# Patient Record
Sex: MALE | Race: WHITE | Employment: OTHER | URBAN - METROPOLITAN AREA
[De-identification: names, ages, dates, MRNs, and addresses within clinical notes are randomized per-mention and may not be internally consistent; named-entity substitution may affect disease eponyms.]

---

## 2017-01-03 ENCOUNTER — HOSPITAL ENCOUNTER (OUTPATIENT)
Dept: WOUND CARE | Age: 77
Discharge: HOME | End: 2017-01-03
Attending: INTERNAL MEDICINE | Admitting: INTERNAL MEDICINE

## 2017-01-03 VITALS
HEART RATE: 56 BPM | DIASTOLIC BLOOD PRESSURE: 79 MMHG | SYSTOLIC BLOOD PRESSURE: 152 MMHG | RESPIRATION RATE: 20 BRPM | WEIGHT: 215.39 LBS | HEIGHT: 69 IN | BODY MASS INDEX: 31.9 KG/M2 | TEMPERATURE: 97.8 F

## 2017-01-03 RX ORDER — LIDOCAINE 50 MG/G
OINTMENT TOPICAL PRN
Status: DISCONTINUED | OUTPATIENT
Start: 2017-01-03 | End: 2017-01-04 | Stop reason: HOSPADM

## 2017-01-03 ASSESSMENT — PAIN DESCRIPTION - ORIENTATION: ORIENTATION: RIGHT

## 2017-01-03 ASSESSMENT — PAIN DESCRIPTION - PAIN TYPE: TYPE: CHRONIC PAIN

## 2017-01-03 ASSESSMENT — PAIN DESCRIPTION - PROGRESSION: CLINICAL_PROGRESSION: NOT CHANGED

## 2017-01-03 ASSESSMENT — PAIN DESCRIPTION - FREQUENCY: FREQUENCY: CONTINUOUS

## 2017-01-03 ASSESSMENT — PAIN DESCRIPTION - LOCATION: LOCATION: HAND

## 2017-01-03 ASSESSMENT — PAIN DESCRIPTION - DESCRIPTORS: DESCRIPTORS: ACHING;STABBING

## 2017-01-03 ASSESSMENT — PAIN DESCRIPTION - ONSET: ONSET: ON-GOING

## 2017-01-03 ASSESSMENT — PAIN SCALES - GENERAL: PAINLEVEL_OUTOF10: 2

## 2017-01-10 ENCOUNTER — HOSPITAL ENCOUNTER (OUTPATIENT)
Dept: WOUND CARE | Age: 77
Discharge: HOME | End: 2017-01-11
Attending: INTERNAL MEDICINE | Admitting: INTERNAL MEDICINE

## 2017-01-10 ENCOUNTER — TELEPHONE (OUTPATIENT)
Dept: INTERNAL MEDICINE CLINIC | Age: 77
End: 2017-01-10

## 2017-01-17 ENCOUNTER — HOSPITAL ENCOUNTER (OUTPATIENT)
Dept: WOUND CARE | Age: 77
Discharge: HOME | End: 2017-01-17
Attending: INTERNAL MEDICINE | Admitting: INTERNAL MEDICINE

## 2017-01-17 VITALS
TEMPERATURE: 98.4 F | SYSTOLIC BLOOD PRESSURE: 155 MMHG | HEART RATE: 57 BPM | RESPIRATION RATE: 20 BRPM | DIASTOLIC BLOOD PRESSURE: 81 MMHG

## 2017-01-17 RX ORDER — DOXYCYCLINE HYCLATE 100 MG
100 TABLET ORAL 2 TIMES DAILY
Qty: 28 TABLET | Refills: 0 | Status: SHIPPED | OUTPATIENT
Start: 2017-01-17 | End: 2017-01-31

## 2017-01-17 RX ORDER — LIDOCAINE 50 MG/G
OINTMENT TOPICAL ONCE
Status: DISCONTINUED | OUTPATIENT
Start: 2017-01-17 | End: 2017-01-18 | Stop reason: HOSPADM

## 2017-01-18 ENCOUNTER — TELEPHONE (OUTPATIENT)
Dept: INTERNAL MEDICINE CLINIC | Age: 77
End: 2017-01-18

## 2017-01-19 ENCOUNTER — INITIAL CONSULT (OUTPATIENT)
Dept: SURGERY | Age: 77
End: 2017-01-19

## 2017-01-19 ENCOUNTER — TELEPHONE (OUTPATIENT)
Dept: INTERNAL MEDICINE CLINIC | Age: 77
End: 2017-01-19

## 2017-01-19 ENCOUNTER — TELEPHONE (OUTPATIENT)
Dept: SURGERY | Age: 77
End: 2017-01-19

## 2017-01-19 VITALS
BODY MASS INDEX: 32.73 KG/M2 | DIASTOLIC BLOOD PRESSURE: 68 MMHG | WEIGHT: 221 LBS | HEIGHT: 69 IN | SYSTOLIC BLOOD PRESSURE: 120 MMHG

## 2017-01-19 DIAGNOSIS — Z72.0 TOBACCO ABUSE: ICD-10-CM

## 2017-01-19 DIAGNOSIS — C43.9 MELANOMA OF SKIN (HCC): Primary | ICD-10-CM

## 2017-01-19 PROCEDURE — G8419 CALC BMI OUT NRM PARAM NOF/U: HCPCS | Performed by: SURGERY

## 2017-01-19 PROCEDURE — 4004F PT TOBACCO SCREEN RCVD TLK: CPT | Performed by: SURGERY

## 2017-01-19 PROCEDURE — G8484 FLU IMMUNIZE NO ADMIN: HCPCS | Performed by: SURGERY

## 2017-01-19 PROCEDURE — 4040F PNEUMOC VAC/ADMIN/RCVD: CPT | Performed by: SURGERY

## 2017-01-19 PROCEDURE — G8427 DOCREV CUR MEDS BY ELIG CLIN: HCPCS | Performed by: SURGERY

## 2017-01-19 PROCEDURE — 99203 OFFICE O/P NEW LOW 30 MIN: CPT | Performed by: SURGERY

## 2017-01-19 PROCEDURE — G8598 ASA/ANTIPLAT THER USED: HCPCS | Performed by: SURGERY

## 2017-01-22 ASSESSMENT — ENCOUNTER SYMPTOMS
RESPIRATORY NEGATIVE: 1
COUGH: 0
COLOR CHANGE: 1
SHORTNESS OF BREATH: 0
GASTROINTESTINAL NEGATIVE: 1

## 2017-01-23 ENCOUNTER — HOSPITAL ENCOUNTER (OUTPATIENT)
Dept: SURGERY | Age: 77
Discharge: HOME | End: 2017-01-23
Attending: SURGERY | Admitting: SURGERY

## 2017-01-23 VITALS
SYSTOLIC BLOOD PRESSURE: 132 MMHG | TEMPERATURE: 97.5 F | BODY MASS INDEX: 32.46 KG/M2 | DIASTOLIC BLOOD PRESSURE: 54 MMHG | WEIGHT: 219.14 LBS | HEART RATE: 49 BPM | HEIGHT: 69 IN | OXYGEN SATURATION: 100 % | RESPIRATION RATE: 20 BRPM

## 2017-01-23 PROCEDURE — 11604 EXC TR-EXT MAL+MARG 3.1-4 CM: CPT | Performed by: SURGERY

## 2017-01-23 RX ORDER — HYDROCODONE BITARTRATE AND ACETAMINOPHEN 5; 325 MG/1; MG/1
1-2 TABLET ORAL EVERY 6 HOURS PRN
Qty: 20 TABLET | Refills: 0 | Status: SHIPPED | OUTPATIENT
Start: 2017-01-23 | End: 2017-03-03

## 2017-01-23 ASSESSMENT — PAIN SCALES - GENERAL: PAINLEVEL_OUTOF10: 0

## 2017-01-23 ASSESSMENT — PAIN - FUNCTIONAL ASSESSMENT: PAIN_FUNCTIONAL_ASSESSMENT: 0-10

## 2017-01-23 ASSESSMENT — PAIN DESCRIPTION - PAIN TYPE: TYPE: SURGICAL PAIN

## 2017-01-25 ENCOUNTER — OFFICE VISIT (OUTPATIENT)
Dept: SURGERY | Age: 77
End: 2017-01-25

## 2017-01-25 ENCOUNTER — TELEPHONE (OUTPATIENT)
Dept: SURGERY | Age: 77
End: 2017-01-25

## 2017-01-25 VITALS
SYSTOLIC BLOOD PRESSURE: 174 MMHG | BODY MASS INDEX: 32.14 KG/M2 | DIASTOLIC BLOOD PRESSURE: 68 MMHG | WEIGHT: 217 LBS | HEIGHT: 69 IN | HEART RATE: 45 BPM

## 2017-01-25 DIAGNOSIS — C43.9 MELANOMA OF SKIN (HCC): Primary | ICD-10-CM

## 2017-01-25 DIAGNOSIS — Z72.0 TOBACCO ABUSE: ICD-10-CM

## 2017-01-25 PROBLEM — K81.1 CHRONIC CHOLECYSTITIS: Status: ACTIVE | Noted: 2017-01-25

## 2017-01-25 PROBLEM — K81.1 CHRONIC CHOLECYSTITIS: Status: RESOLVED | Noted: 2017-01-25 | Resolved: 2017-01-25

## 2017-01-25 PROCEDURE — 99024 POSTOP FOLLOW-UP VISIT: CPT | Performed by: SURGERY

## 2017-02-09 ENCOUNTER — OFFICE VISIT (OUTPATIENT)
Dept: SURGERY | Age: 77
End: 2017-02-09

## 2017-02-09 VITALS
DIASTOLIC BLOOD PRESSURE: 80 MMHG | HEIGHT: 69 IN | WEIGHT: 217 LBS | SYSTOLIC BLOOD PRESSURE: 124 MMHG | BODY MASS INDEX: 32.14 KG/M2

## 2017-02-09 DIAGNOSIS — C43.9 MELANOMA OF SKIN (HCC): Primary | ICD-10-CM

## 2017-02-09 PROCEDURE — 99024 POSTOP FOLLOW-UP VISIT: CPT | Performed by: SURGERY

## 2017-02-15 RX ORDER — METOPROLOL SUCCINATE 50 MG/1
TABLET, EXTENDED RELEASE ORAL
Qty: 30 TABLET | Refills: 5 | Status: SHIPPED | OUTPATIENT
Start: 2017-02-15 | End: 2017-02-15 | Stop reason: SDUPTHER

## 2017-02-16 RX ORDER — METOPROLOL SUCCINATE 50 MG/1
TABLET, EXTENDED RELEASE ORAL
Qty: 30 TABLET | Refills: 5 | Status: SHIPPED | OUTPATIENT
Start: 2017-02-16 | End: 2017-02-16 | Stop reason: SDUPTHER

## 2017-02-16 RX ORDER — METOPROLOL SUCCINATE 50 MG/1
TABLET, EXTENDED RELEASE ORAL
Qty: 60 TABLET | Refills: 5 | Status: SHIPPED | OUTPATIENT
Start: 2017-02-16 | End: 2017-06-15 | Stop reason: SDUPTHER

## 2017-02-20 ENCOUNTER — OFFICE VISIT (OUTPATIENT)
Dept: INTERNAL MEDICINE CLINIC | Age: 77
End: 2017-02-20

## 2017-02-20 VITALS
HEART RATE: 46 BPM | SYSTOLIC BLOOD PRESSURE: 124 MMHG | BODY MASS INDEX: 31.99 KG/M2 | TEMPERATURE: 98.1 F | HEIGHT: 69 IN | OXYGEN SATURATION: 98 % | WEIGHT: 216 LBS | RESPIRATION RATE: 16 BRPM | DIASTOLIC BLOOD PRESSURE: 62 MMHG

## 2017-02-20 DIAGNOSIS — J40 BRONCHITIS: Primary | ICD-10-CM

## 2017-02-20 DIAGNOSIS — I48.0 PAROXYSMAL ATRIAL FIBRILLATION (HCC): ICD-10-CM

## 2017-02-20 DIAGNOSIS — I10 ESSENTIAL HYPERTENSION: Chronic | ICD-10-CM

## 2017-02-20 PROCEDURE — 4040F PNEUMOC VAC/ADMIN/RCVD: CPT | Performed by: INTERNAL MEDICINE

## 2017-02-20 PROCEDURE — G8484 FLU IMMUNIZE NO ADMIN: HCPCS | Performed by: INTERNAL MEDICINE

## 2017-02-20 PROCEDURE — 1123F ACP DISCUSS/DSCN MKR DOCD: CPT | Performed by: INTERNAL MEDICINE

## 2017-02-20 PROCEDURE — G8428 CUR MEDS NOT DOCUMENT: HCPCS | Performed by: INTERNAL MEDICINE

## 2017-02-20 PROCEDURE — G8598 ASA/ANTIPLAT THER USED: HCPCS | Performed by: INTERNAL MEDICINE

## 2017-02-20 PROCEDURE — 99214 OFFICE O/P EST MOD 30 MIN: CPT | Performed by: INTERNAL MEDICINE

## 2017-02-20 PROCEDURE — 4004F PT TOBACCO SCREEN RCVD TLK: CPT | Performed by: INTERNAL MEDICINE

## 2017-02-20 PROCEDURE — G8419 CALC BMI OUT NRM PARAM NOF/U: HCPCS | Performed by: INTERNAL MEDICINE

## 2017-02-20 RX ORDER — CEFUROXIME AXETIL 500 MG/1
500 TABLET ORAL 2 TIMES DAILY
Qty: 20 TABLET | Refills: 0 | Status: SHIPPED | OUTPATIENT
Start: 2017-02-20 | End: 2017-03-02

## 2017-02-20 RX ORDER — PREDNISONE 10 MG/1
TABLET ORAL
Qty: 28 TABLET | Refills: 0 | Status: SHIPPED | OUTPATIENT
Start: 2017-02-20 | End: 2017-03-03

## 2017-02-20 RX ORDER — PROMETHAZINE HYDROCHLORIDE AND CODEINE PHOSPHATE 6.25; 1 MG/5ML; MG/5ML
5 SYRUP ORAL EVERY 4 HOURS PRN
Qty: 180 ML | Refills: 0 | Status: SHIPPED | OUTPATIENT
Start: 2017-02-20 | End: 2017-02-27

## 2017-02-20 ASSESSMENT — ENCOUNTER SYMPTOMS
ABDOMINAL PAIN: 0
SHORTNESS OF BREATH: 0

## 2017-02-21 ENCOUNTER — HOSPITAL ENCOUNTER (OUTPATIENT)
Dept: OTHER | Age: 77
Discharge: HOME | End: 2017-02-21
Attending: INTERNAL MEDICINE | Admitting: INTERNAL MEDICINE

## 2017-02-21 DIAGNOSIS — J40 BRONCHITIS: ICD-10-CM

## 2017-02-22 ASSESSMENT — ENCOUNTER SYMPTOMS
ABDOMINAL PAIN: 0
SHORTNESS OF BREATH: 0

## 2017-02-28 ENCOUNTER — OFFICE VISIT (OUTPATIENT)
Dept: INTERNAL MEDICINE CLINIC | Age: 77
End: 2017-02-28

## 2017-02-28 VITALS
OXYGEN SATURATION: 94 % | HEART RATE: 77 BPM | RESPIRATION RATE: 16 BRPM | BODY MASS INDEX: 31.7 KG/M2 | WEIGHT: 214 LBS | SYSTOLIC BLOOD PRESSURE: 130 MMHG | TEMPERATURE: 97.9 F | HEIGHT: 69 IN | DIASTOLIC BLOOD PRESSURE: 62 MMHG

## 2017-02-28 DIAGNOSIS — I25.709 ATHEROSCLEROSIS OF CORONARY ARTERY BYPASS GRAFT OF NATIVE HEART WITH ANGINA PECTORIS (HCC): ICD-10-CM

## 2017-02-28 DIAGNOSIS — G40.909 UNSPECIFIED EPILEPSY WITHOUT MENTION OF INTRACTABLE EPILEPSY: ICD-10-CM

## 2017-02-28 DIAGNOSIS — R53.83 OTHER FATIGUE: ICD-10-CM

## 2017-02-28 DIAGNOSIS — N40.0 BENIGN NON-NODULAR PROSTATIC HYPERPLASIA WITHOUT LOWER URINARY TRACT SYMPTOMS: ICD-10-CM

## 2017-02-28 DIAGNOSIS — I10 ESSENTIAL HYPERTENSION: Chronic | ICD-10-CM

## 2017-02-28 DIAGNOSIS — E78.00 PURE HYPERCHOLESTEROLEMIA: Chronic | ICD-10-CM

## 2017-02-28 DIAGNOSIS — J40 BRONCHITIS: Primary | ICD-10-CM

## 2017-02-28 DIAGNOSIS — I50.31 ACUTE DIASTOLIC HEART FAILURE (HCC): ICD-10-CM

## 2017-02-28 PROCEDURE — 1123F ACP DISCUSS/DSCN MKR DOCD: CPT | Performed by: INTERNAL MEDICINE

## 2017-02-28 PROCEDURE — 4004F PT TOBACCO SCREEN RCVD TLK: CPT | Performed by: INTERNAL MEDICINE

## 2017-02-28 PROCEDURE — G8428 CUR MEDS NOT DOCUMENT: HCPCS | Performed by: INTERNAL MEDICINE

## 2017-02-28 PROCEDURE — G8417 CALC BMI ABV UP PARAM F/U: HCPCS | Performed by: INTERNAL MEDICINE

## 2017-02-28 PROCEDURE — 99214 OFFICE O/P EST MOD 30 MIN: CPT | Performed by: INTERNAL MEDICINE

## 2017-02-28 PROCEDURE — G8598 ASA/ANTIPLAT THER USED: HCPCS | Performed by: INTERNAL MEDICINE

## 2017-02-28 PROCEDURE — 4040F PNEUMOC VAC/ADMIN/RCVD: CPT | Performed by: INTERNAL MEDICINE

## 2017-02-28 PROCEDURE — G8484 FLU IMMUNIZE NO ADMIN: HCPCS | Performed by: INTERNAL MEDICINE

## 2017-03-03 ENCOUNTER — OFFICE VISIT (OUTPATIENT)
Dept: NEUROLOGY | Age: 77
End: 2017-03-03

## 2017-03-03 ENCOUNTER — TELEPHONE (OUTPATIENT)
Dept: INTERNAL MEDICINE CLINIC | Age: 77
End: 2017-03-03

## 2017-03-03 VITALS
SYSTOLIC BLOOD PRESSURE: 124 MMHG | WEIGHT: 215 LBS | BODY MASS INDEX: 30.78 KG/M2 | DIASTOLIC BLOOD PRESSURE: 62 MMHG | HEIGHT: 70 IN | HEART RATE: 60 BPM

## 2017-03-03 DIAGNOSIS — G40.209 PARTIAL SYMPTOMATIC EPILEPSY WITH COMPLEX PARTIAL SEIZURES, NOT INTRACTABLE, WITHOUT STATUS EPILEPTICUS (HCC): ICD-10-CM

## 2017-03-03 DIAGNOSIS — R41.82 ALTERED MENTAL STATUS, UNSPECIFIED ALTERED MENTAL STATUS TYPE: Primary | ICD-10-CM

## 2017-03-03 DIAGNOSIS — G44.86 CERVICOGENIC HEADACHE: ICD-10-CM

## 2017-03-03 DIAGNOSIS — M47.812 SPONDYLOSIS OF CERVICAL REGION WITHOUT MYELOPATHY OR RADICULOPATHY: ICD-10-CM

## 2017-03-03 PROCEDURE — G8484 FLU IMMUNIZE NO ADMIN: HCPCS | Performed by: PSYCHIATRY & NEUROLOGY

## 2017-03-03 PROCEDURE — G8427 DOCREV CUR MEDS BY ELIG CLIN: HCPCS | Performed by: PSYCHIATRY & NEUROLOGY

## 2017-03-03 PROCEDURE — 1123F ACP DISCUSS/DSCN MKR DOCD: CPT | Performed by: PSYCHIATRY & NEUROLOGY

## 2017-03-03 PROCEDURE — 4040F PNEUMOC VAC/ADMIN/RCVD: CPT | Performed by: PSYCHIATRY & NEUROLOGY

## 2017-03-03 PROCEDURE — G8417 CALC BMI ABV UP PARAM F/U: HCPCS | Performed by: PSYCHIATRY & NEUROLOGY

## 2017-03-03 PROCEDURE — 4004F PT TOBACCO SCREEN RCVD TLK: CPT | Performed by: PSYCHIATRY & NEUROLOGY

## 2017-03-03 PROCEDURE — G8598 ASA/ANTIPLAT THER USED: HCPCS | Performed by: PSYCHIATRY & NEUROLOGY

## 2017-03-03 PROCEDURE — 99214 OFFICE O/P EST MOD 30 MIN: CPT | Performed by: PSYCHIATRY & NEUROLOGY

## 2017-03-03 RX ORDER — FLUTICASONE PROPIONATE 50 MCG
2 SPRAY, SUSPENSION (ML) NASAL 2 TIMES DAILY
Qty: 1 BOTTLE | Refills: 3 | Status: SHIPPED | OUTPATIENT
Start: 2017-03-03 | End: 2017-04-18 | Stop reason: ALTCHOICE

## 2017-03-03 RX ORDER — LORATADINE 10 MG/1
10 TABLET ORAL DAILY
Qty: 30 TABLET | Refills: 3 | Status: SHIPPED | OUTPATIENT
Start: 2017-03-03

## 2017-03-03 RX ORDER — LEVETIRACETAM 500 MG/1
TABLET ORAL
Qty: 90 TABLET | Refills: 1 | Status: SHIPPED | OUTPATIENT
Start: 2017-03-03 | End: 2017-04-13 | Stop reason: SDUPTHER

## 2017-03-10 DIAGNOSIS — R53.81 MALAISE AND FATIGUE: ICD-10-CM

## 2017-03-10 DIAGNOSIS — E78.00 PURE HYPERCHOLESTEROLEMIA: Primary | ICD-10-CM

## 2017-03-10 DIAGNOSIS — R53.83 MALAISE AND FATIGUE: ICD-10-CM

## 2017-03-10 DIAGNOSIS — N40.0 BENIGN NON-NODULAR PROSTATIC HYPERPLASIA WITHOUT LOWER URINARY TRACT SYMPTOMS: ICD-10-CM

## 2017-03-10 LAB
A/G RATIO: 1.7 (ref 1.1–2.2)
ALBUMIN SERPL-MCNC: 4 G/DL (ref 3.4–5)
ALP BLD-CCNC: 82 U/L (ref 40–129)
ALT SERPL-CCNC: 20 U/L (ref 10–40)
ANION GAP SERPL CALCULATED.3IONS-SCNC: 15 MMOL/L (ref 3–16)
AST SERPL-CCNC: 21 U/L (ref 15–37)
BASOPHILS ABSOLUTE: 0 K/UL (ref 0–0.2)
BASOPHILS RELATIVE PERCENT: 0.4 %
BILIRUB SERPL-MCNC: 0.3 MG/DL (ref 0–1)
BUN BLDV-MCNC: 9 MG/DL (ref 7–20)
CALCIUM SERPL-MCNC: 8.9 MG/DL (ref 8.3–10.6)
CHLORIDE BLD-SCNC: 88 MMOL/L (ref 99–110)
CHOLESTEROL, TOTAL: 196 MG/DL (ref 0–199)
CO2: 24 MMOL/L (ref 21–32)
CREAT SERPL-MCNC: 0.8 MG/DL (ref 0.8–1.3)
EOSINOPHILS ABSOLUTE: 0 K/UL (ref 0–0.6)
EOSINOPHILS RELATIVE PERCENT: 1 %
GFR AFRICAN AMERICAN: >60
GFR NON-AFRICAN AMERICAN: >60
GLOBULIN: 2.3 G/DL
GLUCOSE BLD-MCNC: 93 MG/DL (ref 70–99)
HCT VFR BLD CALC: 41.3 % (ref 40.5–52.5)
HDLC SERPL-MCNC: 41 MG/DL (ref 40–60)
HEMOGLOBIN: 13.5 G/DL (ref 13.5–17.5)
LDL CHOLESTEROL CALCULATED: 113 MG/DL
LYMPHOCYTES ABSOLUTE: 1 K/UL (ref 1–5.1)
LYMPHOCYTES RELATIVE PERCENT: 20.5 %
MCH RBC QN AUTO: 28.8 PG (ref 26–34)
MCHC RBC AUTO-ENTMCNC: 32.6 G/DL (ref 31–36)
MCV RBC AUTO: 88.2 FL (ref 80–100)
MONOCYTES ABSOLUTE: 0.5 K/UL (ref 0–1.3)
MONOCYTES RELATIVE PERCENT: 10 %
NEUTROPHILS ABSOLUTE: 3.5 K/UL (ref 1.7–7.7)
NEUTROPHILS RELATIVE PERCENT: 68.1 %
PDW BLD-RTO: 14.5 % (ref 12.4–15.4)
PLATELET # BLD: 208 K/UL (ref 135–450)
PMV BLD AUTO: 7.6 FL (ref 5–10.5)
POTASSIUM SERPL-SCNC: 4.9 MMOL/L (ref 3.5–5.1)
PROSTATE SPECIFIC ANTIGEN: 1.47 NG/ML (ref 0–4)
RBC # BLD: 4.68 M/UL (ref 4.2–5.9)
SODIUM BLD-SCNC: 127 MMOL/L (ref 136–145)
TOTAL PROTEIN: 6.3 G/DL (ref 6.4–8.2)
TRIGL SERPL-MCNC: 208 MG/DL (ref 0–150)
TSH SERPL DL<=0.05 MIU/L-ACNC: 2.65 UIU/ML (ref 0.27–4.2)
VLDLC SERPL CALC-MCNC: 42 MG/DL
WBC # BLD: 5.1 K/UL (ref 4–11)

## 2017-03-13 RX ORDER — TORSEMIDE 20 MG/1
20 TABLET ORAL DAILY
Qty: 30 TABLET | Refills: 1 | Status: SHIPPED | OUTPATIENT
Start: 2017-03-13 | End: 2017-03-29 | Stop reason: SDUPTHER

## 2017-03-23 RX ORDER — ESCITALOPRAM OXALATE 10 MG/1
TABLET ORAL
Qty: 30 TABLET | Refills: 5 | Status: SHIPPED | OUTPATIENT
Start: 2017-03-23 | End: 2017-10-18 | Stop reason: SDUPTHER

## 2017-03-24 ASSESSMENT — ENCOUNTER SYMPTOMS
ABDOMINAL PAIN: 0
SHORTNESS OF BREATH: 0

## 2017-03-28 DIAGNOSIS — E87.1 LOW SODIUM LEVELS: Primary | ICD-10-CM

## 2017-03-28 LAB
ANION GAP SERPL CALCULATED.3IONS-SCNC: 15 MMOL/L (ref 3–16)
BUN BLDV-MCNC: 14 MG/DL (ref 7–20)
CALCIUM SERPL-MCNC: 9.2 MG/DL (ref 8.3–10.6)
CHLORIDE BLD-SCNC: 94 MMOL/L (ref 99–110)
CO2: 28 MMOL/L (ref 21–32)
CREAT SERPL-MCNC: 0.9 MG/DL (ref 0.8–1.3)
GFR AFRICAN AMERICAN: >60
GFR NON-AFRICAN AMERICAN: >60
GLUCOSE BLD-MCNC: 106 MG/DL (ref 70–99)
POTASSIUM SERPL-SCNC: 4.6 MMOL/L (ref 3.5–5.1)
SODIUM BLD-SCNC: 137 MMOL/L (ref 136–145)

## 2017-03-29 ENCOUNTER — OFFICE VISIT (OUTPATIENT)
Dept: INTERNAL MEDICINE CLINIC | Age: 77
End: 2017-03-29

## 2017-03-29 VITALS
RESPIRATION RATE: 16 BRPM | WEIGHT: 214 LBS | SYSTOLIC BLOOD PRESSURE: 132 MMHG | HEART RATE: 72 BPM | DIASTOLIC BLOOD PRESSURE: 60 MMHG | BODY MASS INDEX: 31.7 KG/M2 | HEIGHT: 69 IN

## 2017-03-29 DIAGNOSIS — I10 ESSENTIAL HYPERTENSION: Chronic | ICD-10-CM

## 2017-03-29 DIAGNOSIS — E87.1 HYPONATREMIA: Primary | ICD-10-CM

## 2017-03-29 DIAGNOSIS — E78.00 PURE HYPERCHOLESTEROLEMIA: Chronic | ICD-10-CM

## 2017-03-29 PROCEDURE — 4004F PT TOBACCO SCREEN RCVD TLK: CPT | Performed by: INTERNAL MEDICINE

## 2017-03-29 PROCEDURE — 1123F ACP DISCUSS/DSCN MKR DOCD: CPT | Performed by: INTERNAL MEDICINE

## 2017-03-29 PROCEDURE — G8484 FLU IMMUNIZE NO ADMIN: HCPCS | Performed by: INTERNAL MEDICINE

## 2017-03-29 PROCEDURE — G8417 CALC BMI ABV UP PARAM F/U: HCPCS | Performed by: INTERNAL MEDICINE

## 2017-03-29 PROCEDURE — 99214 OFFICE O/P EST MOD 30 MIN: CPT | Performed by: INTERNAL MEDICINE

## 2017-03-29 PROCEDURE — G8428 CUR MEDS NOT DOCUMENT: HCPCS | Performed by: INTERNAL MEDICINE

## 2017-03-29 PROCEDURE — G8598 ASA/ANTIPLAT THER USED: HCPCS | Performed by: INTERNAL MEDICINE

## 2017-03-29 PROCEDURE — 4040F PNEUMOC VAC/ADMIN/RCVD: CPT | Performed by: INTERNAL MEDICINE

## 2017-03-29 RX ORDER — TORSEMIDE 20 MG/1
20 TABLET ORAL DAILY
Qty: 90 TABLET | Refills: 3 | Status: SHIPPED | OUTPATIENT
Start: 2017-03-29 | End: 2017-04-27

## 2017-04-11 ENCOUNTER — HOSPITAL ENCOUNTER (OUTPATIENT)
Dept: WOUND CARE | Age: 77
Discharge: HOME | End: 2017-04-11
Attending: INTERNAL MEDICINE | Admitting: INTERNAL MEDICINE

## 2017-04-11 ENCOUNTER — HOSPITAL ENCOUNTER (OUTPATIENT)
Dept: OTHER | Age: 77
Discharge: HOME | End: 2017-04-11
Attending: INTERNAL MEDICINE | Admitting: INTERNAL MEDICINE

## 2017-04-11 VITALS
HEART RATE: 57 BPM | WEIGHT: 215.17 LBS | TEMPERATURE: 98.1 F | BODY MASS INDEX: 31.77 KG/M2 | RESPIRATION RATE: 17 BRPM | DIASTOLIC BLOOD PRESSURE: 81 MMHG | SYSTOLIC BLOOD PRESSURE: 136 MMHG

## 2017-04-11 DIAGNOSIS — S59.919A INJURY, OTHER AND UNSPECIFIED, ELBOW, FOREARM, AND WRIST: ICD-10-CM

## 2017-04-11 DIAGNOSIS — S59.909A INJURY, OTHER AND UNSPECIFIED, ELBOW, FOREARM, AND WRIST: ICD-10-CM

## 2017-04-11 DIAGNOSIS — S69.90XA INJURY, OTHER AND UNSPECIFIED, ELBOW, FOREARM, AND WRIST: ICD-10-CM

## 2017-04-11 DIAGNOSIS — S69.90XS: Primary | ICD-10-CM

## 2017-04-11 LAB
C-REACTIVE PROTEIN: 3.9 MG/L (ref 0–5.1)
SEDIMENTATION RATE, ERYTHROCYTE: 14 MM/HR (ref 0–20)

## 2017-04-13 ENCOUNTER — OFFICE VISIT (OUTPATIENT)
Age: 77
End: 2017-04-13

## 2017-04-13 VITALS
HEIGHT: 70 IN | BODY MASS INDEX: 30.92 KG/M2 | OXYGEN SATURATION: 98 % | SYSTOLIC BLOOD PRESSURE: 122 MMHG | WEIGHT: 216 LBS | DIASTOLIC BLOOD PRESSURE: 64 MMHG | HEART RATE: 56 BPM

## 2017-04-13 DIAGNOSIS — I50.33 ACUTE ON CHRONIC DIASTOLIC CHF (CONGESTIVE HEART FAILURE), NYHA CLASS 3 (HCC): Primary | ICD-10-CM

## 2017-04-13 DIAGNOSIS — Z95.1 HX OF CABG: ICD-10-CM

## 2017-04-13 DIAGNOSIS — R06.09 DOE (DYSPNEA ON EXERTION): ICD-10-CM

## 2017-04-13 DIAGNOSIS — I10 ESSENTIAL HYPERTENSION: Chronic | ICD-10-CM

## 2017-04-13 DIAGNOSIS — I25.10 CORONARY ARTERY DISEASE INVOLVING NATIVE CORONARY ARTERY OF NATIVE HEART WITHOUT ANGINA PECTORIS: Chronic | ICD-10-CM

## 2017-04-13 DIAGNOSIS — R63.5 WEIGHT GAIN: ICD-10-CM

## 2017-04-13 DIAGNOSIS — R06.00 PND (PAROXYSMAL NOCTURNAL DYSPNEA): ICD-10-CM

## 2017-04-13 DIAGNOSIS — I48.91 ATRIAL FIBRILLATION, UNSPECIFIED TYPE (HCC): ICD-10-CM

## 2017-04-13 PROCEDURE — G8417 CALC BMI ABV UP PARAM F/U: HCPCS | Performed by: INTERNAL MEDICINE

## 2017-04-13 PROCEDURE — 4040F PNEUMOC VAC/ADMIN/RCVD: CPT | Performed by: INTERNAL MEDICINE

## 2017-04-13 PROCEDURE — 99214 OFFICE O/P EST MOD 30 MIN: CPT | Performed by: INTERNAL MEDICINE

## 2017-04-13 PROCEDURE — 93000 ELECTROCARDIOGRAM COMPLETE: CPT | Performed by: INTERNAL MEDICINE

## 2017-04-13 PROCEDURE — G8427 DOCREV CUR MEDS BY ELIG CLIN: HCPCS | Performed by: INTERNAL MEDICINE

## 2017-04-13 PROCEDURE — 1123F ACP DISCUSS/DSCN MKR DOCD: CPT | Performed by: INTERNAL MEDICINE

## 2017-04-13 PROCEDURE — G8598 ASA/ANTIPLAT THER USED: HCPCS | Performed by: INTERNAL MEDICINE

## 2017-04-13 PROCEDURE — 4004F PT TOBACCO SCREEN RCVD TLK: CPT | Performed by: INTERNAL MEDICINE

## 2017-04-13 RX ORDER — AMIODARONE HYDROCHLORIDE 200 MG/1
100 TABLET ORAL DAILY
Qty: 30 TABLET | Refills: 6 | Status: SHIPPED | OUTPATIENT
Start: 2017-04-13 | End: 2017-10-05 | Stop reason: ALTCHOICE

## 2017-04-17 ENCOUNTER — TELEPHONE (OUTPATIENT)
Dept: INTERNAL MEDICINE CLINIC | Age: 77
End: 2017-04-17

## 2017-04-18 ENCOUNTER — HOSPITAL ENCOUNTER (OUTPATIENT)
Dept: WOUND CARE | Age: 77
Discharge: HOME | End: 2017-04-18
Attending: INTERNAL MEDICINE | Admitting: INTERNAL MEDICINE

## 2017-04-18 VITALS
RESPIRATION RATE: 18 BRPM | HEART RATE: 52 BPM | TEMPERATURE: 98 F | DIASTOLIC BLOOD PRESSURE: 66 MMHG | SYSTOLIC BLOOD PRESSURE: 123 MMHG

## 2017-04-18 ASSESSMENT — PAIN SCALES - GENERAL: PAINLEVEL_OUTOF10: 0

## 2017-04-27 RX ORDER — TORSEMIDE 20 MG/1
TABLET ORAL
Qty: 30 TABLET | Refills: 5 | Status: SHIPPED | OUTPATIENT
Start: 2017-04-27 | End: 2017-08-10 | Stop reason: SDUPTHER

## 2017-05-01 ENCOUNTER — TELEPHONE (OUTPATIENT)
Dept: CARDIOLOGY CLINIC | Age: 77
End: 2017-05-01

## 2017-05-04 DIAGNOSIS — I25.10 CORONARY ARTERY DISEASE INVOLVING NATIVE CORONARY ARTERY OF NATIVE HEART WITHOUT ANGINA PECTORIS: Chronic | ICD-10-CM

## 2017-05-04 DIAGNOSIS — R06.00 PND (PAROXYSMAL NOCTURNAL DYSPNEA): ICD-10-CM

## 2017-05-04 DIAGNOSIS — I10 ESSENTIAL HYPERTENSION: Chronic | ICD-10-CM

## 2017-05-04 DIAGNOSIS — R63.5 WEIGHT GAIN: ICD-10-CM

## 2017-05-04 DIAGNOSIS — R06.09 DOE (DYSPNEA ON EXERTION): ICD-10-CM

## 2017-05-04 LAB
ANION GAP SERPL CALCULATED.3IONS-SCNC: 18 MMOL/L (ref 3–16)
BUN BLDV-MCNC: 20 MG/DL (ref 7–20)
CALCIUM SERPL-MCNC: 9.4 MG/DL (ref 8.3–10.6)
CHLORIDE BLD-SCNC: 92 MMOL/L (ref 99–110)
CO2: 26 MMOL/L (ref 21–32)
CREAT SERPL-MCNC: 1.2 MG/DL (ref 0.8–1.3)
GFR AFRICAN AMERICAN: >60
GFR NON-AFRICAN AMERICAN: 58
GLUCOSE BLD-MCNC: 85 MG/DL (ref 70–99)
POTASSIUM SERPL-SCNC: 5.1 MMOL/L (ref 3.5–5.1)
PRO-BNP: 289 PG/ML (ref 0–449)
SODIUM BLD-SCNC: 136 MMOL/L (ref 136–145)

## 2017-06-15 ENCOUNTER — OFFICE VISIT (OUTPATIENT)
Age: 77
End: 2017-06-15

## 2017-06-15 VITALS
BODY MASS INDEX: 31.12 KG/M2 | WEIGHT: 217.4 LBS | OXYGEN SATURATION: 96 % | SYSTOLIC BLOOD PRESSURE: 120 MMHG | HEART RATE: 45 BPM | HEIGHT: 70 IN | DIASTOLIC BLOOD PRESSURE: 50 MMHG

## 2017-06-15 DIAGNOSIS — Z79.899 ON AMIODARONE THERAPY: ICD-10-CM

## 2017-06-15 DIAGNOSIS — I50.32 CHRONIC DIASTOLIC CHF (CONGESTIVE HEART FAILURE), NYHA CLASS 2 (HCC): ICD-10-CM

## 2017-06-15 DIAGNOSIS — M79.605 LEG PAIN, BILATERAL: ICD-10-CM

## 2017-06-15 DIAGNOSIS — I48.91 ATRIAL FIBRILLATION, UNSPECIFIED TYPE (HCC): ICD-10-CM

## 2017-06-15 DIAGNOSIS — M79.604 LEG PAIN, BILATERAL: ICD-10-CM

## 2017-06-15 DIAGNOSIS — I25.10 CORONARY ARTERY DISEASE INVOLVING NATIVE CORONARY ARTERY OF NATIVE HEART WITHOUT ANGINA PECTORIS: Primary | Chronic | ICD-10-CM

## 2017-06-15 DIAGNOSIS — R63.5 WEIGHT GAIN: ICD-10-CM

## 2017-06-15 DIAGNOSIS — I10 ESSENTIAL HYPERTENSION: Chronic | ICD-10-CM

## 2017-06-15 DIAGNOSIS — I73.9 CLAUDICATION (HCC): ICD-10-CM

## 2017-06-15 DIAGNOSIS — E78.2 MIXED HYPERLIPIDEMIA: Chronic | ICD-10-CM

## 2017-06-15 DIAGNOSIS — I48.0 PAROXYSMAL A-FIB (HCC): ICD-10-CM

## 2017-06-15 DIAGNOSIS — R06.09 DYSPNEA ON EXERTION: ICD-10-CM

## 2017-06-15 PROCEDURE — G8417 CALC BMI ABV UP PARAM F/U: HCPCS | Performed by: INTERNAL MEDICINE

## 2017-06-15 PROCEDURE — G8598 ASA/ANTIPLAT THER USED: HCPCS | Performed by: INTERNAL MEDICINE

## 2017-06-15 PROCEDURE — G8427 DOCREV CUR MEDS BY ELIG CLIN: HCPCS | Performed by: INTERNAL MEDICINE

## 2017-06-15 PROCEDURE — 1123F ACP DISCUSS/DSCN MKR DOCD: CPT | Performed by: INTERNAL MEDICINE

## 2017-06-15 PROCEDURE — 99214 OFFICE O/P EST MOD 30 MIN: CPT | Performed by: INTERNAL MEDICINE

## 2017-06-15 PROCEDURE — 4004F PT TOBACCO SCREEN RCVD TLK: CPT | Performed by: INTERNAL MEDICINE

## 2017-06-15 PROCEDURE — 4040F PNEUMOC VAC/ADMIN/RCVD: CPT | Performed by: INTERNAL MEDICINE

## 2017-06-15 PROCEDURE — 93000 ELECTROCARDIOGRAM COMPLETE: CPT | Performed by: INTERNAL MEDICINE

## 2017-06-15 RX ORDER — LISINOPRIL 5 MG/1
5 TABLET ORAL DAILY
Qty: 90 TABLET | Refills: 3 | Status: SHIPPED | OUTPATIENT
Start: 2017-06-15 | End: 2018-04-03 | Stop reason: SDUPTHER

## 2017-06-15 RX ORDER — METOPROLOL SUCCINATE 50 MG/1
50 TABLET, EXTENDED RELEASE ORAL DAILY
Qty: 30 TABLET | Refills: 6 | Status: SHIPPED | OUTPATIENT
Start: 2017-06-15 | End: 2017-06-15 | Stop reason: SDUPTHER

## 2017-06-15 RX ORDER — METOPROLOL SUCCINATE 50 MG/1
50 TABLET, EXTENDED RELEASE ORAL NIGHTLY
Qty: 30 TABLET | Refills: 6
Start: 2017-06-15 | End: 2017-10-05 | Stop reason: SDUPTHER

## 2017-06-23 DIAGNOSIS — I48.91 ATRIAL FIBRILLATION STATUS POST CARDIOVERSION (HCC): ICD-10-CM

## 2017-06-23 DIAGNOSIS — R06.09 DYSPNEA ON EXERTION: ICD-10-CM

## 2017-06-23 DIAGNOSIS — E78.00 PURE HYPERCHOLESTEROLEMIA: Chronic | ICD-10-CM

## 2017-06-23 DIAGNOSIS — I10 ESSENTIAL HYPERTENSION: Chronic | ICD-10-CM

## 2017-06-23 LAB
ANION GAP SERPL CALCULATED.3IONS-SCNC: 15 MMOL/L (ref 3–16)
BUN BLDV-MCNC: 20 MG/DL (ref 7–20)
CALCIUM SERPL-MCNC: 9.2 MG/DL (ref 8.3–10.6)
CHLORIDE BLD-SCNC: 92 MMOL/L (ref 99–110)
CO2: 27 MMOL/L (ref 21–32)
CREAT SERPL-MCNC: 0.9 MG/DL (ref 0.8–1.3)
GFR AFRICAN AMERICAN: >60
GFR NON-AFRICAN AMERICAN: >60
GLUCOSE BLD-MCNC: 93 MG/DL (ref 70–99)
POTASSIUM SERPL-SCNC: 4.7 MMOL/L (ref 3.5–5.1)
PRO-BNP: 332 PG/ML (ref 0–449)
SODIUM BLD-SCNC: 134 MMOL/L (ref 136–145)
TSH REFLEX: 3.69 UIU/ML (ref 0.27–4.2)

## 2017-06-28 ENCOUNTER — HOSPITAL ENCOUNTER (OUTPATIENT)
Dept: VASCULAR LAB | Age: 77
Discharge: HOME | End: 2017-06-28
Attending: INTERNAL MEDICINE | Admitting: INTERNAL MEDICINE

## 2017-06-28 DIAGNOSIS — M79.604 PAIN OF RIGHT LEG: ICD-10-CM

## 2017-07-17 ENCOUNTER — OFFICE VISIT (OUTPATIENT)
Dept: INTERNAL MEDICINE CLINIC | Age: 77
End: 2017-07-17

## 2017-07-17 VITALS
SYSTOLIC BLOOD PRESSURE: 120 MMHG | WEIGHT: 213 LBS | HEART RATE: 80 BPM | HEIGHT: 69 IN | BODY MASS INDEX: 31.55 KG/M2 | RESPIRATION RATE: 16 BRPM | DIASTOLIC BLOOD PRESSURE: 50 MMHG

## 2017-07-17 DIAGNOSIS — I48.92 ATRIAL FLUTTER, UNSPECIFIED TYPE (HCC): ICD-10-CM

## 2017-07-17 DIAGNOSIS — M70.21 OLECRANON BURSITIS OF RIGHT ELBOW: ICD-10-CM

## 2017-07-17 DIAGNOSIS — E74.39 GLUCOSE INTOLERANCE (MALABSORPTION): Primary | ICD-10-CM

## 2017-07-17 PROBLEM — M70.22 OLECRANON BURSITIS OF LEFT ELBOW: Status: ACTIVE | Noted: 2017-07-17

## 2017-07-17 PROCEDURE — 1123F ACP DISCUSS/DSCN MKR DOCD: CPT | Performed by: INTERNAL MEDICINE

## 2017-07-17 PROCEDURE — G8417 CALC BMI ABV UP PARAM F/U: HCPCS | Performed by: INTERNAL MEDICINE

## 2017-07-17 PROCEDURE — 4004F PT TOBACCO SCREEN RCVD TLK: CPT | Performed by: INTERNAL MEDICINE

## 2017-07-17 PROCEDURE — 4040F PNEUMOC VAC/ADMIN/RCVD: CPT | Performed by: INTERNAL MEDICINE

## 2017-07-17 PROCEDURE — 20605 DRAIN/INJ JOINT/BURSA W/O US: CPT | Performed by: INTERNAL MEDICINE

## 2017-07-17 PROCEDURE — 99214 OFFICE O/P EST MOD 30 MIN: CPT | Performed by: INTERNAL MEDICINE

## 2017-07-17 PROCEDURE — G8598 ASA/ANTIPLAT THER USED: HCPCS | Performed by: INTERNAL MEDICINE

## 2017-07-17 PROCEDURE — G8428 CUR MEDS NOT DOCUMENT: HCPCS | Performed by: INTERNAL MEDICINE

## 2017-07-17 ASSESSMENT — ENCOUNTER SYMPTOMS
ABDOMINAL PAIN: 0
SHORTNESS OF BREATH: 0

## 2017-07-26 ENCOUNTER — TELEPHONE (OUTPATIENT)
Dept: INTERNAL MEDICINE CLINIC | Age: 77
End: 2017-07-26

## 2017-07-27 PROBLEM — R21 RASH: Status: ACTIVE | Noted: 2017-07-27

## 2017-07-27 ASSESSMENT — ENCOUNTER SYMPTOMS
ABDOMINAL PAIN: 0
SHORTNESS OF BREATH: 0

## 2017-07-28 ENCOUNTER — OFFICE VISIT (OUTPATIENT)
Dept: INTERNAL MEDICINE CLINIC | Age: 77
End: 2017-07-28

## 2017-07-28 VITALS
WEIGHT: 211 LBS | BODY MASS INDEX: 31.25 KG/M2 | SYSTOLIC BLOOD PRESSURE: 132 MMHG | HEIGHT: 69 IN | HEART RATE: 72 BPM | RESPIRATION RATE: 16 BRPM | DIASTOLIC BLOOD PRESSURE: 74 MMHG

## 2017-07-28 DIAGNOSIS — R21 RASH: ICD-10-CM

## 2017-07-28 DIAGNOSIS — I10 ESSENTIAL HYPERTENSION: Chronic | ICD-10-CM

## 2017-07-28 DIAGNOSIS — E78.00 PURE HYPERCHOLESTEROLEMIA: Primary | ICD-10-CM

## 2017-07-28 LAB
A/G RATIO: 2.1 (ref 1.1–2.2)
ALBUMIN SERPL-MCNC: 4.6 G/DL (ref 3.4–5)
ALP BLD-CCNC: 85 U/L (ref 40–129)
ALT SERPL-CCNC: 14 U/L (ref 10–40)
ANION GAP SERPL CALCULATED.3IONS-SCNC: 16 MMOL/L (ref 3–16)
AST SERPL-CCNC: 17 U/L (ref 15–37)
BASOPHILS ABSOLUTE: 0 K/UL (ref 0–0.2)
BASOPHILS RELATIVE PERCENT: 0.3 %
BILIRUB SERPL-MCNC: 0.4 MG/DL (ref 0–1)
BUN BLDV-MCNC: 16 MG/DL (ref 7–20)
CALCIUM SERPL-MCNC: 9.4 MG/DL (ref 8.3–10.6)
CHLORIDE BLD-SCNC: 93 MMOL/L (ref 99–110)
CO2: 26 MMOL/L (ref 21–32)
CREAT SERPL-MCNC: 1 MG/DL (ref 0.8–1.3)
EOSINOPHILS ABSOLUTE: 0 K/UL (ref 0–0.6)
EOSINOPHILS RELATIVE PERCENT: 0.6 %
GFR AFRICAN AMERICAN: >60
GFR NON-AFRICAN AMERICAN: >60
GLOBULIN: 2.2 G/DL
GLUCOSE BLD-MCNC: 107 MG/DL (ref 70–99)
HCT VFR BLD CALC: 41 % (ref 40.5–52.5)
HEMOGLOBIN: 13.4 G/DL (ref 13.5–17.5)
LYMPHOCYTES ABSOLUTE: 1.2 K/UL (ref 1–5.1)
LYMPHOCYTES RELATIVE PERCENT: 21.8 %
MCH RBC QN AUTO: 28.4 PG (ref 26–34)
MCHC RBC AUTO-ENTMCNC: 32.7 G/DL (ref 31–36)
MCV RBC AUTO: 86.7 FL (ref 80–100)
MONOCYTES ABSOLUTE: 0.5 K/UL (ref 0–1.3)
MONOCYTES RELATIVE PERCENT: 9.3 %
NEUTROPHILS ABSOLUTE: 3.9 K/UL (ref 1.7–7.7)
NEUTROPHILS RELATIVE PERCENT: 68 %
PDW BLD-RTO: 14.7 % (ref 12.4–15.4)
PLATELET # BLD: 203 K/UL (ref 135–450)
PMV BLD AUTO: 8.3 FL (ref 5–10.5)
POTASSIUM SERPL-SCNC: 5.1 MMOL/L (ref 3.5–5.1)
RBC # BLD: 4.73 M/UL (ref 4.2–5.9)
SODIUM BLD-SCNC: 135 MMOL/L (ref 136–145)
TOTAL PROTEIN: 6.8 G/DL (ref 6.4–8.2)
WBC # BLD: 5.7 K/UL (ref 4–11)

## 2017-07-28 PROCEDURE — G8428 CUR MEDS NOT DOCUMENT: HCPCS | Performed by: INTERNAL MEDICINE

## 2017-07-28 PROCEDURE — G8598 ASA/ANTIPLAT THER USED: HCPCS | Performed by: INTERNAL MEDICINE

## 2017-07-28 PROCEDURE — 4040F PNEUMOC VAC/ADMIN/RCVD: CPT | Performed by: INTERNAL MEDICINE

## 2017-07-28 PROCEDURE — 1123F ACP DISCUSS/DSCN MKR DOCD: CPT | Performed by: INTERNAL MEDICINE

## 2017-07-28 PROCEDURE — 4004F PT TOBACCO SCREEN RCVD TLK: CPT | Performed by: INTERNAL MEDICINE

## 2017-07-28 PROCEDURE — G8417 CALC BMI ABV UP PARAM F/U: HCPCS | Performed by: INTERNAL MEDICINE

## 2017-07-28 PROCEDURE — 99213 OFFICE O/P EST LOW 20 MIN: CPT | Performed by: INTERNAL MEDICINE

## 2017-07-28 RX ORDER — CLOTRIMAZOLE AND BETAMETHASONE DIPROPIONATE 10; .64 MG/G; MG/G
CREAM TOPICAL
Qty: 45 G | Refills: 1 | Status: SHIPPED | OUTPATIENT
Start: 2017-07-28 | End: 2017-11-27 | Stop reason: ALTCHOICE

## 2017-08-10 ENCOUNTER — OFFICE VISIT (OUTPATIENT)
Age: 77
End: 2017-08-10

## 2017-08-10 VITALS
OXYGEN SATURATION: 95 % | HEIGHT: 69 IN | DIASTOLIC BLOOD PRESSURE: 64 MMHG | WEIGHT: 215.6 LBS | SYSTOLIC BLOOD PRESSURE: 120 MMHG | BODY MASS INDEX: 31.93 KG/M2 | HEART RATE: 60 BPM

## 2017-08-10 DIAGNOSIS — I48.0 PAROXYSMAL ATRIAL FIBRILLATION (HCC): Chronic | ICD-10-CM

## 2017-08-10 DIAGNOSIS — Z95.1 S/P CABG (CORONARY ARTERY BYPASS GRAFT): Chronic | ICD-10-CM

## 2017-08-10 DIAGNOSIS — I25.10 CORONARY ARTERY DISEASE INVOLVING NATIVE CORONARY ARTERY OF NATIVE HEART WITHOUT ANGINA PECTORIS: ICD-10-CM

## 2017-08-10 DIAGNOSIS — I50.32 CHRONIC DIASTOLIC CHF (CONGESTIVE HEART FAILURE), NYHA CLASS 2 (HCC): Primary | ICD-10-CM

## 2017-08-10 DIAGNOSIS — I73.9 PAD (PERIPHERAL ARTERY DISEASE) (HCC): ICD-10-CM

## 2017-08-10 PROCEDURE — 4004F PT TOBACCO SCREEN RCVD TLK: CPT | Performed by: INTERNAL MEDICINE

## 2017-08-10 PROCEDURE — G8598 ASA/ANTIPLAT THER USED: HCPCS | Performed by: INTERNAL MEDICINE

## 2017-08-10 PROCEDURE — G8417 CALC BMI ABV UP PARAM F/U: HCPCS | Performed by: INTERNAL MEDICINE

## 2017-08-10 PROCEDURE — 1123F ACP DISCUSS/DSCN MKR DOCD: CPT | Performed by: INTERNAL MEDICINE

## 2017-08-10 PROCEDURE — 99214 OFFICE O/P EST MOD 30 MIN: CPT | Performed by: INTERNAL MEDICINE

## 2017-08-10 PROCEDURE — G8427 DOCREV CUR MEDS BY ELIG CLIN: HCPCS | Performed by: INTERNAL MEDICINE

## 2017-08-10 PROCEDURE — 93000 ELECTROCARDIOGRAM COMPLETE: CPT | Performed by: INTERNAL MEDICINE

## 2017-08-10 PROCEDURE — 4040F PNEUMOC VAC/ADMIN/RCVD: CPT | Performed by: INTERNAL MEDICINE

## 2017-08-10 RX ORDER — TORSEMIDE 20 MG/1
40 TABLET ORAL DAILY
Qty: 60 TABLET | Refills: 5 | Status: SHIPPED | OUTPATIENT
Start: 2017-08-10 | End: 2018-01-16 | Stop reason: SDUPTHER

## 2017-08-11 ENCOUNTER — TELEPHONE (OUTPATIENT)
Dept: CARDIOLOGY CLINIC | Age: 77
End: 2017-08-11

## 2017-08-11 DIAGNOSIS — I25.10 CORONARY ARTERY DISEASE INVOLVING NATIVE CORONARY ARTERY OF NATIVE HEART WITHOUT ANGINA PECTORIS: Primary | ICD-10-CM

## 2017-08-28 ASSESSMENT — ENCOUNTER SYMPTOMS
SHORTNESS OF BREATH: 0
ABDOMINAL PAIN: 0

## 2017-08-30 ENCOUNTER — OFFICE VISIT (OUTPATIENT)
Dept: INTERNAL MEDICINE CLINIC | Age: 77
End: 2017-08-30

## 2017-08-30 VITALS
SYSTOLIC BLOOD PRESSURE: 136 MMHG | WEIGHT: 211 LBS | RESPIRATION RATE: 16 BRPM | HEART RATE: 64 BPM | DIASTOLIC BLOOD PRESSURE: 74 MMHG | HEIGHT: 69 IN | BODY MASS INDEX: 31.25 KG/M2

## 2017-08-30 DIAGNOSIS — I48.91 ATRIAL FIBRILLATION, UNSPECIFIED TYPE (HCC): ICD-10-CM

## 2017-08-30 DIAGNOSIS — I10 ESSENTIAL HYPERTENSION: Primary | Chronic | ICD-10-CM

## 2017-08-30 DIAGNOSIS — R07.2 PRECORDIAL PAIN: ICD-10-CM

## 2017-08-30 DIAGNOSIS — E78.2 MIXED HYPERLIPIDEMIA: Chronic | ICD-10-CM

## 2017-08-30 LAB
A/G RATIO: 1.8 (ref 1.1–2.2)
ALBUMIN SERPL-MCNC: 4.5 G/DL (ref 3.4–5)
ALP BLD-CCNC: 88 U/L (ref 40–129)
ALT SERPL-CCNC: 14 U/L (ref 10–40)
ANION GAP SERPL CALCULATED.3IONS-SCNC: 17 MMOL/L (ref 3–16)
AST SERPL-CCNC: 18 U/L (ref 15–37)
BILIRUB SERPL-MCNC: 0.3 MG/DL (ref 0–1)
BUN BLDV-MCNC: 19 MG/DL (ref 7–20)
CALCIUM SERPL-MCNC: 9.5 MG/DL (ref 8.3–10.6)
CHLORIDE BLD-SCNC: 93 MMOL/L (ref 99–110)
CHOLESTEROL, TOTAL: 196 MG/DL (ref 0–199)
CO2: 26 MMOL/L (ref 21–32)
CREAT SERPL-MCNC: 0.9 MG/DL (ref 0.8–1.3)
GFR AFRICAN AMERICAN: >60
GFR NON-AFRICAN AMERICAN: >60
GLOBULIN: 2.5 G/DL
GLUCOSE BLD-MCNC: 83 MG/DL (ref 70–99)
HDLC SERPL-MCNC: 41 MG/DL (ref 40–60)
LDL CHOLESTEROL CALCULATED: 97 MG/DL
POTASSIUM SERPL-SCNC: 4.8 MMOL/L (ref 3.5–5.1)
SODIUM BLD-SCNC: 136 MMOL/L (ref 136–145)
TOTAL PROTEIN: 7 G/DL (ref 6.4–8.2)
TRIGL SERPL-MCNC: 290 MG/DL (ref 0–150)
VLDLC SERPL CALC-MCNC: 58 MG/DL

## 2017-08-30 PROCEDURE — 99214 OFFICE O/P EST MOD 30 MIN: CPT | Performed by: INTERNAL MEDICINE

## 2017-08-30 PROCEDURE — G8598 ASA/ANTIPLAT THER USED: HCPCS | Performed by: INTERNAL MEDICINE

## 2017-08-30 PROCEDURE — 93000 ELECTROCARDIOGRAM COMPLETE: CPT | Performed by: INTERNAL MEDICINE

## 2017-08-30 PROCEDURE — 4004F PT TOBACCO SCREEN RCVD TLK: CPT | Performed by: INTERNAL MEDICINE

## 2017-08-30 PROCEDURE — 4040F PNEUMOC VAC/ADMIN/RCVD: CPT | Performed by: INTERNAL MEDICINE

## 2017-08-30 PROCEDURE — G8427 DOCREV CUR MEDS BY ELIG CLIN: HCPCS | Performed by: INTERNAL MEDICINE

## 2017-08-30 PROCEDURE — 1123F ACP DISCUSS/DSCN MKR DOCD: CPT | Performed by: INTERNAL MEDICINE

## 2017-08-30 PROCEDURE — G8417 CALC BMI ABV UP PARAM F/U: HCPCS | Performed by: INTERNAL MEDICINE

## 2017-08-30 RX ORDER — ISOSORBIDE MONONITRATE 30 MG/1
30 TABLET, EXTENDED RELEASE ORAL DAILY
Qty: 30 TABLET | Refills: 3 | Status: SHIPPED | OUTPATIENT
Start: 2017-08-30 | End: 2017-12-26 | Stop reason: SDUPTHER

## 2017-09-08 ENCOUNTER — OFFICE VISIT (OUTPATIENT)
Dept: NEUROLOGY | Age: 77
End: 2017-09-08

## 2017-09-08 VITALS
HEIGHT: 70 IN | SYSTOLIC BLOOD PRESSURE: 132 MMHG | HEART RATE: 54 BPM | OXYGEN SATURATION: 98 % | DIASTOLIC BLOOD PRESSURE: 74 MMHG | BODY MASS INDEX: 30.06 KG/M2 | WEIGHT: 210 LBS

## 2017-09-08 DIAGNOSIS — R41.82 ALTERED MENTAL STATUS, UNSPECIFIED ALTERED MENTAL STATUS TYPE: ICD-10-CM

## 2017-09-08 DIAGNOSIS — G40.209 PARTIAL SYMPTOMATIC EPILEPSY WITH COMPLEX PARTIAL SEIZURES, NOT INTRACTABLE, WITHOUT STATUS EPILEPTICUS (HCC): ICD-10-CM

## 2017-09-08 PROCEDURE — G8427 DOCREV CUR MEDS BY ELIG CLIN: HCPCS | Performed by: PSYCHIATRY & NEUROLOGY

## 2017-09-08 PROCEDURE — 1123F ACP DISCUSS/DSCN MKR DOCD: CPT | Performed by: PSYCHIATRY & NEUROLOGY

## 2017-09-08 PROCEDURE — G8598 ASA/ANTIPLAT THER USED: HCPCS | Performed by: PSYCHIATRY & NEUROLOGY

## 2017-09-08 PROCEDURE — G8417 CALC BMI ABV UP PARAM F/U: HCPCS | Performed by: PSYCHIATRY & NEUROLOGY

## 2017-09-08 PROCEDURE — 4004F PT TOBACCO SCREEN RCVD TLK: CPT | Performed by: PSYCHIATRY & NEUROLOGY

## 2017-09-08 PROCEDURE — 4040F PNEUMOC VAC/ADMIN/RCVD: CPT | Performed by: PSYCHIATRY & NEUROLOGY

## 2017-09-08 PROCEDURE — 99213 OFFICE O/P EST LOW 20 MIN: CPT | Performed by: PSYCHIATRY & NEUROLOGY

## 2017-09-08 RX ORDER — LEVETIRACETAM 750 MG/1
TABLET ORAL
Qty: 180 TABLET | Refills: 3 | Status: SHIPPED | OUTPATIENT
Start: 2017-09-08 | End: 2019-02-20 | Stop reason: SDUPTHER

## 2017-09-22 RX ORDER — AMIODARONE HYDROCHLORIDE 200 MG/1
TABLET ORAL
Qty: 60 TABLET | Refills: 5 | Status: SHIPPED | OUTPATIENT
Start: 2017-09-22 | End: 2017-11-28 | Stop reason: DRUGHIGH

## 2017-09-26 ENCOUNTER — TELEPHONE (OUTPATIENT)
Dept: CARDIOLOGY CLINIC | Age: 77
End: 2017-09-26

## 2017-09-26 RX ORDER — ROSUVASTATIN CALCIUM 20 MG/1
TABLET, COATED ORAL
Qty: 30 TABLET | Refills: 5 | Status: SHIPPED | OUTPATIENT
Start: 2017-09-26 | End: 2017-09-27 | Stop reason: SDUPTHER

## 2017-09-27 RX ORDER — ROSUVASTATIN CALCIUM 20 MG/1
TABLET, COATED ORAL
Qty: 30 TABLET | Refills: 11 | Status: SHIPPED | OUTPATIENT
Start: 2017-09-27 | End: 2018-04-12 | Stop reason: ALTCHOICE

## 2017-10-05 ENCOUNTER — OFFICE VISIT (OUTPATIENT)
Age: 77
End: 2017-10-05

## 2017-10-05 VITALS
WEIGHT: 208.25 LBS | HEIGHT: 71 IN | BODY MASS INDEX: 29.15 KG/M2 | HEART RATE: 48 BPM | OXYGEN SATURATION: 97 % | SYSTOLIC BLOOD PRESSURE: 110 MMHG | DIASTOLIC BLOOD PRESSURE: 62 MMHG

## 2017-10-05 DIAGNOSIS — I50.32 CHRONIC DIASTOLIC HEART FAILURE (HCC): Primary | ICD-10-CM

## 2017-10-05 DIAGNOSIS — I10 ESSENTIAL HYPERTENSION: Chronic | ICD-10-CM

## 2017-10-05 DIAGNOSIS — I73.9 PAD (PERIPHERAL ARTERY DISEASE) (HCC): ICD-10-CM

## 2017-10-05 DIAGNOSIS — I25.810 CORONARY ARTERY DISEASE INVOLVING CORONARY BYPASS GRAFT OF NATIVE HEART WITHOUT ANGINA PECTORIS: ICD-10-CM

## 2017-10-05 DIAGNOSIS — I48.0 PAROXYSMAL ATRIAL FIBRILLATION (HCC): ICD-10-CM

## 2017-10-05 PROCEDURE — 93000 ELECTROCARDIOGRAM COMPLETE: CPT | Performed by: INTERNAL MEDICINE

## 2017-10-05 PROCEDURE — 4004F PT TOBACCO SCREEN RCVD TLK: CPT | Performed by: INTERNAL MEDICINE

## 2017-10-05 PROCEDURE — 99214 OFFICE O/P EST MOD 30 MIN: CPT | Performed by: INTERNAL MEDICINE

## 2017-10-05 PROCEDURE — 1123F ACP DISCUSS/DSCN MKR DOCD: CPT | Performed by: INTERNAL MEDICINE

## 2017-10-05 PROCEDURE — G8427 DOCREV CUR MEDS BY ELIG CLIN: HCPCS | Performed by: INTERNAL MEDICINE

## 2017-10-05 PROCEDURE — G8417 CALC BMI ABV UP PARAM F/U: HCPCS | Performed by: INTERNAL MEDICINE

## 2017-10-05 PROCEDURE — G8484 FLU IMMUNIZE NO ADMIN: HCPCS | Performed by: INTERNAL MEDICINE

## 2017-10-05 PROCEDURE — G8598 ASA/ANTIPLAT THER USED: HCPCS | Performed by: INTERNAL MEDICINE

## 2017-10-05 PROCEDURE — 4040F PNEUMOC VAC/ADMIN/RCVD: CPT | Performed by: INTERNAL MEDICINE

## 2017-10-05 RX ORDER — METOPROLOL SUCCINATE 50 MG/1
25 TABLET, EXTENDED RELEASE ORAL NIGHTLY
Qty: 30 TABLET | Refills: 5
Start: 2017-10-05 | End: 2018-10-11

## 2017-10-05 NOTE — PATIENT INSTRUCTIONS
Atrial Fibrillation: Care Instructions  Your Care Instructions    Atrial fibrillation is an irregular and often fast heartbeat. Treating this condition is important for several reasons. It can cause blood clots, which can travel from your heart to your brain and cause a stroke. If you have a fast heartbeat, you may feel lightheaded, dizzy, and weak. An irregular heartbeat can also increase your risk for heart failure. Atrial fibrillation is often the result of another heart condition, such as high blood pressure or coronary artery disease. Making changes to improve your heart condition will help you stay healthy and active. Follow-up care is a key part of your treatment and safety. Be sure to make and go to all appointments, and call your doctor if you are having problems. It's also a good idea to know your test results and keep a list of the medicines you take. How can you care for yourself at home? Medicines  · Take your medicines exactly as prescribed. Call your doctor if you think you are having a problem with your medicine. You will get more details on the specific medicines your doctor prescribes. · If your doctor has given you a blood thinner to prevent a stroke, be sure you get instructions about how to take your medicine safely. Blood thinners can cause serious bleeding problems. · Do not take any vitamins, over-the-counter drugs, or herbal products without talking to your doctor first.  Lifestyle changes  · Do not smoke. Smoking can increase your chance of a stroke and heart attack. If you need help quitting, talk to your doctor about stop-smoking programs and medicines. These can increase your chances of quitting for good. · Eat a heart-healthy diet. · Stay at a healthy weight. Lose weight if you need to. · Limit alcohol to 2 drinks a day for men and 1 drink a day for women. Too much alcohol can cause health problems. · Avoid colds and flu. Get a pneumococcal vaccine shot.  If you have had numbness, tingling, weakness, or loss of movement in your face, arm, or leg, especially on only one side of your body. ¨ Sudden vision changes. ¨ Sudden trouble speaking. ¨ Sudden confusion or trouble understanding simple statements. ¨ Sudden problems with walking or balance. ¨ A sudden, severe headache that is different from past headaches. · You passed out (lost consciousness). Call your doctor now or seek immediate medical care if:  · You have new or increased shortness of breath. · You feel dizzy or lightheaded, or you feel like you may faint. · Your heart rate becomes irregular. · You can feel your heart flutter in your chest or skip heartbeats. Tell your doctor if these symptoms are new or worse. Watch closely for changes in your health, and be sure to contact your doctor if you have any problems. Where can you learn more? Go to https://Purpose Globalpepiceweb.Grow the Planet. org and sign in to your Hyper Wear account. Enter U020 in the Stocard box to learn more about \"Atrial Fibrillation: Care Instructions. \"     If you do not have an account, please click on the \"Sign Up Now\" link. Current as of: September 21, 2016  Content Version: 11.3  © 3157-5887 [employer]. Care instructions adapted under license by [facility] ([facility]). If you have questions about a medical condition or this instruction, always ask your healthcare professional. [place] [address] any warranty or liability for your use of this information. Heart-Healthy Diet: Care Instructions  Your Care Instructions    A heart-healthy diet has lots of vegetables, fruits, nuts, beans, and whole grains, and is low in salt. It limits foods that are high in saturated fat, such as meats, cheeses, and fried foods. It may be hard to change your diet, but even small changes can lower your risk of heart attack and heart disease. Follow-up care is a key part of your treatment and safety.  Be sure to make and go to all appointments, and call your doctor if you are having problems. It's also a good idea to know your test results and keep a list of the medicines you take. How can you care for yourself at home? Watch your portions  · Learn what a serving is. A \"serving\" and a \"portion\" are not always the same thing. Make sure that you are not eating larger portions than are recommended. For example, a serving of pasta is ½ cup. A serving size of meat is 2 to 3 ounces. A 3-ounce serving is about the size of a deck of cards. Measure serving sizes until you are good at [place]" them. Keep in mind that restaurants often serve portions that are 2 or 3 times the size of one serving. · To keep your energy level up and keep you from feeling hungry, eat often but in smaller portions. · Eat only the number of calories you need to stay at a healthy weight. If you need to lose weight, eat fewer calories than your body burns (through exercise and other physical activity). Eat more fruits and vegetables  · Eat a variety of fruit and vegetables every day. Dark green, deep orange, red, or yellow fruits and vegetables are especially good for you. Examples include spinach, carrots, peaches, and berries. · Keep carrots, celery, and other veggies handy for snacks. Buy fruit that is in season and store it where you can see it so that you will be tempted to eat it. · Cook dishes that have a lot of veggies in them, such as stir-fries and soups. Limit saturated and trans fat  · Read food labels, and try to avoid saturated and trans fats. They increase your risk of heart disease. Trans fat is found in many processed foods such as cookies and crackers. · Use olive or canola oil when you cook. Try cholesterol-lowering spreads, such as Benecol or Take Control. · Bake, broil, grill, or steam foods instead of frying them. · Choose lean meats instead of high-fat meats such as hot dogs and sausages.  Cut off all visible fat when you prepare [employer]. Care instructions adapted under license by [facility] ([facility]). If you have questions about a medical condition or this instruction, always ask your healthcare professional. [address] any warranty or liability for your use of this information. Learning About Heart Failure Zones  What are heart failure zones? Heart failure zones give you an easy way to see changes in your heart failure symptoms. They also tell you when you need to get help. Check every day to see which zone you are in. Green zone. You are doing well. This is where you want to be. · Your weight is stable. This means it is not going up or down. · You breathe easily. · You are sleeping well. You are able to lie flat without shortness of breath. · You can do your usual activities. Yellow zone. Be careful. Your symptoms are changing. Call your doctor. · You have new or increased shortness of breath. · You are dizzy or lightheaded, or you feel like you may faint. · You have sudden weight gain, such as more than 2 to 3 pounds in a day or 5 pounds in a week. (Your doctor may suggest a different range of weight gain.)  · You have increased swelling in your legs, ankles, or feet. · You are so tired or weak that you cannot do your usual activities. · You are not sleeping well. Shortness of breath wakes you up at night. You need extra pillows. Your doctor's name: ____________________________________________________________  Your doctor's contact information: _________________________________________________  Red zone. This is an emergency. Call 911. You have symptoms of sudden heart failure, such as:  · You have severe trouble breathing. · You cough up pink, foamy mucus. · You have a new irregular or fast heartbeat. You have symptoms of a heart attack. These may include:  · Chest pain or pressure, or a strange feeling in the chest.  · Sweating. · Shortness of breath. · Nausea or vomiting.   · Pain, pressure, or a strange feeling in the back, neck, jaw, or upper belly or in one or both shoulders or arms. · Lightheadedness or sudden weakness. · A fast or irregular heartbeat. If you have symptoms of a heart attack: After you call 911, the  may tell you to chew 1 adult-strength or 2 to 4 low-dose aspirin. Wait for an ambulance. Do not try to drive yourself. Follow-up care is a key part of your treatment and safety. Be sure to make and go to all appointments, and call your doctor if you are having problems. It's also a good idea to know your test results and keep a list of the medicines you take. Where can you learn more? Go to https://GenCell Biosystems.Oncodesign. org and sign in to your Vibrynt account. Enter T174 in the KylesItsMyURLs box to learn more about \"Learning About Heart Failure Zones. \"     If you do not have an account, please click on the \"Sign Up Now\" link. Current as of: February 23, 2017  Content Version: 11.3  © 6980-2384 [employer]. Care instructions adapted under license by [facility] ([facility]). If you have questions about a medical condition or this instruction, always ask your healthcare professional. [address]  any warranty or liability for your use of this information.

## 2017-10-05 NOTE — MR AVS SNAPSHOT
After Visit Summary             Pippa Al   10/5/2017 12:45 PM   Office Visit    Description:  Male : 1940   Provider:  Yovana Burgess MD   Department:  [facility] CARDIOLOGY              Your Follow-Up and Future Appointments         Below is a list of your follow-up and future appointments. This may not be a complete list as you may have made appointments directly with providers that we are not aware of or your providers may have made some for you. Please call your providers to confirm appointments. It is important to keep your appointments. Please bring your current insurance card, photo ID, co-pay, and all medication bottles to your appointment. If self-pay, payment is expected at the time of service. Your To-Do List     Future Appointments Provider Department Dept Phone    2017 10:00 AM Shavon Cm MD [facility] Internal Medicine [phone]    2018 11:00 AM Jose Shaver MD; [facility] LAB [address] Oncology [phone]    2018 11:00 AM Dru Black MD [facility]) Neurology [phone]    Please arrive 15 minutes prior to appointment time, bring insurance card and photo ID. Future Orders Complete By Expires    BASIC METABOLIC PANEL [ZAB15 Custom]  10/5/2017 10/5/2018    Follow-Up    Return in about 6 months (around 2018). Information from Your Visit        Department     Name Address Phone Fax    [place] [address] [address] [phone]      You Were Seen for:         Comments    Chronic diastolic heart failure ([address]   [428.32. ICD-9-CM]         Vital Signs     Blood Pressure Pulse Height Weight Oxygen Saturation Body Mass Index    110/62 (Site: Left Arm, Position: Sitting, Cuff Size: Large Adult) 48 5' 10.5\" (1.791 m) 208 lb 4 oz (94.5 kg) 97% 29.46 kg/m2    Smoking Status                   Current Every Day Smoker Additional Information about your Body Mass Index (BMI)           Your BMI as listed above is considered overweight (25.0-29.9). BMI is an estimate of body fat, calculated from your height and weight. The higher your BMI, the greater your risk of heart disease, high blood pressure, type 2 diabetes, stroke, gallstones, arthritis, sleep apnea, and certain cancers. BMI is not perfect. It may overestimate body fat in athletes and people who are more muscular. If your body fat is high you can improve your BMI by decreasing your calorie intake and becoming more physically active. Learn more at: [email or web address]          Instructions         Atrial Fibrillation: Care Instructions  Your Care Instructions    Atrial fibrillation is an irregular and often fast heartbeat. Treating this condition is important for several reasons. It can cause blood clots, which can travel from your heart to your brain and cause a stroke. If you have a fast heartbeat, you may feel lightheaded, dizzy, and weak. An irregular heartbeat can also increase your risk for heart failure. Atrial fibrillation is often the result of another heart condition, such as high blood pressure or coronary artery disease. Making changes to improve your heart condition will help you stay healthy and active. Follow-up care is a key part of your treatment and safety. Be sure to make and go to all appointments, and call your doctor if you are having problems. It's also a good idea to know your test results and keep a list of the medicines you take. How can you care for yourself at home? Medicines  · Take your medicines exactly as prescribed. Call your doctor if you think you are having a problem with your medicine. You will get more details on the specific medicines your doctor prescribes.   · If your doctor has given you a blood thinner to prevent a stroke, be side of your wrist, in line with your thumb. If your heartbeat seems uneven or fast, talk to your doctor. When should you call for help? Call 911 anytime you think you may need emergency care. For example, call if:  · You have symptoms of a heart attack. These may include:  ¨ Chest pain or pressure, or a strange feeling in the chest.  ¨ Sweating. ¨ Shortness of breath. ¨ Nausea or vomiting. ¨ Pain, pressure, or a strange feeling in the back, neck, jaw, or upper belly or in one or both shoulders or arms. ¨ Lightheadedness or sudden weakness. ¨ A fast or irregular heartbeat. After you call 911, the  may tell you to chew 1 adult-strength or 2 to 4 low-dose aspirin. Wait for an ambulance. Do not try to drive yourself. · You have symptoms of a stroke. These may include:  ¨ Sudden numbness, tingling, weakness, or loss of movement in your face, arm, or leg, especially on only one side of your body. ¨ Sudden vision changes. ¨ Sudden trouble speaking. ¨ Sudden confusion or trouble understanding simple statements. ¨ Sudden problems with walking or balance. ¨ A sudden, severe headache that is different from past headaches. · You passed out (lost consciousness). Call your doctor now or seek immediate medical care if:  · You have new or increased shortness of breath. · You feel dizzy or lightheaded, or you feel like you may faint. · Your heart rate becomes irregular. · You can feel your heart flutter in your chest or skip heartbeats. Tell your doctor if these symptoms are new or worse. Watch closely for changes in your health, and be sure to contact your doctor if you have any problems. Where can you learn more? Go to https://Idea.mepefranklinsarvaMAIL.iLost. org and sign in to your Biztag account. Enter U020 in the [employer] Network box to learn more about \"Atrial Fibrillation: Care Instructions. \"     If you do not have an account, please click on the \"Sign Up Now\" link. orange, red, or yellow fruits and vegetables are especially good for you. Examples include spinach, carrots, peaches, and berries. · Keep carrots, celery, and other veggies handy for snacks. Buy fruit that is in season and store it where you can see it so that you will be tempted to eat it. · Cook dishes that have a lot of veggies in them, such as stir-fries and soups. Limit saturated and trans fat  · Read food labels, and try to avoid saturated and trans fats. They increase your risk of heart disease. Trans fat is found in many processed foods such as cookies and crackers. · Use olive or canola oil when you cook. Try cholesterol-lowering spreads, such as Benecol or Take Control. · Bake, broil, grill, or steam foods instead of frying them. · Choose lean meats instead of high-fat meats such as hot dogs and sausages. Cut off all visible fat when you prepare meat. · Eat fish, skinless poultry, and meat alternatives such as soy products instead of high-fat meats. Soy products, such as tofu, may be especially good for your heart. · Choose low-fat or fat-free milk and dairy products. Eat fish  · Eat at least two servings of fish a week. Certain fish, such as salmon and tuna, contain omega-3 fatty acids, which may help reduce your risk of heart attack. Eat foods high in fiber  · Eat a variety of grain products every day. Include whole-grain foods that have lots of fiber and nutrients. Examples of whole-grain foods include oats, whole wheat bread, and brown rice. · Buy whole-grain breads and cereals, instead of white bread or pastries. Limit salt and sodium  · Limit how much salt and sodium you eat to help lower your blood pressure. · Taste food before you salt it. Add only a little salt when you think you need it. With time, your taste buds will adjust to less salt. · Eat fewer snack items, fast foods, and other high-salt, processed foods. Check food labels for the amount of sodium in packaged foods. Partial symptomatic epilepsy with complex partial seizures, not intractable, without status epilepticus (HCC)    Rotator cuff (capsule) sprain    Primary localized osteoarthrosis of shoulder region    Hip pain    Arm pain    Pain in joint, shoulder region    Complete rotator cuff tear of left shoulder    Biceps muscle tear    Contusion, hip    Ataxia    Cognitive changes    Recurrent falls    Tachycardia    Bronchitis    S/P CABG (coronary artery bypass graft) (Chronic)    Paroxysmal atrial fibrillation (HCC) (Chronic)    Migraine with aura    Bradycardia    Anxiety    Episodic altered awareness    EDSON (obstructive sleep apnea)    NSTEMI (non-ST elevated myocardial infarction) ([facility]    Peptic ulcer disease    Special screening for malignant neoplasm of prostate    GERD (gastroesophageal reflux disease)    Melanoma of skin ([facility]    Malignant neoplasm of colon (HCC)    Glucose intolerance (malabsorption)    Pure hypercholesterolemia    Coronary artery disease involving native coronary artery of native heart without angina pectoris      Immunizations as of 10/5/2017     Name Date    Influenza Vaccine, unspecified formulation 10/21/2016    Influenza Virus Vaccine 10/12/2015, 10/2/2013, 10/15/2012    Influenza Whole 10/29/2010    Pneumococcal 13-valent Conjugate ([id number]) 10/12/2015    Pneumococcal Polysaccharide ([id number]) 11/9/2012    Tdap (Boostrix, Adacel) 3/29/2016      Preventive Care        Date Due    Yearly Flu Vaccine (1) 9/1/2017    Cholesterol Screening 8/30/2022    Tetanus Combination Vaccine (2 - Td) 3/29/2026            Software [employer] Networkhart Signup           Software [employer] Networkhart allows you to send messages to your doctor, view your test results, renew your prescriptions, schedule appointments, view visit notes, and more. How Do I Sign Up? 1. In your Internet browser, go to [email or web address]  2. Click on the Sign Up Now link in the Sign In box. You will see the New Member Sign Up page.

## 2017-10-05 NOTE — PROGRESS NOTES
[facility] - Cardiology        Josephine Deng is a 76 y.o. patient with a past medical history significant for atrial fibrillation, seizure disorder and CAD with prior PCI and then CABG X 2 vessels in November of 2012. He has atrial fibrillation and underwent an ablation by my partner Dr. Dat Crouch. Unfortunately he had a recurrence of this and was placed on amiodarone and cardioverted. He was readmited to [facility] 2 weeks ago with some volume overload and was in a-fib again. I increased his amiodarone to 200mg and repeated his cardioversion. When I last saw him I decreased his Toprol XL to 50mg daily because of bradycardia. I also changed his furosemide to torsemide for fluid retention. He returns to the office today in follow-up. Since we last saw Janes Swenson he reports feeling good. He has had no shortness of breath. He describes an occasional nonproductive cough which he feels is due to allergies. There has been no chest pain or angina otherwise. He is not sure that the Imdur has changed anything for him with regard to how he feels. He has no significant limitations in his activities. He does endorse fatigue on some days but not on others. He still complains of chronic itching. He is compliant with his medications and tolerating them well. Denies any PND or orthopnea. Denies any cough productive or otherwise. No recent change in weight. No recent changes in bowel or bladder habits. No easy bleeding or bruising. Denies any arthralgias or myalgias. Review of systems is negative to a 12 point review except as noted above.     Current Outpatient Prescriptions   Medication Sig Dispense Refill    rosuvastatin (CRESTOR) 20 MG tablet TAKE 1 TABLET BY MOUTH DAILY 30 tablet 11    amiodarone (CORDARONE) 200 MG tablet TAKE 1 TABLET BY MOUTH DAILY 60 tablet 5    levETIRAcetam (KEPPRA) 750 MG tablet TAKE 1 tab BY MOUTH TWICE A DAY- 90 day supply 180 tablet 3    isosorbide mononitrate (IMDUR) 30 MG extended release tablet Take 1 tablet by mouth daily 30 tablet 3    torsemide (DEMADEX) 20 MG tablet Take 2 tablets by mouth daily 60 tablet 5    clotrimazole-betamethasone (LOTRISONE) 1-0.05 % cream Apply topically 2 times daily. 45 g 1    lisinopril (PRINIVIL;ZESTRIL) 5 MG tablet Take 1 tablet by mouth daily 90 tablet 3    metoprolol succinate (TOPROL XL) 50 MG extended release tablet Take 1 tablet by mouth nightly 30 tablet 6    rivaroxaban (XARELTO) 20 MG TABS tablet Take 1 tablet by mouth Daily with supper 30 tablet 6    escitalopram (LEXAPRO) 10 MG tablet TAKE 1 TABLET BY MOUTH DAILY 30 tablet 5    loratadine (CLARITIN) 10 MG tablet Take 1 tablet by mouth daily 30 tablet 3     No current facility-administered medications for this visit.         No Known Allergies    Physical Exam:  Vitals:    10/05/17 1301   BP: 110/62   Site: Left Arm   Position: Sitting   Cuff Size: Large Adult   Pulse: (!) 48   SpO2: 97%   Weight: 208 lb 4 oz (94.5 kg)   Height: 5' 10.5\" (1.791 m)        Wt Readings from Last 2 Encounters:   10/05/17 208 lb 4 oz (94.5 kg)   09/08/17 210 lb (95.3 kg)     General Appearance:  Alert, cooperative, no distress, appears stated age   Head:  Normocephalic, without obvious abnormality, atraumatic   Eyes:  PERRL, conjunctiva/corneas clear       Nose: Nares normal, no drainage or sinus tenderness   Throat: Lips, mucosa, and tongue normal   Neck: Supple, symmetrical, trachea midline, no adenopathy, thyroid: not enlarged, symmetric, no tenderness/mass/nodules, no carotid bruit or JVD       Lungs:   Clear to auscultation bilaterally, respirations unlabored   Chest Wall:  No tenderness or deformity   Heart:  Tachy and irreg irreg, S1, S2 normal, no murmur, rub or gallop   Abdomen:   Soft, non-tender, bowel sounds active all four quadrants,  no masses, no organomegaly   Extremities: Extremities normal, atraumatic, no cyanosis or edema   Pulses: Carotid      L   2 R2  Radial       L    2     R2     Skin: Petechial rash on bilateral lower legs above the sock line up to the knee   Pysch: Normal mood and affect   Neurologic: Normal     Pulses: Carotid      L   2     R2  Radial       L   2     R2  Femoral    L   2     R2  Popliteal    L   2     R2  DP            L   1 +doppler      R0 +doppler  PT             L   1 +dopple     R1 +doppler(weak)           Lab Results   Component Value Date    TRIG 290 08/30/2017    HDL 41 08/30/2017    LDLCALC 97 08/30/2017    LABVLDL 58 08/30/2017     Lab Results   Component Value Date     08/30/2017    K 4.8 08/30/2017    BUN 19 08/30/2017    CREATININE 0.9 08/30/2017    No components found for: HGBA1C    Lab Results   Component Value Date    TSH 2.65 03/10/2017     ECG 8/10/17: Sinus rhythm           10/5/17: sinus bradycardia HR 48    Echo 8/20/14:  Normal left ventricular size and systolic function. Ejection fraction is visually estimated to be 50%. Normal right ventricular size and function. The left atrium is mildly dilated. There is mild tricuspid regurgitation with RVSP estimated at 32 mmHg. CT head wo contrast:10/10/2015  IMPRESSION:   No acute intracranial abnormality. Carotid Duplex: 10/15/2015  Summary   The right internal carotid artery appears to have a 1-15% diameter reducing   stenosis based on velocity criteria. The right vertebral artery demonstrates normal antegrade flow. The left internal carotid artery appears to have a 1-15% diameter reducing   stenosis based on velocity criteria. The left vertebral artery demonstrates normal antegrade flow. Echo 10/15/15:  Left ventricle size is normal. Normal left ventricular wall thickness. Ejection fraction is visually estimated to be 55-60 %. No regional wall motion abnormalities are noted. Normal diastolic function. The aortic valve is thickened/calcified with decreased leaflet mobility.   The aortic valve area is calculated at 1.4 cm2 with a maximum

## 2017-10-10 DIAGNOSIS — I10 ESSENTIAL HYPERTENSION: Chronic | ICD-10-CM

## 2017-10-10 DIAGNOSIS — I50.32 CHRONIC DIASTOLIC HEART FAILURE (HCC): ICD-10-CM

## 2017-10-10 LAB
ANION GAP SERPL CALCULATED.3IONS-SCNC: 16 MMOL/L (ref 3–16)
BUN BLDV-MCNC: 14 MG/DL (ref 7–20)
CALCIUM SERPL-MCNC: 9.2 MG/DL (ref 8.3–10.6)
CHLORIDE BLD-SCNC: 94 MMOL/L (ref 99–110)
CO2: 26 MMOL/L (ref 21–32)
CREAT SERPL-MCNC: 0.9 MG/DL (ref 0.8–1.3)
GFR AFRICAN AMERICAN: >60
GFR NON-AFRICAN AMERICAN: >60
GLUCOSE BLD-MCNC: 84 MG/DL (ref 70–99)
POTASSIUM SERPL-SCNC: 5.1 MMOL/L (ref 3.5–5.1)
SODIUM BLD-SCNC: 136 MMOL/L (ref 136–145)

## 2017-10-18 RX ORDER — ESCITALOPRAM OXALATE 10 MG/1
TABLET ORAL
Qty: 30 TABLET | Refills: 5 | Status: SHIPPED | OUTPATIENT
Start: 2017-10-18 | End: 2018-04-03 | Stop reason: SDUPTHER

## 2017-11-22 ENCOUNTER — TELEPHONE (OUTPATIENT)
Dept: PHARMACY | Facility: CLINIC | Age: 77
End: 2017-11-22

## 2017-11-27 RX ORDER — ACETAMINOPHEN 500 MG
500 TABLET ORAL EVERY 6 HOURS PRN
COMMUNITY

## 2017-11-27 RX ORDER — MULTIVITAMIN WITH IRON
250 TABLET ORAL DAILY
COMMUNITY

## 2017-11-27 ASSESSMENT — ENCOUNTER SYMPTOMS
ABDOMINAL PAIN: 0
SHORTNESS OF BREATH: 0

## 2017-11-27 NOTE — PROGRESS NOTES
Subjective:      Patient ID: Alex Quiroz is a 76 y.o. male. HPI  1. Acute diastolic congestive heart failure ([address] --Stable--no new issues      2. Essential hypertension --Stable--no new issues      3. Mixed hyperlipidemia --Stable--no new issues----lab noted and reviewed      Did see dr Nav Waderes   Did see dr dumont--decr toprol-xl to 25 mg po daily   Imdur I added has helped his positional c/p --I recc cont that   Is going to start a shot from son-in-law-- to incr energy-- I do NOT recommend   Also uses mucinex-D--I said no-- D--only plain--   Lots of seafood --   Review of Systems   Respiratory: Negative for shortness of breath. Cardiovascular: Negative for chest pain. Gastrointestinal: Negative for abdominal pain. Objective:   Physical Exam   HENT:   Head: Normocephalic. Mod pnd--clear mucus    Eyes: Pupils are equal, round, and reactive to light. Neck: Normal range of motion. Cardiovascular: Normal rate and normal heart sounds. Pulmonary/Chest: Effort normal.   Abdominal: Soft. Musculoskeletal: He exhibits edema. Neurological: He is alert. Skin: Skin is warm. Assessment:      1. Acute diastolic congestive heart failure (HCC) --stable --Continue current therapy      2. Essential hypertension --Continue current therapy  --avoid mucionex-D --use nasonex --     3.  Mixed hyperlipidemia --Continue current therapy      rto 6 mos --ext ov   Rhinitis--add nasonex--       Plan:
Alert-The patient is alert, awake and responds to voice. The patient is oriented to time, place, and person. The triage nurse is able to obtain subjective information.

## 2017-11-28 RX ORDER — AMIODARONE HYDROCHLORIDE 200 MG/1
100 TABLET ORAL DAILY
COMMUNITY
End: 2018-10-08

## 2017-12-01 ENCOUNTER — OFFICE VISIT (OUTPATIENT)
Dept: INTERNAL MEDICINE CLINIC | Age: 77
End: 2017-12-01

## 2017-12-01 VITALS
DIASTOLIC BLOOD PRESSURE: 72 MMHG | WEIGHT: 209 LBS | SYSTOLIC BLOOD PRESSURE: 130 MMHG | HEART RATE: 84 BPM | HEIGHT: 69 IN | RESPIRATION RATE: 16 BRPM | BODY MASS INDEX: 30.96 KG/M2

## 2017-12-01 DIAGNOSIS — I50.31 ACUTE DIASTOLIC CONGESTIVE HEART FAILURE (HCC): Primary | ICD-10-CM

## 2017-12-01 DIAGNOSIS — Z23 NEED FOR INFLUENZA VACCINATION: ICD-10-CM

## 2017-12-01 DIAGNOSIS — I10 ESSENTIAL HYPERTENSION: Chronic | ICD-10-CM

## 2017-12-01 DIAGNOSIS — E78.2 MIXED HYPERLIPIDEMIA: Chronic | ICD-10-CM

## 2017-12-01 PROCEDURE — G8428 CUR MEDS NOT DOCUMENT: HCPCS | Performed by: INTERNAL MEDICINE

## 2017-12-01 PROCEDURE — 90662 IIV NO PRSV INCREASED AG IM: CPT | Performed by: INTERNAL MEDICINE

## 2017-12-01 PROCEDURE — G0008 ADMIN INFLUENZA VIRUS VAC: HCPCS | Performed by: INTERNAL MEDICINE

## 2017-12-01 PROCEDURE — G8598 ASA/ANTIPLAT THER USED: HCPCS | Performed by: INTERNAL MEDICINE

## 2017-12-01 PROCEDURE — 4004F PT TOBACCO SCREEN RCVD TLK: CPT | Performed by: INTERNAL MEDICINE

## 2017-12-01 PROCEDURE — G8484 FLU IMMUNIZE NO ADMIN: HCPCS | Performed by: INTERNAL MEDICINE

## 2017-12-01 PROCEDURE — 99214 OFFICE O/P EST MOD 30 MIN: CPT | Performed by: INTERNAL MEDICINE

## 2017-12-01 PROCEDURE — 1123F ACP DISCUSS/DSCN MKR DOCD: CPT | Performed by: INTERNAL MEDICINE

## 2017-12-01 PROCEDURE — 4040F PNEUMOC VAC/ADMIN/RCVD: CPT | Performed by: INTERNAL MEDICINE

## 2017-12-01 PROCEDURE — G8417 CALC BMI ABV UP PARAM F/U: HCPCS | Performed by: INTERNAL MEDICINE

## 2017-12-01 RX ORDER — MOMETASONE FUROATE 50 UG/1
2 SPRAY, METERED NASAL 2 TIMES DAILY
Qty: 1 INHALER | Refills: 3 | Status: SHIPPED | OUTPATIENT
Start: 2017-12-01 | End: 2018-06-08

## 2017-12-07 ENCOUNTER — TELEPHONE (OUTPATIENT)
Dept: INTERNAL MEDICINE CLINIC | Age: 77
End: 2017-12-07

## 2017-12-07 NOTE — TELEPHONE ENCOUNTER
Called patient's wife and scheduled an appt for the patient with   Dr. Alison Griffith for 12/8 at [facility]

## 2017-12-07 NOTE — TELEPHONE ENCOUNTER
Patient's wife is calling on behalf of her  stating he has collected fluid on his elbow again and she is asking how should this be handled, she is aware you are out until Monday but states she will wait for your response please call her at [phone]

## 2017-12-07 NOTE — TELEPHONE ENCOUNTER
I believe this is bursitis--can we get him appt at our office to have elbow drained by someone--alt is ortho eval --he will want something soon so see if we can assist him--

## 2017-12-08 ENCOUNTER — OFFICE VISIT (OUTPATIENT)
Dept: INTERNAL MEDICINE CLINIC | Age: 77
End: 2017-12-08

## 2017-12-08 VITALS
DIASTOLIC BLOOD PRESSURE: 60 MMHG | SYSTOLIC BLOOD PRESSURE: 118 MMHG | OXYGEN SATURATION: 99 % | WEIGHT: 214 LBS | BODY MASS INDEX: 31.6 KG/M2 | HEART RATE: 58 BPM

## 2017-12-08 DIAGNOSIS — M70.21 OLECRANON BURSITIS OF RIGHT ELBOW: Primary | ICD-10-CM

## 2017-12-08 LAB
APPEARANCE FLUID: NORMAL
CELL COUNT FLUID TYPE: NORMAL
CLOT EVALUATION: NORMAL
COLOR FLUID: NORMAL
EOSINOPHIL FLUID: 1 %
LYMPHOCYTES, BODY FLUID: 40 %
MONOCYTE, FLUID: 7 %
NEUTROPHIL, FLUID: 52 %
NUCLEATED CELLS FLUID: 980 /CUMM
NUMBER OF CELLS COUNTED FLUID: 100
RBC FLUID: >5000 /CUMM

## 2017-12-08 PROCEDURE — 99212 OFFICE O/P EST SF 10 MIN: CPT | Performed by: INTERNAL MEDICINE

## 2017-12-08 ASSESSMENT — ENCOUNTER SYMPTOMS
DIARRHEA: 0
WHEEZING: 0
SORE THROAT: 0
NAUSEA: 0
VOMITING: 0
TROUBLE SWALLOWING: 0
SHORTNESS OF BREATH: 0
ABDOMINAL PAIN: 0

## 2017-12-08 NOTE — PROGRESS NOTES
Department of Internal Medicine  Clinic Note    Date: 12/8/2017                                               Subjective/Objective:     Chief Complaint   Patient presents with    Bursitis     lg swollen area on rt elbow for a few days. Here to have it drained. HPI: Patient presents today for evaluation and drainage of olecranon bursitis. Patient states this has happened once before, years ago, and he required drainage. Patient denies any trauma to the site and endorses only minor pain with full extension of the elbow. There is no erythema or warmth over the site. IN OFFICE PROCEDURE NOTE    Saul Metzger          Preop Dx: Olecranon bursitis    Postop Dx: Olecranon bursitis    Procedure:Pt was brought to the procedure room and placed on the table. The lesion of the right elbow was prepped and anesthetized. The lesion was aspirated with roughly 20cc return of bloody fluid. Sterile dressing and tape was applied. Anesthesia:1% xylocaine plain    Specimens: Sent for cell count and culture    Instructions sheet for wound care given to patient. Gio Ferguson. Patient Active Problem List    Diagnosis Date Noted    Acute diastolic congestive heart failure ([facility] 10/28/2016     Priority: High    TIA (transient ischemic attack) 10/10/2015     Priority: High    Hyponatremia 03/17/2014     Priority: High    TIA (transient ischemic attack) 07/09/2013     Priority: High    Coronary artery disease without angina pectoris 11/15/2012     Priority: High    Malaise and fatigue      Priority: High    Atrial fibrillation ([address] 11/28/2014     Priority: Medium    Seizure disorder ([address] 03/18/2014     Priority: Medium    Essential hypertension 11/15/2012     Priority: Medium    Carotid artery stenosis      Priority: Medium    Hyperlipidemia      Priority: Medium    Sleep apnea      Priority: Medium    History of total left knee replacement-7. 1.2016 07/29/2016     Priority: Low    Occasional cigarette smoker 03/01/2013     Priority: Low    Rash 07/27/2017    Olecranon bursitis of left elbow 07/17/2017    Altered mental status 03/03/2017    Tobacco abuse 01/19/2017    Rupture of flexor tendon of right hand 12/13/2016    Wound, open, finger, with tendon involvement 12/13/2016    Cellulitis of right middle finger 12/13/2016    Cellulitis of index finger 12/13/2016    Atrial fibrillation status post cardioversion ([facility] 11/08/2016    Mild reactive airways disease 11/01/2016    Coronary artery disease involving coronary bypass graft of native heart with angina pectoris ([address]     Wheezing     SOB (shortness of breath)     Allergic rhinitis 09/19/2016    Insomnia 09/19/2016    Arthritis of left knee 07/01/2016    Pre-op evaluation 06/15/2016    Slurred speech 06/14/2016    Knee pain, left 05/02/2016    Arthritis of left knee 05/02/2016    Leg abrasion 03/29/2016    Hand pain 03/15/2016    Dysarthria 10/12/2015    Partial symptomatic epilepsy with complex partial seizures, not intractable, without status epilepticus ([facility] 09/07/2015    Rotator cuff (capsule) sprain 04/23/2015    Primary localized osteoarthrosis of shoulder region 04/23/2015    Hip pain 03/30/2015    Arm pain 03/30/2015    Pain in joint, shoulder region 03/30/2015    Complete rotator cuff tear of left shoulder 03/30/2015    Biceps muscle tear 03/30/2015    Contusion, hip 03/30/2015    Ataxia 03/27/2015    Cognitive changes 03/27/2015    Recurrent falls 02/16/2015    Tachycardia 02/06/2015    Bronchitis 01/23/2015    Atrial flutter (HCC)     Paroxysmal atrial fibrillation ([address] 09/30/2014    S/P CABG (coronary artery bypass graft)     Migraine with aura 03/21/2014    Bradycardia 03/18/2014    Anxiety 01/14/2014    Episodic altered awareness 09/13/2013    EDSON (obstructive sleep apnea) 11/15/2012    NSTEMI (non-ST elevated myocardial infarction) ([address] 11/09/2012    Peptic ulcer disease     Special screening for malignant neoplasm of prostate     GERD (gastroesophageal reflux disease)     Melanoma of skin (HCC)     Malignant neoplasm of colon (HCC)     Glucose intolerance (malabsorption)     Pure hypercholesterolemia     Coronary artery disease involving native coronary artery of native heart without angina pectoris        Past Medical History:   Diagnosis Date    Acute diastolic congestive heart failure ([address] 10/28/2016    Anxiety     Atrial fibrillation ([address] 11/28/2014    s/p ablation 2015    CAD (coronary artery disease)     Cancer (HCC)     colon    Carotid artery stenosis     Cataracts, bilateral     Diverticulitis of colon     GERD (gastroesophageal reflux disease)     Hyperlipidemia     Hypertension     Liver disease 1959    in the [employer] in [place] ([facility] had Hepatitis    Melanoma in situ of back ([address]     Melanoma in situ of upper arm ([address] 2005    left/ excision by Dr Florez Slice    Migraine with aura 3/21/2014    MRSA infection 12/13/2016    Osteoarthritis     Peptic ulcer disease     Seizures ([address]     Skin cancer (melanoma) ([address]     on back     Tobacco abuse 1/19/2017    Unspecified sleep apnea        Past Surgical History:   Procedure Laterality Date    CAROTID ENDARTERECTOMY  3-1999    COLON SURGERY  4/15/05    Right colectomy to removal of cecum and terminal ileum    COLONOSCOPY      CORONARY ANGIOPLASTY WITH STENT PLACEMENT      CORONARY ARTERY BYPASS GRAFT  11/14/12    x 2 (LIMA-LAD, SVG-OM1)    EYE SURGERY      FRACTURE SURGERY Left 1959    left arm    KNEE ARTHROSCOPY      LIPOMA RESECTION  2009    Removal lipoma left posterior neck by Dr Brielle Johnson  9/13/05    Excision left arm primary melamoma and sentinel lymph node bs of left axilla by Dr Zari Engle 07/01/2016    Left Total Knee Replacement    UPPER GASTROINTESTINAL ENDOSCOPY         Orders Only on 10/10/2017   Component Date Value Ref Range Status    Sodium 10/10/2017 136  136 - 145 mmol/L Final    Potassium 10/10/2017 5.1  3.5 - 5.1 mmol/L Final    Chloride 10/10/2017 94* 99 - 110 mmol/L Final    CO2 10/10/2017 26  21 - 32 mmol/L Final    Anion Gap 10/10/2017 16  3 - 16 Final    Glucose 10/10/2017 84  70 - 99 mg/dL Final    BUN 10/10/2017 14  7 - 20 mg/dL Final    CREATININE 10/10/2017 0.9  0.8 - 1.3 mg/dL Final    GFR Non- 10/10/2017 >60  >60 Final    Comment: >60 mL/min/1.73m2 EGFR, calc. for ages 25 and older using the  MDRD formula (not corrected for weight), is valid for stable  renal function.  GFR  10/10/2017 >60  >60 Final    Comment: Chronic Kidney Disease: less than 60 ml/min/1.73 sq.m. Kidney Failure: less than 15 ml/min/1.73 sq.m. Results valid for patients 18 years and older.       Calcium 10/10/2017 9.2  8.3 - 10.6 mg/dL Final       Family History   Problem Relation Age of Onset    Cancer Father     Cancer Sister     Cancer Brother     Cancer Brother     Cancer Brother        Current Outpatient Prescriptions   Medication Sig Dispense Refill    mometasone (NASONEX) 50 MCG/ACT nasal spray 2 sprays by Nasal route 2 times daily 1 Inhaler 3    amiodarone (CORDARONE) 200 MG tablet Take 100 mg by mouth daily      magnesium (MAGNESIUM-OXIDE) 250 MG TABS tablet Take 250 mg by mouth daily      acetaminophen (TYLENOL) 500 MG tablet Take 500 mg by mouth every 6 hours as needed      escitalopram (LEXAPRO) 10 MG tablet TAKE 1 TABLET BY MOUTH DAILY 30 tablet 5    metoprolol succinate (TOPROL XL) 50 MG extended release tablet Take 0.5 tablets by mouth nightly 30 tablet 5    rosuvastatin (CRESTOR) 20 MG tablet TAKE 1 TABLET BY MOUTH DAILY 30 tablet 11    levETIRAcetam (KEPPRA) 750 MG tablet TAKE 1 tab BY MOUTH TWICE A DAY- 90 day supply 180 tablet 3    isosorbide mononitrate (IMDUR) 30 MG extended release tablet Take 1 tablet by mouth daily 30 tablet 3    torsemide (DEMADEX) 20 MG tablet Take 2 tablets by mouth daily 60 tablet 5    lisinopril (PRINIVIL;ZESTRIL) 5 MG tablet Take 1 tablet by mouth daily 90 tablet 3    rivaroxaban (XARELTO) 20 MG TABS tablet Take 1 tablet by mouth Daily with supper 30 tablet 6    loratadine (CLARITIN) 10 MG tablet Take 1 tablet by mouth daily 30 tablet 3     No current facility-administered medications for this visit. No Known Allergies    Review of Systems   Constitutional: Negative for chills, fatigue and fever. HENT: Negative for ear pain, sore throat, tinnitus and trouble swallowing. Eyes: Negative for visual disturbance. Respiratory: Negative for shortness of breath and wheezing. Cardiovascular: Negative for chest pain and palpitations. Gastrointestinal: Negative for abdominal pain, diarrhea, nausea and vomiting. Endocrine: Negative for cold intolerance and heat intolerance. Genitourinary: Negative for difficulty urinating and dysuria. Musculoskeletal: Positive for arthralgias (Minor pain with full extension of right elbow) and joint swelling. Neurological: Negative for dizziness, weakness and numbness. Psychiatric/Behavioral: Negative for agitation, decreased concentration and suicidal ideas. The patient is not nervous/anxious. All other systems reviewed and are negative. Vitals:  /60 (Site: Right Arm, Cuff Size: Large Adult)   Pulse 58   Wt 214 lb (97.1 kg)   SpO2 99%   BMI 31.60 kg/m²     Physical Exam   Constitutional: He is oriented to person, place, and time. He appears well-developed and well-nourished. HENT:   Head: Normocephalic and atraumatic. Eyes: Conjunctivae and lids are normal. Pupils are equal, round, and reactive to light. Neck: Trachea normal and normal range of motion. No hepatojugular reflux and no JVD present. Carotid bruit is not present. No thyromegaly present.    Cardiovascular: Normal rate, regular rhythm and normal heart sounds. No murmur heard. Pulmonary/Chest: Effort normal and breath sounds normal. No respiratory distress. Abdominal: Soft. Normal appearance and bowel sounds are normal. He exhibits no distension. There is no tenderness. Musculoskeletal: Normal range of motion. Arms:  Lymphadenopathy:     He has no cervical adenopathy. Neurological: He is alert and oriented to person, place, and time. He has normal strength. No cranial nerve deficit or sensory deficit. Skin: Skin is warm, dry and intact. No rash noted. No cyanosis. Nails show no clubbing. Psychiatric: He has a normal mood and affect. His speech is normal and behavior is normal. Thought content normal.       Assessment/Plan     1. Olecranon bursitis of right elbow  - BODY FLUID CULTURE  - BODY FLUID CELL COUNT WITH DIFFERENTIAL  - Marquis Villela MD (orthopedic surgery)      Orders Placed This Encounter   Procedures    BODY FLUID CULTURE     Right elbow    BODY FLUID CELL COUNT WITH DIFFERENTIAL     Order Specific Question:   Specify Specimen Type     Answer:   Marianne Holley MD (orthopedic surgery)     Referral Priority:   Routine     Referral Type:   Consult for Advice and Opinion     Referral Reason:   Specialty Services Required     Referred to Provider:   Rah Mcdonough MD     Requested Specialty:   Orthopedic Surgery     Number of Visits Requested:   1       Return if symptoms worsen or fail to improve. Roldan Gonzalez MD     12/8/2017  12:58 PM    Documentation was done using voice recognition dragon software. Every effort was made to ensure accuracy; however, inadvertent unintentional computerized transcription errors may be present.

## 2017-12-08 NOTE — TELEPHONE ENCOUNTER
Daughter left message this morning, returning below call. Reached daughter, but she is now out/about, so doesn't have medication list with her.  She will call me back later this afternoon once she is back home and with the medication list.
Noted 12/1 appt and below PCP response.    =======================================================   For Pharmacy Admin Tracking Only    PHSO: Yes  Total # of Interventions Recommended: 4  - Decreased Dose #: 1  - Discontinued Medication #: 1 Discontinue Reason(s): Unnecessary Medication  - New Order #: 1 New Medication Order Reason(s): Interaction  - Updated Order #: 1 Updated Order Reason(s):  Other  Total Interventions Accepted: 4  Time Spent (min): 45
Please call patient---I am OK with mucinex with DM but not decongestant-so muc-dm but not mucinex-D--also bring me list of meds for injection at upcoming ov to review please
for his review/discussion

## 2017-12-11 ENCOUNTER — TELEPHONE (OUTPATIENT)
Dept: INTERNAL MEDICINE CLINIC | Age: 77
End: 2017-12-11

## 2017-12-11 ENCOUNTER — OFFICE VISIT (OUTPATIENT)
Dept: INTERNAL MEDICINE CLINIC | Age: 77
End: 2017-12-11

## 2017-12-11 VITALS — DIASTOLIC BLOOD PRESSURE: 60 MMHG | RESPIRATION RATE: 16 BRPM | HEART RATE: 72 BPM | SYSTOLIC BLOOD PRESSURE: 130 MMHG

## 2017-12-11 DIAGNOSIS — I48.0 PAROXYSMAL ATRIAL FIBRILLATION (HCC): ICD-10-CM

## 2017-12-11 DIAGNOSIS — I10 ESSENTIAL HYPERTENSION: Chronic | ICD-10-CM

## 2017-12-11 DIAGNOSIS — M70.22 OLECRANON BURSITIS OF LEFT ELBOW: Primary | ICD-10-CM

## 2017-12-11 PROCEDURE — G8484 FLU IMMUNIZE NO ADMIN: HCPCS | Performed by: INTERNAL MEDICINE

## 2017-12-11 PROCEDURE — 4004F PT TOBACCO SCREEN RCVD TLK: CPT | Performed by: INTERNAL MEDICINE

## 2017-12-11 PROCEDURE — 4040F PNEUMOC VAC/ADMIN/RCVD: CPT | Performed by: INTERNAL MEDICINE

## 2017-12-11 PROCEDURE — G8417 CALC BMI ABV UP PARAM F/U: HCPCS | Performed by: INTERNAL MEDICINE

## 2017-12-11 PROCEDURE — G8428 CUR MEDS NOT DOCUMENT: HCPCS | Performed by: INTERNAL MEDICINE

## 2017-12-11 PROCEDURE — 1123F ACP DISCUSS/DSCN MKR DOCD: CPT | Performed by: INTERNAL MEDICINE

## 2017-12-11 PROCEDURE — 20605 DRAIN/INJ JOINT/BURSA W/O US: CPT | Performed by: INTERNAL MEDICINE

## 2017-12-11 PROCEDURE — G8598 ASA/ANTIPLAT THER USED: HCPCS | Performed by: INTERNAL MEDICINE

## 2017-12-11 PROCEDURE — 99213 OFFICE O/P EST LOW 20 MIN: CPT | Performed by: INTERNAL MEDICINE

## 2017-12-11 ASSESSMENT — ENCOUNTER SYMPTOMS
SHORTNESS OF BREATH: 0
ABDOMINAL PAIN: 0

## 2017-12-11 NOTE — PROGRESS NOTES
Subjective:      Patient ID: Oanh Galan is a 76 y.o. male. HPI  1. Olecranon bursitis of left elbow --recc bloody effusion--tapped 3 days ago --no pain --on xarelto for paroxys atr fib     2. Paroxysmal atrial fibrillation (HCC) --Continue current therapy      3. Essential hypertension --.crx         Review of Systems   Respiratory: Negative for shortness of breath. Cardiovascular: Negative for chest pain. Gastrointestinal: Negative for abdominal pain. Objective:   Physical Exam   HENT:   Head: Normocephalic. Eyes: Pupils are equal, round, and reactive to light. Neck: Normal range of motion. Cardiovascular: Normal rate and normal heart sounds. Abdominal: Soft. Musculoskeletal:   Rt elbow--bulging olecanon fluid--12  Cc grossly bloody fluid removed under sterile conditions--not sent for cx--pressure drssing and ace wrap applied--will see ortho soon --no evidence of cellulitis    Skin: Skin is warm. No erythema. Assessment:      1. Olecranon bursitis of right elbow --12 c grosslt bloody fluid removed--pressure dressing--we will call for ortho appt -- hold xarelto x 2 doses     2. Paroxysmal atrial fibrillation (HCC) --Continue current therapy      3.  Essential hypertension --Continue current therapy      Infect inst given         Plan:

## 2017-12-12 ENCOUNTER — OFFICE VISIT (OUTPATIENT)
Dept: ORTHOPEDIC SURGERY | Age: 77
End: 2017-12-12

## 2017-12-12 VITALS
HEART RATE: 59 BPM | HEIGHT: 69 IN | BODY MASS INDEX: 30.96 KG/M2 | WEIGHT: 209 LBS | DIASTOLIC BLOOD PRESSURE: 62 MMHG | SYSTOLIC BLOOD PRESSURE: 113 MMHG | TEMPERATURE: 97.7 F

## 2017-12-12 DIAGNOSIS — M70.21 OLECRANON BURSITIS OF RIGHT ELBOW: Primary | ICD-10-CM

## 2017-12-12 PROCEDURE — G8598 ASA/ANTIPLAT THER USED: HCPCS | Performed by: PHYSICIAN ASSISTANT

## 2017-12-12 PROCEDURE — 4040F PNEUMOC VAC/ADMIN/RCVD: CPT | Performed by: PHYSICIAN ASSISTANT

## 2017-12-12 PROCEDURE — G8484 FLU IMMUNIZE NO ADMIN: HCPCS | Performed by: PHYSICIAN ASSISTANT

## 2017-12-12 PROCEDURE — G8417 CALC BMI ABV UP PARAM F/U: HCPCS | Performed by: PHYSICIAN ASSISTANT

## 2017-12-12 PROCEDURE — 1123F ACP DISCUSS/DSCN MKR DOCD: CPT | Performed by: PHYSICIAN ASSISTANT

## 2017-12-12 PROCEDURE — 99213 OFFICE O/P EST LOW 20 MIN: CPT | Performed by: PHYSICIAN ASSISTANT

## 2017-12-12 PROCEDURE — G8427 DOCREV CUR MEDS BY ELIG CLIN: HCPCS | Performed by: PHYSICIAN ASSISTANT

## 2017-12-12 PROCEDURE — 4004F PT TOBACCO SCREEN RCVD TLK: CPT | Performed by: PHYSICIAN ASSISTANT

## 2017-12-12 NOTE — PROGRESS NOTES
acute fracture or dislocation noted. Small traction spur off the tip of the olecranon. Moderate soft tissue swelling within the olecranon region. Assessment:       ICD-10-CM ICD-9-CM    1. Olecranon bursitis of right elbow M70.21 726.33 XR ELBOW RIGHT (2 VIEWS)        Plan:     The natural history of the patient's diagnosis as well as the treatment options were discussed in full and questions were answered. Risks and benefits of the treatment options also reviewed in detail. More than likely he has a traumatic olecranon bursitis right elbow. He must have bumped his elbow unknowingly developed a bloody effusion of the olecranon bursa. I discussed risk and benefits of repeat aspiration. Also discussed option of excision of the olecranon bursa. I'm not recommending either of these today. As long as he avoids direct pressure over the olecranon region, this should subside on its own. He should keep the area clean and dry. If he develops redness or increased pain or increased swelling he should notify the office. Follow Up:   Call or return to clinic prn if these symptoms worsen or fail to improve as anticipated.

## 2017-12-26 RX ORDER — ISOSORBIDE MONONITRATE 30 MG/1
30 TABLET, EXTENDED RELEASE ORAL DAILY
Qty: 30 TABLET | Refills: 5 | Status: SHIPPED | OUTPATIENT
Start: 2017-12-26 | End: 2018-04-29 | Stop reason: SDUPTHER

## 2017-12-27 RX ORDER — RIVAROXABAN 20 MG/1
20 TABLET, FILM COATED ORAL
Qty: 90 TABLET | Refills: 3 | Status: SHIPPED | OUTPATIENT
Start: 2017-12-27 | End: 2019-01-04 | Stop reason: SDUPTHER

## 2018-01-01 LAB
CULTURE, JOINT AEROBIC: NORMAL
CULTURE, JOINT ANAEROBIC: NORMAL

## 2018-01-02 ENCOUNTER — HOSPITAL ENCOUNTER (OUTPATIENT)
Dept: CT IMAGING | Age: 78
Discharge: HOME | End: 2018-01-02
Attending: INTERNAL MEDICINE | Admitting: INTERNAL MEDICINE

## 2018-01-02 DIAGNOSIS — C43.9 MELANOMA OF SKIN (HCC): ICD-10-CM

## 2018-01-02 DIAGNOSIS — C43.9 MALIGNANT MELANOMA OF SKIN (HCC): ICD-10-CM

## 2018-01-02 LAB
GFR AFRICAN AMERICAN: >60
GFR NON-AFRICAN AMERICAN: 53
PERFORMED ON: ABNORMAL
POC CREATININE: 1.3 MG/DL (ref 0.8–1.3)
POC SAMPLE TYPE: ABNORMAL

## 2018-01-18 RX ORDER — TORSEMIDE 20 MG/1
40 TABLET ORAL DAILY
Qty: 60 TABLET | Refills: 3 | Status: SHIPPED | OUTPATIENT
Start: 2018-01-18 | End: 2018-04-29 | Stop reason: SDUPTHER

## 2018-01-23 ENCOUNTER — TELEPHONE (OUTPATIENT)
Dept: INTERNAL MEDICINE CLINIC | Age: 78
End: 2018-01-23

## 2018-01-23 ASSESSMENT — ENCOUNTER SYMPTOMS
SHORTNESS OF BREATH: 0
ABDOMINAL PAIN: 0

## 2018-01-23 NOTE — TELEPHONE ENCOUNTER
Pts wife Lizzette Bertrand asking if you will call her back at [phone] to discuss a body scan her  had at [facility].

## 2018-01-26 ENCOUNTER — OFFICE VISIT (OUTPATIENT)
Dept: INTERNAL MEDICINE CLINIC | Age: 78
End: 2018-01-26

## 2018-01-26 VITALS
HEIGHT: 69 IN | SYSTOLIC BLOOD PRESSURE: 136 MMHG | WEIGHT: 206 LBS | BODY MASS INDEX: 30.51 KG/M2 | HEART RATE: 60 BPM | DIASTOLIC BLOOD PRESSURE: 62 MMHG | RESPIRATION RATE: 16 BRPM

## 2018-01-26 DIAGNOSIS — I48.91 ATRIAL FIBRILLATION STATUS POST CARDIOVERSION (HCC): ICD-10-CM

## 2018-01-26 DIAGNOSIS — C18.9 MALIGNANT NEOPLASM OF COLON, UNSPECIFIED PART OF COLON (HCC): Primary | ICD-10-CM

## 2018-01-26 DIAGNOSIS — E78.00 PURE HYPERCHOLESTEROLEMIA: ICD-10-CM

## 2018-01-26 PROCEDURE — G8598 ASA/ANTIPLAT THER USED: HCPCS | Performed by: INTERNAL MEDICINE

## 2018-01-26 PROCEDURE — G8484 FLU IMMUNIZE NO ADMIN: HCPCS | Performed by: INTERNAL MEDICINE

## 2018-01-26 PROCEDURE — G8417 CALC BMI ABV UP PARAM F/U: HCPCS | Performed by: INTERNAL MEDICINE

## 2018-01-26 PROCEDURE — G8428 CUR MEDS NOT DOCUMENT: HCPCS | Performed by: INTERNAL MEDICINE

## 2018-01-26 PROCEDURE — 1123F ACP DISCUSS/DSCN MKR DOCD: CPT | Performed by: INTERNAL MEDICINE

## 2018-01-26 PROCEDURE — 99214 OFFICE O/P EST MOD 30 MIN: CPT | Performed by: INTERNAL MEDICINE

## 2018-01-26 PROCEDURE — 4040F PNEUMOC VAC/ADMIN/RCVD: CPT | Performed by: INTERNAL MEDICINE

## 2018-01-26 PROCEDURE — 4004F PT TOBACCO SCREEN RCVD TLK: CPT | Performed by: INTERNAL MEDICINE

## 2018-02-28 ENCOUNTER — TELEPHONE (OUTPATIENT)
Dept: INTERNAL MEDICINE CLINIC | Age: 78
End: 2018-02-28

## 2018-02-28 NOTE — TELEPHONE ENCOUNTER
Patients spouse calling regarding referral for oncology. Who would Dr. Suzanna Mccain  recommend. Also who would Dr. Suzanna Mccain  recommend for Neurologist since Dr. Odin Chacko is with [facility].     Please advise [phone]

## 2018-03-15 ENCOUNTER — TELEPHONE (OUTPATIENT)
Dept: INTERNAL MEDICINE CLINIC | Age: 78
End: 2018-03-15

## 2018-03-15 NOTE — TELEPHONE ENCOUNTER
Spoke with patient he just wanted to apologize for the way he talked to me the last time I spoke with him.

## 2018-03-28 ENCOUNTER — TELEPHONE (OUTPATIENT)
Dept: INTERNAL MEDICINE CLINIC | Age: 78
End: 2018-03-28

## 2018-04-03 DIAGNOSIS — I10 ESSENTIAL HYPERTENSION: Chronic | ICD-10-CM

## 2018-04-03 RX ORDER — LISINOPRIL 5 MG/1
5 TABLET ORAL DAILY
Qty: 90 TABLET | Refills: 1 | Status: SHIPPED | OUTPATIENT
Start: 2018-04-03 | End: 2018-09-18 | Stop reason: SDUPTHER

## 2018-04-03 RX ORDER — ESCITALOPRAM OXALATE 10 MG/1
TABLET ORAL
Qty: 30 TABLET | Refills: 3 | Status: SHIPPED | OUTPATIENT
Start: 2018-04-03 | End: 2018-04-29 | Stop reason: SDUPTHER

## 2018-04-03 RX ORDER — ROSUVASTATIN CALCIUM 40 MG/1
TABLET, COATED ORAL
Qty: 30 TABLET | Refills: 3 | Status: SHIPPED | OUTPATIENT
Start: 2018-04-03 | End: 2018-08-19 | Stop reason: SDUPTHER

## 2018-04-12 ENCOUNTER — OFFICE VISIT (OUTPATIENT)
Age: 78
End: 2018-04-12

## 2018-04-12 VITALS
BODY MASS INDEX: 28.67 KG/M2 | WEIGHT: 204.8 LBS | SYSTOLIC BLOOD PRESSURE: 120 MMHG | OXYGEN SATURATION: 97 % | HEART RATE: 56 BPM | HEIGHT: 71 IN | DIASTOLIC BLOOD PRESSURE: 80 MMHG

## 2018-04-12 DIAGNOSIS — E78.5 HYPERLIPIDEMIA LDL GOAL <70: ICD-10-CM

## 2018-04-12 DIAGNOSIS — I50.32 CHRONIC DIASTOLIC CHF (CONGESTIVE HEART FAILURE), NYHA CLASS 2 (HCC): Primary | ICD-10-CM

## 2018-04-12 DIAGNOSIS — I25.10 CORONARY ARTERY DISEASE INVOLVING NATIVE CORONARY ARTERY OF NATIVE HEART WITHOUT ANGINA PECTORIS: ICD-10-CM

## 2018-04-12 DIAGNOSIS — I73.9 PAD (PERIPHERAL ARTERY DISEASE) (HCC): ICD-10-CM

## 2018-04-12 DIAGNOSIS — I48.0 PAROXYSMAL ATRIAL FIBRILLATION (HCC): ICD-10-CM

## 2018-04-12 PROCEDURE — 99214 OFFICE O/P EST MOD 30 MIN: CPT | Performed by: INTERNAL MEDICINE

## 2018-04-12 PROCEDURE — 93000 ELECTROCARDIOGRAM COMPLETE: CPT | Performed by: INTERNAL MEDICINE

## 2018-04-12 PROCEDURE — G8427 DOCREV CUR MEDS BY ELIG CLIN: HCPCS | Performed by: INTERNAL MEDICINE

## 2018-04-12 PROCEDURE — 4004F PT TOBACCO SCREEN RCVD TLK: CPT | Performed by: INTERNAL MEDICINE

## 2018-04-12 PROCEDURE — 4040F PNEUMOC VAC/ADMIN/RCVD: CPT | Performed by: INTERNAL MEDICINE

## 2018-04-12 PROCEDURE — G8598 ASA/ANTIPLAT THER USED: HCPCS | Performed by: INTERNAL MEDICINE

## 2018-04-12 PROCEDURE — 1123F ACP DISCUSS/DSCN MKR DOCD: CPT | Performed by: INTERNAL MEDICINE

## 2018-04-12 PROCEDURE — G8417 CALC BMI ABV UP PARAM F/U: HCPCS | Performed by: INTERNAL MEDICINE

## 2018-04-13 PROBLEM — I73.9 PAD (PERIPHERAL ARTERY DISEASE) (HCC): Status: ACTIVE | Noted: 2018-04-13

## 2018-04-30 RX ORDER — ESCITALOPRAM OXALATE 10 MG/1
TABLET ORAL
Qty: 90 TABLET | Refills: 3 | Status: SHIPPED | OUTPATIENT
Start: 2018-04-30 | End: 2020-12-08

## 2018-04-30 RX ORDER — TORSEMIDE 20 MG/1
40 TABLET ORAL DAILY
Qty: 60 TABLET | Refills: 3 | Status: SHIPPED | OUTPATIENT
Start: 2018-04-30 | End: 2018-09-18 | Stop reason: SDUPTHER

## 2018-04-30 RX ORDER — ISOSORBIDE MONONITRATE 30 MG/1
30 TABLET, EXTENDED RELEASE ORAL DAILY
Qty: 90 TABLET | Refills: 3 | Status: SHIPPED | OUTPATIENT
Start: 2018-04-30 | End: 2019-05-09 | Stop reason: SDUPTHER

## 2018-05-29 DIAGNOSIS — I10 ESSENTIAL HYPERTENSION: Chronic | ICD-10-CM

## 2018-05-31 RX ORDER — METOPROLOL SUCCINATE 50 MG/1
TABLET, EXTENDED RELEASE ORAL
Qty: 30 TABLET | Refills: 3 | Status: SHIPPED | OUTPATIENT
Start: 2018-05-31 | End: 2018-09-18 | Stop reason: SDUPTHER

## 2018-06-04 ASSESSMENT — ENCOUNTER SYMPTOMS
ABDOMINAL PAIN: 0
SHORTNESS OF BREATH: 0

## 2018-06-08 ENCOUNTER — OFFICE VISIT (OUTPATIENT)
Dept: INTERNAL MEDICINE CLINIC | Age: 78
End: 2018-06-08

## 2018-06-08 VITALS
OXYGEN SATURATION: 97 % | WEIGHT: 205 LBS | HEART RATE: 51 BPM | TEMPERATURE: 98.3 F | HEIGHT: 71 IN | DIASTOLIC BLOOD PRESSURE: 78 MMHG | BODY MASS INDEX: 28.7 KG/M2 | RESPIRATION RATE: 16 BRPM | SYSTOLIC BLOOD PRESSURE: 130 MMHG

## 2018-06-08 DIAGNOSIS — Z95.1 S/P CABG (CORONARY ARTERY BYPASS GRAFT): Chronic | ICD-10-CM

## 2018-06-08 DIAGNOSIS — I10 ESSENTIAL HYPERTENSION: Chronic | ICD-10-CM

## 2018-06-08 DIAGNOSIS — E78.2 MIXED HYPERLIPIDEMIA: Primary | Chronic | ICD-10-CM

## 2018-06-08 LAB
A/G RATIO: 1.9 (ref 1.1–2.2)
ALBUMIN SERPL-MCNC: 4.1 G/DL (ref 3.4–5)
ALP BLD-CCNC: 83 U/L (ref 40–129)
ALT SERPL-CCNC: 12 U/L (ref 10–40)
ANION GAP SERPL CALCULATED.3IONS-SCNC: 14 MMOL/L (ref 3–16)
AST SERPL-CCNC: 16 U/L (ref 15–37)
BILIRUB SERPL-MCNC: 0.3 MG/DL (ref 0–1)
BUN BLDV-MCNC: 15 MG/DL (ref 7–20)
CALCIUM SERPL-MCNC: 9 MG/DL (ref 8.3–10.6)
CHLORIDE BLD-SCNC: 97 MMOL/L (ref 99–110)
CHOLESTEROL, TOTAL: 160 MG/DL (ref 0–199)
CO2: 26 MMOL/L (ref 21–32)
CREAT SERPL-MCNC: 0.9 MG/DL (ref 0.8–1.3)
GFR AFRICAN AMERICAN: >60
GFR NON-AFRICAN AMERICAN: >60
GLOBULIN: 2.2 G/DL
GLUCOSE BLD-MCNC: 98 MG/DL (ref 70–99)
HDLC SERPL-MCNC: 44 MG/DL (ref 40–60)
LDL CHOLESTEROL CALCULATED: 77 MG/DL
POTASSIUM SERPL-SCNC: 4.9 MMOL/L (ref 3.5–5.1)
SODIUM BLD-SCNC: 137 MMOL/L (ref 136–145)
TOTAL PROTEIN: 6.3 G/DL (ref 6.4–8.2)
TRIGL SERPL-MCNC: 197 MG/DL (ref 0–150)
VLDLC SERPL CALC-MCNC: 39 MG/DL

## 2018-06-08 PROCEDURE — 99214 OFFICE O/P EST MOD 30 MIN: CPT | Performed by: INTERNAL MEDICINE

## 2018-06-08 PROCEDURE — 4040F PNEUMOC VAC/ADMIN/RCVD: CPT | Performed by: INTERNAL MEDICINE

## 2018-06-08 PROCEDURE — G8417 CALC BMI ABV UP PARAM F/U: HCPCS | Performed by: INTERNAL MEDICINE

## 2018-06-08 PROCEDURE — G8598 ASA/ANTIPLAT THER USED: HCPCS | Performed by: INTERNAL MEDICINE

## 2018-06-08 PROCEDURE — 4004F PT TOBACCO SCREEN RCVD TLK: CPT | Performed by: INTERNAL MEDICINE

## 2018-06-08 PROCEDURE — 1123F ACP DISCUSS/DSCN MKR DOCD: CPT | Performed by: INTERNAL MEDICINE

## 2018-06-08 PROCEDURE — G8427 DOCREV CUR MEDS BY ELIG CLIN: HCPCS | Performed by: INTERNAL MEDICINE

## 2018-06-08 RX ORDER — MOMETASONE FUROATE 50 UG/1
2 SPRAY, METERED NASAL 2 TIMES DAILY
Qty: 1 EACH | Refills: 1 | Status: SHIPPED | OUTPATIENT
Start: 2018-06-08 | End: 2018-12-20 | Stop reason: CLARIF

## 2018-06-08 RX ORDER — VARENICLINE TARTRATE 25 MG
KIT ORAL
Qty: 1 EACH | Refills: 0 | Status: SHIPPED | OUTPATIENT
Start: 2018-06-08 | End: 2018-12-20 | Stop reason: SDUPTHER

## 2018-06-08 ASSESSMENT — PATIENT HEALTH QUESTIONNAIRE - PHQ9
1. LITTLE INTEREST OR PLEASURE IN DOING THINGS: 0
SUM OF ALL RESPONSES TO PHQ QUESTIONS 1-9: 0
2. FEELING DOWN, DEPRESSED OR HOPELESS: 0
SUM OF ALL RESPONSES TO PHQ9 QUESTIONS 1 & 2: 0

## 2018-06-13 ENCOUNTER — TELEPHONE (OUTPATIENT)
Dept: PRIMARY CARE CLINIC | Age: 78
End: 2018-06-13

## 2018-06-15 ENCOUNTER — TELEPHONE (OUTPATIENT)
Dept: INTERNAL MEDICINE CLINIC | Age: 78
End: 2018-06-15

## 2018-06-15 ENCOUNTER — TELEPHONE (OUTPATIENT)
Dept: ORTHOPEDIC SURGERY | Age: 78
End: 2018-06-15

## 2018-06-18 ENCOUNTER — OFFICE VISIT (OUTPATIENT)
Dept: ORTHOPEDIC SURGERY | Age: 78
End: 2018-06-18

## 2018-06-18 VITALS
WEIGHT: 205 LBS | BODY MASS INDEX: 29.35 KG/M2 | HEART RATE: 60 BPM | HEIGHT: 70 IN | TEMPERATURE: 98 F | DIASTOLIC BLOOD PRESSURE: 58 MMHG | SYSTOLIC BLOOD PRESSURE: 110 MMHG

## 2018-06-18 DIAGNOSIS — Z96.652 HISTORY OF TOTAL LEFT KNEE REPLACEMENT: Primary | ICD-10-CM

## 2018-06-18 DIAGNOSIS — R29.898 WEAKNESS OF LEFT LEG: ICD-10-CM

## 2018-06-18 DIAGNOSIS — M54.40 ACUTE RIGHT-SIDED LOW BACK PAIN WITH SCIATICA, SCIATICA LATERALITY UNSPECIFIED: ICD-10-CM

## 2018-06-18 PROCEDURE — 4004F PT TOBACCO SCREEN RCVD TLK: CPT | Performed by: ORTHOPAEDIC SURGERY

## 2018-06-18 PROCEDURE — G8417 CALC BMI ABV UP PARAM F/U: HCPCS | Performed by: ORTHOPAEDIC SURGERY

## 2018-06-18 PROCEDURE — 1123F ACP DISCUSS/DSCN MKR DOCD: CPT | Performed by: ORTHOPAEDIC SURGERY

## 2018-06-18 PROCEDURE — G8427 DOCREV CUR MEDS BY ELIG CLIN: HCPCS | Performed by: ORTHOPAEDIC SURGERY

## 2018-06-18 PROCEDURE — 99213 OFFICE O/P EST LOW 20 MIN: CPT | Performed by: ORTHOPAEDIC SURGERY

## 2018-06-18 PROCEDURE — G8598 ASA/ANTIPLAT THER USED: HCPCS | Performed by: ORTHOPAEDIC SURGERY

## 2018-06-18 PROCEDURE — 4040F PNEUMOC VAC/ADMIN/RCVD: CPT | Performed by: ORTHOPAEDIC SURGERY

## 2018-06-22 ENCOUNTER — TELEPHONE (OUTPATIENT)
Dept: INTERNAL MEDICINE CLINIC | Age: 78
End: 2018-06-22

## 2018-07-11 ENCOUNTER — HOSPITAL ENCOUNTER (OUTPATIENT)
Dept: OTHER | Age: 78
Discharge: HOME | End: 2018-07-31
Attending: ORTHOPAEDIC SURGERY | Admitting: ORTHOPAEDIC SURGERY

## 2018-07-11 ASSESSMENT — ACTIVITIES OF DAILY LIVING (ADL): EFFECT OF PAIN ON DAILY ACTIVITIES: LIMITS WB ACTIVITIES

## 2018-07-11 ASSESSMENT — PAIN DESCRIPTION - ONSET: ONSET: ON-GOING

## 2018-07-11 ASSESSMENT — PAIN DESCRIPTION - PROGRESSION: CLINICAL_PROGRESSION: GRADUALLY WORSENING

## 2018-07-11 ASSESSMENT — PAIN SCALES - GENERAL: PAINLEVEL_OUTOF10: 5

## 2018-07-11 ASSESSMENT — PAIN DESCRIPTION - FREQUENCY: FREQUENCY: CONTINUOUS

## 2018-07-11 ASSESSMENT — PAIN DESCRIPTION - ORIENTATION: ORIENTATION: LEFT

## 2018-07-11 ASSESSMENT — PAIN DESCRIPTION - LOCATION: LOCATION: KNEE

## 2018-07-11 ASSESSMENT — PAIN DESCRIPTION - DESCRIPTORS: DESCRIPTORS: ACHING;SORE;CONSTANT

## 2018-07-11 ASSESSMENT — PAIN DESCRIPTION - PAIN TYPE: TYPE: CHRONIC PAIN

## 2018-08-01 ENCOUNTER — HOSPITAL ENCOUNTER (OUTPATIENT)
Dept: OTHER | Age: 78
Discharge: HOME | End: 2018-08-23
Attending: ORTHOPAEDIC SURGERY | Admitting: ORTHOPAEDIC SURGERY

## 2018-08-03 ENCOUNTER — TELEPHONE (OUTPATIENT)
Dept: INTERNAL MEDICINE CLINIC | Age: 78
End: 2018-08-03

## 2018-08-03 NOTE — TELEPHONE ENCOUNTER
pts wife asking to speak with Dr Carlie Benton. She would not give me details and states Dr Sharee Oden know\". She is aware Dr Carlie Benton is out and wants to wait for his return.     cb#cb#[phone]

## 2018-08-06 ENCOUNTER — OFFICE VISIT (OUTPATIENT)
Dept: ORTHOPEDIC SURGERY | Age: 78
End: 2018-08-06

## 2018-08-06 VITALS
BODY MASS INDEX: 28.37 KG/M2 | WEIGHT: 198.2 LBS | HEIGHT: 70 IN | HEART RATE: 53 BPM | DIASTOLIC BLOOD PRESSURE: 62 MMHG | TEMPERATURE: 98.1 F | SYSTOLIC BLOOD PRESSURE: 129 MMHG | RESPIRATION RATE: 16 BRPM

## 2018-08-06 DIAGNOSIS — Z96.652 HISTORY OF TOTAL LEFT KNEE REPLACEMENT: Primary | ICD-10-CM

## 2018-08-06 PROCEDURE — G8417 CALC BMI ABV UP PARAM F/U: HCPCS | Performed by: PHYSICIAN ASSISTANT

## 2018-08-06 PROCEDURE — G8427 DOCREV CUR MEDS BY ELIG CLIN: HCPCS | Performed by: PHYSICIAN ASSISTANT

## 2018-08-06 PROCEDURE — 1101F PT FALLS ASSESS-DOCD LE1/YR: CPT | Performed by: PHYSICIAN ASSISTANT

## 2018-08-06 PROCEDURE — G8598 ASA/ANTIPLAT THER USED: HCPCS | Performed by: PHYSICIAN ASSISTANT

## 2018-08-06 PROCEDURE — 99212 OFFICE O/P EST SF 10 MIN: CPT | Performed by: PHYSICIAN ASSISTANT

## 2018-08-06 PROCEDURE — 1123F ACP DISCUSS/DSCN MKR DOCD: CPT | Performed by: PHYSICIAN ASSISTANT

## 2018-08-06 PROCEDURE — 1036F TOBACCO NON-USER: CPT | Performed by: PHYSICIAN ASSISTANT

## 2018-08-06 PROCEDURE — 4040F PNEUMOC VAC/ADMIN/RCVD: CPT | Performed by: PHYSICIAN ASSISTANT

## 2018-08-07 ENCOUNTER — TELEPHONE (OUTPATIENT)
Dept: INTERNAL MEDICINE CLINIC | Age: 78
End: 2018-08-07

## 2018-08-07 ENCOUNTER — TELEPHONE (OUTPATIENT)
Dept: CARDIOLOGY CLINIC | Age: 78
End: 2018-08-07

## 2018-08-07 NOTE — PROGRESS NOTES
Subjective:      Patient ID: Mehdi Duarte is a 76 y.o.  male. Chief Complaint   Patient presents with    Follow-up     L TKA DOS: 7/1/16        HPI: He is here for follow-up treatment. He is status post left knee arthroplasty. He was seen about 6 weeks ago for continued left knee pain. He began a home exercise program on a bicycle and elliptical machine. His been using Voltaren gel. His left knee pain has since resolved. He is now able to be very active without complaints of pain. Review of Systems:   Negative for fever or chills.       Past Medical History:   Diagnosis Date    Acute diastolic congestive heart failure ([address] 10/28/2016    Anxiety     Atrial fibrillation ([address] 11/28/2014    s/p ablation 2015    CAD (coronary artery disease)     Cancer ([address]     colon    Carotid artery stenosis     Cataracts, bilateral     Diverticulitis of colon     GERD (gastroesophageal reflux disease)     Hyperlipidemia     Hypertension     Liver disease 1959    in the [employer] in [place] [facility] had Hepatitis    Melanoma in situ of back ([address]     Melanoma in situ of upper arm ([address] 2005    left/ excision by Dr Jason Wang    Migraine with aura 3/21/2014    MRSA infection 12/13/2016    Osteoarthritis     Peptic ulcer disease     Seizures ([address]     Skin cancer (melanoma) ([address]     on back     Tobacco abuse 1/19/2017    Unspecified sleep apnea        Family History   Problem Relation Age of Onset    Cancer Father     Cancer Sister     Cancer Brother     Cancer Brother     Cancer Brother        Past Surgical History:   Procedure Laterality Date    CAROTID ENDARTERECTOMY  3-1999    COLON SURGERY  4/15/05    Right colectomy to removal of cecum and terminal ileum    COLONOSCOPY      CORONARY ANGIOPLASTY WITH STENT PLACEMENT      CORONARY ARTERY BYPASS GRAFT  11/14/12    x 2 (LIMA-LAD, SVG-OM1)    EYE SURGERY      FRACTURE SURGERY Left 1959    left arm    KNEE ARTHROSCOPY      LIPOMA RESECTION  2009 mouth every 6 hours as needed      metoprolol succinate (TOPROL XL) 50 MG extended release tablet Take 0.5 tablets by mouth nightly 30 tablet 5    levETIRAcetam (KEPPRA) 750 MG tablet TAKE 1 tab BY MOUTH TWICE A DAY- 90 day supply 180 tablet 3    loratadine (CLARITIN) 10 MG tablet Take 1 tablet by mouth daily 30 tablet 3     No current facility-administered medications for this visit. Objective:   He is alert, oriented x 3, pleasant, well nourished, developed and in no acute distress. /62   Pulse 53   Temp 98.1 °F (36.7 °C) (Temporal)   Resp 16   Ht 5' 10\" (1.778 m)   Wt 198 lb 3.2 oz (89.9 kg)   BMI 28.44 kg/m²      KNEE EXAM:  Examination of the left knee arthroplasty:   The alignment of the knee is neutral.   There is not erythema. There no soft tissue swelling. There is no effusion. ROM-  Extension 0          -   Flexion  125   There no pain associated with ROM testing. There is no crepitus or instability with range of motion testing. Extensor Mechanism is  intact. X Rays: not performed in the office today:   Diagnosis:        ICD-10-CM ICD-9-CM    1. History of total left knee replacement-7. 1.2016 F57.982 V43.65         Assessment/ Plan:      The natural history of the patient's diagnosis as well as the treatment options were discussed in full and questions were answered. Risks and benefits of the treatment options also reviewed in detail. He has a clinically stable left knee arthroplasty. Pain complaints have diminished with routine exercise and topical anti-inflammatory medications. He'll continue current treatment regimen. Follow Up: 6-12 months. Call or return to clinic prn if these symptoms worsen or fail to improve as anticipated.

## 2018-08-23 RX ORDER — ROSUVASTATIN CALCIUM 40 MG/1
TABLET, COATED ORAL
Qty: 30 TABLET | Refills: 1 | Status: SHIPPED | OUTPATIENT
Start: 2018-08-23 | End: 2018-11-07 | Stop reason: SDUPTHER

## 2018-09-18 DIAGNOSIS — I10 ESSENTIAL HYPERTENSION: Chronic | ICD-10-CM

## 2018-09-19 RX ORDER — LISINOPRIL 5 MG/1
5 TABLET ORAL DAILY
Qty: 90 TABLET | Refills: 0 | Status: SHIPPED | OUTPATIENT
Start: 2018-09-19 | End: 2019-03-22 | Stop reason: SDUPTHER

## 2018-09-19 RX ORDER — METOPROLOL SUCCINATE 50 MG/1
TABLET, EXTENDED RELEASE ORAL
Qty: 30 TABLET | Refills: 3 | Status: SHIPPED | OUTPATIENT
Start: 2018-09-19 | End: 2018-10-11 | Stop reason: SINTOL

## 2018-09-19 RX ORDER — TORSEMIDE 20 MG/1
40 TABLET ORAL DAILY
Qty: 60 TABLET | Refills: 3 | Status: SHIPPED | OUTPATIENT
Start: 2018-09-19 | End: 2019-02-05 | Stop reason: SDUPTHER

## 2018-09-28 ENCOUNTER — HOSPITAL ENCOUNTER (OUTPATIENT)
Dept: MRI IMAGING | Age: 78
Discharge: HOME | End: 2018-09-28
Payer: MEDICARE

## 2018-09-28 DIAGNOSIS — R41.3 MEMORY LOSS: ICD-10-CM

## 2018-09-28 PROCEDURE — 70551 MRI BRAIN STEM W/O DYE: CPT

## 2018-10-08 RX ORDER — AMIODARONE HYDROCHLORIDE 200 MG/1
TABLET ORAL
Qty: 60 TABLET | Refills: 5 | Status: SHIPPED | OUTPATIENT
Start: 2018-10-08 | End: 2019-11-02 | Stop reason: SDUPTHER

## 2018-10-11 ENCOUNTER — OFFICE VISIT (OUTPATIENT)
Dept: CARDIOLOGY CLINIC | Age: 78
End: 2018-10-11
Payer: MEDICARE

## 2018-10-11 VITALS
WEIGHT: 205 LBS | DIASTOLIC BLOOD PRESSURE: 60 MMHG | SYSTOLIC BLOOD PRESSURE: 138 MMHG | OXYGEN SATURATION: 98 % | BODY MASS INDEX: 29.35 KG/M2 | HEIGHT: 70 IN | HEART RATE: 48 BPM

## 2018-10-11 DIAGNOSIS — I48.0 PAROXYSMAL ATRIAL FIBRILLATION (HCC): ICD-10-CM

## 2018-10-11 DIAGNOSIS — R41.3 SHORT-TERM MEMORY LOSS: ICD-10-CM

## 2018-10-11 DIAGNOSIS — I10 ESSENTIAL HYPERTENSION: Chronic | ICD-10-CM

## 2018-10-11 DIAGNOSIS — R40.0 SOMNOLENCE: ICD-10-CM

## 2018-10-11 DIAGNOSIS — I50.32 CHRONIC DIASTOLIC CHF (CONGESTIVE HEART FAILURE), NYHA CLASS 2 (HCC): Primary | ICD-10-CM

## 2018-10-11 DIAGNOSIS — I73.9 PAD (PERIPHERAL ARTERY DISEASE) (HCC): ICD-10-CM

## 2018-10-11 DIAGNOSIS — I25.10 CORONARY ARTERY DISEASE INVOLVING NATIVE CORONARY ARTERY OF NATIVE HEART WITHOUT ANGINA PECTORIS: ICD-10-CM

## 2018-10-11 DIAGNOSIS — E78.5 HYPERLIPIDEMIA LDL GOAL <70: ICD-10-CM

## 2018-10-11 PROCEDURE — 99214 OFFICE O/P EST MOD 30 MIN: CPT | Performed by: INTERNAL MEDICINE

## 2018-10-11 PROCEDURE — 1036F TOBACCO NON-USER: CPT | Performed by: INTERNAL MEDICINE

## 2018-10-11 PROCEDURE — 1123F ACP DISCUSS/DSCN MKR DOCD: CPT | Performed by: INTERNAL MEDICINE

## 2018-10-11 PROCEDURE — G8428 CUR MEDS NOT DOCUMENT: HCPCS | Performed by: INTERNAL MEDICINE

## 2018-10-11 PROCEDURE — 1101F PT FALLS ASSESS-DOCD LE1/YR: CPT | Performed by: INTERNAL MEDICINE

## 2018-10-11 PROCEDURE — G8598 ASA/ANTIPLAT THER USED: HCPCS | Performed by: INTERNAL MEDICINE

## 2018-10-11 PROCEDURE — G8484 FLU IMMUNIZE NO ADMIN: HCPCS | Performed by: INTERNAL MEDICINE

## 2018-10-11 PROCEDURE — G8417 CALC BMI ABV UP PARAM F/U: HCPCS | Performed by: INTERNAL MEDICINE

## 2018-10-11 PROCEDURE — 93000 ELECTROCARDIOGRAM COMPLETE: CPT | Performed by: INTERNAL MEDICINE

## 2018-10-11 PROCEDURE — 4040F PNEUMOC VAC/ADMIN/RCVD: CPT | Performed by: INTERNAL MEDICINE

## 2018-10-11 NOTE — PATIENT INSTRUCTIONS
heart disease. Follow-up care is a key part of your treatment and safety. Be sure to make and go to all appointments, and call your doctor if you are having problems. It's also a good idea to know your test results and keep a list of the medicines you take. How can you care for yourself at home? Watch your portions  · Learn what a serving is. A \"serving\" and a \"portion\" are not always the same thing. Make sure that you are not eating larger portions than are recommended. For example, a serving of pasta is ½ cup. A serving size of meat is 2 to 3 ounces. A 3-ounce serving is about the size of a deck of cards. Measure serving sizes until you are good at [place]" them. Keep in mind that restaurants often serve portions that are 2 or 3 times the size of one serving. · To keep your energy level up and keep you from feeling hungry, eat often but in smaller portions. · Eat only the number of calories you need to stay at a healthy weight. If you need to lose weight, eat fewer calories than your body burns (through exercise and other physical activity). Eat more fruits and vegetables  · Eat a variety of fruit and vegetables every day. Dark green, deep orange, red, or yellow fruits and vegetables are especially good for you. Examples include spinach, carrots, peaches, and berries. · Keep carrots, celery, and other veggies handy for snacks. Buy fruit that is in season and store it where you can see it so that you will be tempted to eat it. · Cook dishes that have a lot of veggies in them, such as stir-fries and soups. Limit saturated and trans fat  · Read food labels, and try to avoid saturated and trans fats. They increase your risk of heart disease. Trans fat is found in many processed foods such as cookies and crackers. · Use olive or canola oil when you cook. Try cholesterol-lowering spreads, such as Benecol or Take Control. · Bake, broil, grill, or steam foods instead of frying them.   · Choose lean meats Up Now\" link. Current as of: December 6, 2017  Content Version: 11.7  © 5055-1737 [employer]. Care instructions adapted under license by [facility] ([facility]). If you have questions about a medical condition or this instruction, always ask your healthcare professional. [address] any warranty or liability for your use of this information.

## 2018-10-11 NOTE — PROGRESS NOTES
symmetrical, trachea midline, no adenopathy, thyroid: not enlarged, symmetric, no tenderness/mass/nodules, no carotid bruit or JVD       Lungs:   Clear to auscultation bilaterally, respirations unlabored   Heart:  Regular but [place], normal S1/S2, no M/R/G      Abdomen:   Soft, non-tender, bowel sounds active all four quadrants,  no masses, no organomegaly   Extremities: Extremities normal, atraumatic, no cyanosis or edema   Pulses: Carotid      L   2      R2  Radial       L    2     R2     Skin: Petechial rash on bilateral lower legs above the sock line up to the knee   Pysch: Normal mood and affect   Neurologic: Normal     Pulses: Carotid      L   2     R2  Radial       L   2     R2  Femoral    L   2     R2  Popliteal    L   2     R2  DP            L   1 +doppler      R0 +doppler  PT             L   1 +dopple     R1 +doppler(weak)           Lab Results   Component Value Date    TRIG 197 06/08/2018    HDL 44 06/08/2018    LDLCALC 77 06/08/2018    LABVLDL 39 06/08/2018     Lab Results   Component Value Date     06/08/2018    K 4.9 06/08/2018    BUN 15 06/08/2018    CREATININE 0.9 06/08/2018    No components found for: HGBA1C    Lab Results   Component Value Date    TSH 2.65 03/10/2017     ECG 10/22/15: Sinus Bradycardia  ECG 6/15/16: Sinus Bradycardia, HR 49  ECG 8/10/17: Sinus rhythm  ECG 10/5/17: sinus bradycardia HR 48  ECG 4/12/18: Sinus Bradycardia, QTc 433ms            Echo 8/20/14:  Normal left ventricular size and systolic function. Ejection fraction is visually estimated to be 50%. Normal right ventricular size and function. The left atrium is mildly dilated. There is mild tricuspid regurgitation with RVSP estimated at 32 mmHg. CT head wo contrast:10/10/2015  IMPRESSION:   No acute intracranial abnormality. Carotid Duplex: 10/15/2015  Summary   The right internal carotid artery appears to have a 1-15% diameter reducing   stenosis based on velocity criteria.    The right vertebral artery demonstrates normal antegrade flow. The left internal carotid artery appears to have a 1-15% diameter reducing   stenosis based on velocity criteria. The left vertebral artery demonstrates normal antegrade flow. Echo 10/15/15:  Left ventricle size is normal. Normal left ventricular wall thickness. Ejection fraction is visually estimated to be 55-60 %. No regional wall motion abnormalities are noted. Normal diastolic function. The aortic valve is thickened/calcified with decreased leaflet mobility. The aortic valve area is calculated at 1.4 cm2 with a maximum pressure  gradient of 28 mmHg and a mean pressure gradient of 10 mmHg. This is c/w mild aortic stenosis. Mild aortic regurgitation is present. There is mild tricuspid regurgitation with RVSP estimated at 34 mmHg. The right atrium is mildly dilated. Assessment:  1. Chronic diastolic CHF, class 2  2. Atrial fibrillation, paroxysmal  3. PAD  4. CAD of native coronary arteries without angina-CABG 2012  5. Hyperlipidemia with goal LDL<70mg/dL  6. Short term memory loss    Plan:   Due to the memory issues and mood changes he is experiencing, I will have him discontinue taking Toprol in the event the medication or his bradycardia is contributing. Brianna Sandoval He will continue on amiodarone 200 mg daily to control his rhythm as well as Xarelto for stroke risk reduction. I will place a referral [facility] to be evaluated for a sleep study. I duid recommend he could get a second opinion from [facility] or  Neurology if he wished. I will see him in office for follow up in 2 months. This note was scribed in the presence of Marlen Travis MD by General Lenny RN.

## 2018-10-18 ENCOUNTER — OFFICE VISIT (OUTPATIENT)
Dept: SLEEP MEDICINE | Age: 78
End: 2018-10-18
Payer: MEDICARE

## 2018-10-18 VITALS
RESPIRATION RATE: 18 BRPM | BODY MASS INDEX: 29.26 KG/M2 | HEART RATE: 71 BPM | OXYGEN SATURATION: 95 % | TEMPERATURE: 98.3 F | WEIGHT: 204.4 LBS | HEIGHT: 70 IN | SYSTOLIC BLOOD PRESSURE: 120 MMHG | DIASTOLIC BLOOD PRESSURE: 60 MMHG

## 2018-10-18 DIAGNOSIS — G47.33 OSA (OBSTRUCTIVE SLEEP APNEA): Primary | ICD-10-CM

## 2018-10-18 DIAGNOSIS — Z86.73 HISTORY OF TRANSIENT ISCHEMIC ATTACK (TIA): ICD-10-CM

## 2018-10-18 DIAGNOSIS — I48.91 ATRIAL FIBRILLATION STATUS POST CARDIOVERSION (HCC): ICD-10-CM

## 2018-10-18 DIAGNOSIS — I25.10 CORONARY ARTERY DISEASE INVOLVING NATIVE CORONARY ARTERY OF NATIVE HEART WITHOUT ANGINA PECTORIS: Chronic | ICD-10-CM

## 2018-10-18 PROCEDURE — G8484 FLU IMMUNIZE NO ADMIN: HCPCS | Performed by: PSYCHIATRY & NEUROLOGY

## 2018-10-18 PROCEDURE — 99214 OFFICE O/P EST MOD 30 MIN: CPT | Performed by: PSYCHIATRY & NEUROLOGY

## 2018-10-18 PROCEDURE — 4040F PNEUMOC VAC/ADMIN/RCVD: CPT | Performed by: PSYCHIATRY & NEUROLOGY

## 2018-10-18 PROCEDURE — 1101F PT FALLS ASSESS-DOCD LE1/YR: CPT | Performed by: PSYCHIATRY & NEUROLOGY

## 2018-10-18 PROCEDURE — G8427 DOCREV CUR MEDS BY ELIG CLIN: HCPCS | Performed by: PSYCHIATRY & NEUROLOGY

## 2018-10-18 PROCEDURE — 1123F ACP DISCUSS/DSCN MKR DOCD: CPT | Performed by: PSYCHIATRY & NEUROLOGY

## 2018-10-18 PROCEDURE — 4004F PT TOBACCO SCREEN RCVD TLK: CPT | Performed by: PSYCHIATRY & NEUROLOGY

## 2018-10-18 PROCEDURE — G8417 CALC BMI ABV UP PARAM F/U: HCPCS | Performed by: PSYCHIATRY & NEUROLOGY

## 2018-10-18 PROCEDURE — G8598 ASA/ANTIPLAT THER USED: HCPCS | Performed by: PSYCHIATRY & NEUROLOGY

## 2018-10-18 ASSESSMENT — SLEEP AND FATIGUE QUESTIONNAIRES
HOW LIKELY ARE YOU TO NOD OFF OR FALL ASLEEP WHILE WATCHING TV: 2
HOW LIKELY ARE YOU TO NOD OFF OR FALL ASLEEP WHILE SITTING AND TALKING TO SOMEONE: 0
NECK CIRCUMFERENCE (INCHES): 16.75
HOW LIKELY ARE YOU TO NOD OFF OR FALL ASLEEP WHILE SITTING AND READING: 0
HOW LIKELY ARE YOU TO NOD OFF OR FALL ASLEEP WHILE LYING DOWN TO REST IN THE AFTERNOON WHEN CIRCUMSTANCES PERMIT: 0
HOW LIKELY ARE YOU TO NOD OFF OR FALL ASLEEP WHILE SITTING INACTIVE IN A PUBLIC PLACE: 0
ESS TOTAL SCORE: 2
HOW LIKELY ARE YOU TO NOD OFF OR FALL ASLEEP WHEN YOU ARE A PASSENGER IN A CAR FOR AN HOUR WITHOUT A BREAK: 0
HOW LIKELY ARE YOU TO NOD OFF OR FALL ASLEEP IN A CAR, WHILE STOPPED FOR A FEW MINUTES IN TRAFFIC: 0
HOW LIKELY ARE YOU TO NOD OFF OR FALL ASLEEP WHILE SITTING QUIETLY AFTER LUNCH WITHOUT ALCOHOL: 0

## 2018-10-18 ASSESSMENT — ENCOUNTER SYMPTOMS
EYES NEGATIVE: 1
ALLERGIC/IMMUNOLOGIC NEGATIVE: 1
WHEEZING: 1
SHORTNESS OF BREATH: 1
GASTROINTESTINAL NEGATIVE: 1

## 2018-10-18 NOTE — PROGRESS NOTES
TABS tablet TAKE 1 TABLET BY MOUTH DAILY WITH SUPPER 12/27/17  Yes Tadeo Devries MD   magnesium (MAGNESIUM-OXIDE) 250 MG TABS tablet Take 250 mg by mouth daily   Yes Historical Provider, MD   acetaminophen (TYLENOL) 500 MG tablet Take 500 mg by mouth every 6 hours as needed   Yes Historical Provider, MD   levETIRAcetam (KEPPRA) 750 MG tablet TAKE 1 tab BY MOUTH TWICE A DAY- 90 day supply 9/8/17  Yes Edin Castellano MD   loratadine (CLARITIN) 10 MG tablet Take 1 tablet by mouth daily 3/3/17  Yes Mirella Alejandra MD       Allergies as of 10/18/2018    (No Known Allergies)       Patient Active Problem List   Diagnosis    Sleep apnea    Special screening for malignant neoplasm of prostate    GERD (gastroesophageal reflux disease)    Malaise and fatigue    Melanoma of skin ([address]    Malignant neoplasm of colon ([address]    Glucose intolerance (malabsorption)    Pure hypercholesterolemia    Coronary artery disease involving native coronary artery of native heart without angina pectoris    Peptic ulcer disease    Carotid artery stenosis    Hyperlipidemia    NSTEMI (non-ST elevated myocardial infarction) ([address]    Coronary artery disease without angina pectoris    Essential hypertension    EDSON (obstructive sleep apnea)    Occasional cigarette smoker    TIA (transient ischemic attack)    Episodic altered awareness    Anxiety    Hyponatremia    Seizure disorder ([address]    Bradycardia    Migraine with aura    S/P CABG (coronary artery bypass graft)    Paroxysmal atrial fibrillation (HCC)    Atrial fibrillation (HCC)    Bronchitis    Atrial flutter (HCC)    Tachycardia    Recurrent falls    Ataxia    Cognitive changes    Hip pain    Arm pain    Pain in joint, shoulder region    Complete rotator cuff tear of left shoulder    Biceps muscle tear    Contusion, hip    Rotator cuff (capsule) sprain    Primary localized osteoarthrosis of shoulder region    Partial symptomatic epilepsy terminal ileum    COLONOSCOPY      CORONARY ANGIOPLASTY WITH STENT PLACEMENT      CORONARY ARTERY BYPASS GRAFT  11/14/12    x 2 (LIMA-LAD, SVG-OM1)    EYE SURGERY      FRACTURE SURGERY Left 1959    left arm    KNEE ARTHROSCOPY      LIPOMA RESECTION  2009    Removal lipoma left posterior neck by Dr Emma Osman  9/13/05    Excision left arm primary melamoma and sentinel lymph node bs of left axilla by Dr Abena Gonzalez Left 07/01/2016    Left Total Knee Replacement    UPPER GASTROINTESTINAL ENDOSCOPY         Family History   Problem Relation Age of Onset    Cancer Father     Cancer Sister     Cancer Brother     Cancer Brother     Cancer Brother        Review of Systems   Constitutional: Positive for diaphoresis and fatigue. HENT: Positive for congestion. Eyes: Negative. Respiratory: Positive for shortness of breath and wheezing. Cardiovascular: Negative. Negative for leg swelling. Gastrointestinal: Negative. Endocrine: Negative. Genitourinary: Positive for frequency (nighttime). Musculoskeletal: Positive for arthralgias and myalgias. Skin: Negative. Allergic/Immunologic: Negative. Neurological: Positive for headaches (AM). Hematological: Bruises/bleeds easily. Psychiatric/Behavioral: Positive for agitation, decreased concentration and dysphoric mood. The patient is nervous/anxious. All other systems reviewed and are negative. Objective:     Vitals:  Weight BMI Neck circumference    Wt Readings from Last 3 Encounters:   10/18/18 204 lb 6.4 oz (92.7 kg)   10/11/18 205 lb (93 kg)   08/06/18 198 lb 3.2 oz (89.9 kg)    Body mass index is 29.33 kg/m².  Neck circumference: 16.75     BP HR SaO2   BP Readings from Last 3 Encounters:   10/18/18 120/60   10/11/18 138/60   08/06/18 129/62    Pulse Readings from Last 3 Encounters:   10/18/18 71 driving and other potentially hazardous circumstances if the patient is experiencing excessive sleepiness. Treatment considerations include the use of nasal CPAP, oral dental appliance or a surgical intervention, which should be based on otolarygologic findings, In the meantime, the patient should be cautioned to avoid the use of alcohol or other depressant medications because of potential for increasing the duration and severity of apnea and cautioned regarding driving or operating and dangerous equipment if the patient is experiencing daytime sleepiness. .    . Orders Placed This Encounter   Procedures    Baseline Diagnostic Sleep Study    Sleep Study with PAP Titration       Return in about 3 months (around 1/18/2019) for to review the PSG and CPAP usage, Reveiwing CPAP usage and compliance report and tro.     Milla Langley MD  Medical Director - [facility]

## 2018-10-18 NOTE — PATIENT INSTRUCTIONS
problems, such as a stuffy nose, caused by a cold or allergies. · Try a continuous positive airway pressure (CPAP) breathing machine if your doctor recommends it. The machine keeps your airway open when you sleep. · If CPAP does not work for you, ask your doctor if you can try other breathing machines. A bilevel positive airway pressure machine uses one type of air pressure for breathing in and another type for breathing out. Another device raises or lowers air pressure as needed while you breathe. · Talk to your doctor if:  ¨ Your nose feels dry or bleeds when you use one of these machines. You may need to increase moisture in the air. A humidifier may help. ¨ Your nose is runny or stuffy from using a breathing machine. Decongestants or a corticosteroid nasal spray may help. ¨ You are sleepy during the day and it gets in the way of the normal things you do. Do not drive when you are drowsy. When should you call for help? Watch closely for changes in your health, and be sure to contact your doctor if:    · You still have sleep apnea even though you have made lifestyle changes.     · You are thinking of trying a device such as CPAP.     · You are having problems using a CPAP or similar machine. Where can you learn more? Go to https://HiWired."[employer]". org and sign in to your [employer]ia account. Enter O249 in the Realtime Worlds box to learn more about \"Sleep Apnea: Care Instructions. \"     If you do not have an account, please click on the \"Sign Up Now\" link. Current as of: December 6, 2017  Content Version: 11.7  © 8523-5977 [employer]. Care instructions adapted under license by [facility] ([facility]). If you have questions about a medical condition or this instruction, always ask your healthcare professional. [place] [address] any warranty or liability for your use of this information.

## 2018-10-30 ENCOUNTER — HOSPITAL ENCOUNTER (EMERGENCY)
Age: 78
Discharge: HOME | End: 2018-10-30
Attending: EMERGENCY MEDICINE
Payer: MEDICARE

## 2018-10-30 ENCOUNTER — APPOINTMENT (OUTPATIENT)
Dept: GENERAL RADIOLOGY | Age: 78
End: 2018-10-30
Payer: MEDICARE

## 2018-10-30 ENCOUNTER — APPOINTMENT (OUTPATIENT)
Dept: CT IMAGING | Age: 78
End: 2018-10-30
Payer: MEDICARE

## 2018-10-30 ENCOUNTER — HOSPITAL ENCOUNTER (OUTPATIENT)
Dept: SLEEP CENTER | Age: 78
Discharge: HOME | End: 2018-10-30
Payer: MEDICARE

## 2018-10-30 VITALS
OXYGEN SATURATION: 99 % | DIASTOLIC BLOOD PRESSURE: 60 MMHG | HEIGHT: 70 IN | BODY MASS INDEX: 28.78 KG/M2 | SYSTOLIC BLOOD PRESSURE: 130 MMHG | HEART RATE: 58 BPM | TEMPERATURE: 97.7 F | RESPIRATION RATE: 15 BRPM | WEIGHT: 201.06 LBS

## 2018-10-30 DIAGNOSIS — G47.33 OSA (OBSTRUCTIVE SLEEP APNEA): ICD-10-CM

## 2018-10-30 DIAGNOSIS — S06.0X0A CONCUSSION WITHOUT LOSS OF CONSCIOUSNESS, INITIAL ENCOUNTER: Primary | ICD-10-CM

## 2018-10-30 DIAGNOSIS — I48.91 ATRIAL FIBRILLATION STATUS POST CARDIOVERSION (HCC): ICD-10-CM

## 2018-10-30 DIAGNOSIS — E03.9 HYPOTHYROIDISM, UNSPECIFIED TYPE: ICD-10-CM

## 2018-10-30 DIAGNOSIS — Z86.73 HISTORY OF TRANSIENT ISCHEMIC ATTACK (TIA): ICD-10-CM

## 2018-10-30 DIAGNOSIS — I25.10 CORONARY ARTERY DISEASE INVOLVING NATIVE CORONARY ARTERY OF NATIVE HEART WITHOUT ANGINA PECTORIS: Chronic | ICD-10-CM

## 2018-10-30 LAB
A/G RATIO: 2 (ref 1.1–2.2)
ALBUMIN SERPL-MCNC: 4.5 G/DL (ref 3.4–5)
ALP BLD-CCNC: 70 U/L (ref 40–129)
ALT SERPL-CCNC: 14 U/L (ref 10–40)
ANION GAP SERPL CALCULATED.3IONS-SCNC: 13 MMOL/L (ref 3–16)
AST SERPL-CCNC: 17 U/L (ref 15–37)
BILIRUB SERPL-MCNC: <0.2 MG/DL (ref 0–1)
BILIRUBIN URINE: NEGATIVE
BLOOD, URINE: NEGATIVE
BUN BLDV-MCNC: 23 MG/DL (ref 7–20)
CALCIUM SERPL-MCNC: 9.3 MG/DL (ref 8.3–10.6)
CHLORIDE BLD-SCNC: 102 MMOL/L (ref 99–110)
CLARITY: CLEAR
CO2: 26 MMOL/L (ref 21–32)
COLOR: YELLOW
CREAT SERPL-MCNC: 1.2 MG/DL (ref 0.8–1.3)
GFR AFRICAN AMERICAN: >60
GFR NON-AFRICAN AMERICAN: 58
GLOBULIN: 2.3 G/DL
GLUCOSE BLD-MCNC: 100 MG/DL (ref 70–99)
GLUCOSE BLD-MCNC: 102 MG/DL (ref 70–99)
GLUCOSE URINE: NEGATIVE MG/DL
KETONES, URINE: NEGATIVE MG/DL
LEUKOCYTE ESTERASE, URINE: NEGATIVE
MICROSCOPIC EXAMINATION: NORMAL
NITRITE, URINE: NEGATIVE
PERFORMED ON: ABNORMAL
PH UA: 5
POTASSIUM REFLEX MAGNESIUM: 4.6 MMOL/L (ref 3.5–5.1)
PROTEIN UA: NEGATIVE MG/DL
SODIUM BLD-SCNC: 141 MMOL/L (ref 136–145)
SPECIFIC GRAVITY UA: 1.02
TOTAL PROTEIN: 6.8 G/DL (ref 6.4–8.2)
TROPONIN: <0.01 NG/ML
TSH SERPL DL<=0.05 MIU/L-ACNC: 5.81 UIU/ML (ref 0.27–4.2)
URINE TYPE: NORMAL
UROBILINOGEN, URINE: 0.2 E.U./DL

## 2018-10-30 PROCEDURE — 84443 ASSAY THYROID STIM HORMONE: CPT

## 2018-10-30 PROCEDURE — 93005 ELECTROCARDIOGRAM TRACING: CPT | Performed by: EMERGENCY MEDICINE

## 2018-10-30 PROCEDURE — 73030 X-RAY EXAM OF SHOULDER: CPT

## 2018-10-30 PROCEDURE — 70450 CT HEAD/BRAIN W/O DYE: CPT

## 2018-10-30 PROCEDURE — 73610 X-RAY EXAM OF ANKLE: CPT

## 2018-10-30 PROCEDURE — 84484 ASSAY OF TROPONIN QUANT: CPT

## 2018-10-30 PROCEDURE — 73560 X-RAY EXAM OF KNEE 1 OR 2: CPT

## 2018-10-30 PROCEDURE — 95811 POLYSOM 6/>YRS CPAP 4/> PARM: CPT

## 2018-10-30 PROCEDURE — 80053 COMPREHEN METABOLIC PANEL: CPT

## 2018-10-30 PROCEDURE — 81003 URINALYSIS AUTO W/O SCOPE: CPT

## 2018-10-30 PROCEDURE — 99284 EMERGENCY DEPT VISIT MOD MDM: CPT

## 2018-10-30 ASSESSMENT — PAIN DESCRIPTION - DESCRIPTORS: DESCRIPTORS: SORE

## 2018-10-30 ASSESSMENT — ENCOUNTER SYMPTOMS
ROS SKIN COMMENTS: BRUISING
EYES NEGATIVE: 1
RESPIRATORY NEGATIVE: 1
GASTROINTESTINAL NEGATIVE: 1

## 2018-10-30 ASSESSMENT — PAIN DESCRIPTION - LOCATION: LOCATION: CHEST

## 2018-10-30 ASSESSMENT — PAIN DESCRIPTION - PAIN TYPE: TYPE: ACUTE PAIN

## 2018-10-31 PROBLEM — E03.4 HYPOTHYROIDISM DUE TO ACQUIRED ATROPHY OF THYROID: Status: ACTIVE | Noted: 2018-10-31

## 2018-10-31 PROBLEM — F07.81 POST CONCUSSIVE SYNDROME: Status: ACTIVE | Noted: 2018-10-31

## 2018-10-31 PROCEDURE — 93010 ELECTROCARDIOGRAM REPORT: CPT | Performed by: INTERNAL MEDICINE

## 2018-11-01 PROCEDURE — 95811 POLYSOM 6/>YRS CPAP 4/> PARM: CPT | Performed by: PSYCHIATRY & NEUROLOGY

## 2018-11-02 ENCOUNTER — TELEPHONE (OUTPATIENT)
Dept: SLEEP MEDICINE | Age: 78
End: 2018-11-02

## 2018-11-02 LAB
EKG ATRIAL RATE: 61 BPM
EKG DIAGNOSIS: NORMAL
EKG P AXIS: 91 DEGREES
EKG P-R INTERVAL: 206 MS
EKG Q-T INTERVAL: 434 MS
EKG QRS DURATION: 94 MS
EKG QTC CALCULATION (BAZETT): 436 MS
EKG R AXIS: -13 DEGREES
EKG T AXIS: 11 DEGREES
EKG VENTRICULAR RATE: 61 BPM

## 2018-11-08 RX ORDER — ROSUVASTATIN CALCIUM 40 MG/1
TABLET, COATED ORAL
Qty: 30 TABLET | Refills: 2 | Status: SHIPPED | OUTPATIENT
Start: 2018-11-08 | End: 2019-02-05 | Stop reason: SDUPTHER

## 2018-11-12 ENCOUNTER — TELEPHONE (OUTPATIENT)
Dept: PULMONOLOGY | Age: 78
End: 2018-11-12

## 2018-11-26 ENCOUNTER — HOSPITAL ENCOUNTER (OUTPATIENT)
Dept: MRI IMAGING | Age: 78
Discharge: HOME | End: 2018-11-26
Payer: MEDICARE

## 2018-11-26 DIAGNOSIS — R26.0 ATAXIA INVOLVING LEGS: ICD-10-CM

## 2018-11-26 DIAGNOSIS — I63.9 CEREBROVASCULAR ACCIDENT (CVA), UNSPECIFIED MECHANISM (HCC): ICD-10-CM

## 2018-11-26 DIAGNOSIS — G31.84 AMNESTIC MCI (MILD COGNITIVE IMPAIRMENT WITH MEMORY LOSS): ICD-10-CM

## 2018-11-26 DIAGNOSIS — G62.9 PERIPHERAL NERVE DISORDER: ICD-10-CM

## 2018-11-26 PROCEDURE — 70549 MR ANGIOGRAPH NECK W/O&W/DYE: CPT

## 2018-11-26 PROCEDURE — 70544 MR ANGIOGRAPHY HEAD W/O DYE: CPT

## 2018-11-26 PROCEDURE — 6360000004 HC RX CONTRAST MEDICATION: Performed by: PSYCHIATRY & NEUROLOGY

## 2018-11-26 PROCEDURE — A9577 INJ MULTIHANCE: HCPCS | Performed by: PSYCHIATRY & NEUROLOGY

## 2018-11-26 RX ADMIN — GADOBENATE DIMEGLUMINE 15 ML: 529 INJECTION, SOLUTION INTRAVENOUS at 16:39

## 2018-12-20 ENCOUNTER — OFFICE VISIT (OUTPATIENT)
Dept: CARDIOLOGY CLINIC | Age: 78
End: 2018-12-20
Payer: MEDICARE

## 2018-12-20 VITALS
DIASTOLIC BLOOD PRESSURE: 78 MMHG | HEART RATE: 60 BPM | HEIGHT: 70 IN | SYSTOLIC BLOOD PRESSURE: 130 MMHG | OXYGEN SATURATION: 97 % | WEIGHT: 210 LBS | BODY MASS INDEX: 30.06 KG/M2

## 2018-12-20 DIAGNOSIS — I10 ESSENTIAL HYPERTENSION: Chronic | ICD-10-CM

## 2018-12-20 DIAGNOSIS — I50.32 CHRONIC DIASTOLIC CHF (CONGESTIVE HEART FAILURE), NYHA CLASS 2 (HCC): Primary | ICD-10-CM

## 2018-12-20 DIAGNOSIS — I48.0 PAROXYSMAL ATRIAL FIBRILLATION (HCC): ICD-10-CM

## 2018-12-20 DIAGNOSIS — I25.10 CORONARY ARTERY DISEASE INVOLVING NATIVE CORONARY ARTERY OF NATIVE HEART WITHOUT ANGINA PECTORIS: Chronic | ICD-10-CM

## 2018-12-20 DIAGNOSIS — I73.9 PAD (PERIPHERAL ARTERY DISEASE) (HCC): ICD-10-CM

## 2018-12-20 DIAGNOSIS — E78.5 HYPERLIPIDEMIA LDL GOAL <70: ICD-10-CM

## 2018-12-20 PROCEDURE — 4040F PNEUMOC VAC/ADMIN/RCVD: CPT | Performed by: INTERNAL MEDICINE

## 2018-12-20 PROCEDURE — 1123F ACP DISCUSS/DSCN MKR DOCD: CPT | Performed by: INTERNAL MEDICINE

## 2018-12-20 PROCEDURE — G8427 DOCREV CUR MEDS BY ELIG CLIN: HCPCS | Performed by: INTERNAL MEDICINE

## 2018-12-20 PROCEDURE — 99214 OFFICE O/P EST MOD 30 MIN: CPT | Performed by: INTERNAL MEDICINE

## 2018-12-20 PROCEDURE — G8482 FLU IMMUNIZE ORDER/ADMIN: HCPCS | Performed by: INTERNAL MEDICINE

## 2018-12-20 PROCEDURE — 4004F PT TOBACCO SCREEN RCVD TLK: CPT | Performed by: INTERNAL MEDICINE

## 2018-12-20 PROCEDURE — 93000 ELECTROCARDIOGRAM COMPLETE: CPT | Performed by: INTERNAL MEDICINE

## 2018-12-20 PROCEDURE — G8417 CALC BMI ABV UP PARAM F/U: HCPCS | Performed by: INTERNAL MEDICINE

## 2018-12-20 PROCEDURE — G8598 ASA/ANTIPLAT THER USED: HCPCS | Performed by: INTERNAL MEDICINE

## 2018-12-20 PROCEDURE — 1101F PT FALLS ASSESS-DOCD LE1/YR: CPT | Performed by: INTERNAL MEDICINE

## 2018-12-20 RX ORDER — VARENICLINE TARTRATE 25 MG
KIT ORAL
Qty: 1 EACH | Refills: 0 | Status: SHIPPED | OUTPATIENT
Start: 2018-12-20 | End: 2021-04-02 | Stop reason: CLARIF

## 2018-12-20 NOTE — PROGRESS NOTES
[address] - Cardiology        Marivel Morrissey is a [de-identified] y.o. patient with a past medical history significant for atrial fibrillation, seizure disorder and CAD with prior PCI and then CABG X 2 vessels in November of 2012. He has atrial fibrillation and underwent an ablation by my partner Dr. Presley Morfin. Unfortunately he had a recurrence of this and was placed on amiodarone and cardioverted. He was readmited to [facility] on 10/28/16-11/1/16 with some volume overload and was in a-fib again. He returns to the office today in follow-up. Since we last saw Adolph COVINGTON (Shailesh Reid) he reports feeling better. He did undergo a sleep study and was diagnosed with sleep apnea. He has been feeling much better with therapy. He has plans to quit smoking. He smokes 1/2 pack per day currently. He has been tolerating his medications and reports compliance. He did seek a second opinion from a neurology perspective and was seen by Dr. Rg English. Denies any PND or orthopnea. Denies any cough productive or otherwise. No recent change in weight. No recent changes in bowel or bladder habits. No easy bleeding or bruising. Denies any arthralgias or myalgias. Review of systems is negative to a 12 point review except as noted above.     Current Outpatient Prescriptions   Medication Sig Dispense Refill    rosuvastatin (CRESTOR) 40 MG tablet TAKE 1 TABLET BY MOUTH DAILY 30 tablet 2    levothyroxine (SYNTHROID) 25 MCG tablet Take 1 tablet by mouth daily 30 tablet 5    amiodarone (CORDARONE) 200 MG tablet TAKE 1 TABLET BY MOUTH DAILY 60 tablet 5    torsemide (DEMADEX) 20 MG tablet TAKE 2 TABLETS BY MOUTH DAILY 60 tablet 3    lisinopril (PRINIVIL;ZESTRIL) 5 MG tablet TAKE 1 TABLET BY MOUTH DAILY 90 tablet 0    escitalopram (LEXAPRO) 10 MG tablet TAKE 1 TABLET BY MOUTH DAILY 90 tablet 3    isosorbide mononitrate (IMDUR) 30 MG extended release tablet TAKE 1 TABLET BY MOUTH DAILY 90 tablet 3    XARELTO 20 MG TABS

## 2019-01-07 RX ORDER — RIVAROXABAN 20 MG/1
20 TABLET, FILM COATED ORAL
Qty: 90 TABLET | Refills: 3 | Status: SHIPPED | OUTPATIENT
Start: 2019-01-07 | End: 2019-12-04 | Stop reason: SDUPTHER

## 2019-02-05 ENCOUNTER — APPOINTMENT (OUTPATIENT)
Dept: CT IMAGING | Age: 79
End: 2019-02-05
Payer: MEDICARE

## 2019-02-05 ENCOUNTER — APPOINTMENT (OUTPATIENT)
Dept: GENERAL RADIOLOGY | Age: 79
End: 2019-02-05
Payer: MEDICARE

## 2019-02-05 ENCOUNTER — HOSPITAL ENCOUNTER (EMERGENCY)
Age: 79
Discharge: HOME | End: 2019-02-05
Attending: EMERGENCY MEDICINE
Payer: MEDICARE

## 2019-02-05 VITALS
OXYGEN SATURATION: 96 % | RESPIRATION RATE: 18 BRPM | HEART RATE: 56 BPM | TEMPERATURE: 97.1 F | BODY MASS INDEX: 29.01 KG/M2 | DIASTOLIC BLOOD PRESSURE: 62 MMHG | SYSTOLIC BLOOD PRESSURE: 110 MMHG | WEIGHT: 202.16 LBS

## 2019-02-05 DIAGNOSIS — J06.9 VIRAL URI WITH COUGH: Primary | ICD-10-CM

## 2019-02-05 DIAGNOSIS — J32.0 CHRONIC MAXILLARY SINUSITIS: ICD-10-CM

## 2019-02-05 LAB
A/G RATIO: 1.7 (ref 1.1–2.2)
ALBUMIN SERPL-MCNC: 4.5 G/DL (ref 3.4–5)
ALP BLD-CCNC: 70 U/L (ref 40–129)
ALT SERPL-CCNC: 24 U/L (ref 10–40)
ANION GAP SERPL CALCULATED.3IONS-SCNC: 13 MMOL/L (ref 3–16)
AST SERPL-CCNC: 20 U/L (ref 15–37)
BASOPHILS ABSOLUTE: 0 K/UL (ref 0–0.2)
BASOPHILS RELATIVE PERCENT: 0.2 %
BILIRUB SERPL-MCNC: 0.3 MG/DL (ref 0–1)
BUN BLDV-MCNC: 25 MG/DL (ref 7–20)
CALCIUM SERPL-MCNC: 9.7 MG/DL (ref 8.3–10.6)
CHLORIDE BLD-SCNC: 96 MMOL/L (ref 99–110)
CO2: 29 MMOL/L (ref 21–32)
CREAT SERPL-MCNC: 1.1 MG/DL (ref 0.8–1.3)
EKG ATRIAL RATE: 49 BPM
EKG DIAGNOSIS: NORMAL
EKG P AXIS: 117 DEGREES
EKG P-R INTERVAL: 194 MS
EKG Q-T INTERVAL: 490 MS
EKG QRS DURATION: 94 MS
EKG QTC CALCULATION (BAZETT): 442 MS
EKG R AXIS: 6 DEGREES
EKG T AXIS: 0 DEGREES
EKG VENTRICULAR RATE: 49 BPM
EOSINOPHILS ABSOLUTE: 0 K/UL (ref 0–0.6)
EOSINOPHILS RELATIVE PERCENT: 0.1 %
GFR AFRICAN AMERICAN: >60
GFR NON-AFRICAN AMERICAN: >60
GLOBULIN: 2.6 G/DL
GLUCOSE BLD-MCNC: 93 MG/DL (ref 70–99)
HCT VFR BLD CALC: 42.2 % (ref 40.5–52.5)
HEMOGLOBIN: 13.8 G/DL (ref 13.5–17.5)
LYMPHOCYTES ABSOLUTE: 1.7 K/UL (ref 1–5.1)
LYMPHOCYTES RELATIVE PERCENT: 16.2 %
MCH RBC QN AUTO: 30.9 PG (ref 26–34)
MCHC RBC AUTO-ENTMCNC: 32.6 G/DL (ref 31–36)
MCV RBC AUTO: 94.8 FL (ref 80–100)
MONOCYTES ABSOLUTE: 0.9 K/UL (ref 0–1.3)
MONOCYTES RELATIVE PERCENT: 8.5 %
NEUTROPHILS ABSOLUTE: 7.8 K/UL (ref 1.7–7.7)
NEUTROPHILS RELATIVE PERCENT: 75 %
PDW BLD-RTO: 14.7 % (ref 12.4–15.4)
PLATELET # BLD: 238 K/UL (ref 135–450)
PMV BLD AUTO: 7.6 FL (ref 5–10.5)
POTASSIUM SERPL-SCNC: 4.6 MMOL/L (ref 3.5–5.1)
RBC # BLD: 4.45 M/UL (ref 4.2–5.9)
SODIUM BLD-SCNC: 138 MMOL/L (ref 136–145)
TOTAL PROTEIN: 7.1 G/DL (ref 6.4–8.2)
WBC # BLD: 10.4 K/UL (ref 4–11)

## 2019-02-05 PROCEDURE — 94640 AIRWAY INHALATION TREATMENT: CPT

## 2019-02-05 PROCEDURE — 70486 CT MAXILLOFACIAL W/O DYE: CPT

## 2019-02-05 PROCEDURE — 80053 COMPREHEN METABOLIC PANEL: CPT

## 2019-02-05 PROCEDURE — 93005 ELECTROCARDIOGRAM TRACING: CPT | Performed by: EMERGENCY MEDICINE

## 2019-02-05 PROCEDURE — 99284 EMERGENCY DEPT VISIT MOD MDM: CPT

## 2019-02-05 PROCEDURE — 93010 ELECTROCARDIOGRAM REPORT: CPT | Performed by: INTERNAL MEDICINE

## 2019-02-05 PROCEDURE — 85025 COMPLETE CBC W/AUTO DIFF WBC: CPT

## 2019-02-05 PROCEDURE — 71046 X-RAY EXAM CHEST 2 VIEWS: CPT

## 2019-02-05 PROCEDURE — 94664 DEMO&/EVAL PT USE INHALER: CPT

## 2019-02-05 PROCEDURE — 6370000000 HC RX 637 (ALT 250 FOR IP): Performed by: EMERGENCY MEDICINE

## 2019-02-05 RX ORDER — IPRATROPIUM BROMIDE AND ALBUTEROL SULFATE 2.5; .5 MG/3ML; MG/3ML
1 SOLUTION RESPIRATORY (INHALATION) ONCE
Status: COMPLETED | OUTPATIENT
Start: 2019-02-05 | End: 2019-02-05

## 2019-02-05 RX ORDER — ALBUTEROL SULFATE 90 UG/1
2 AEROSOL, METERED RESPIRATORY (INHALATION) EVERY 6 HOURS PRN
Qty: 1 INHALER | Refills: 3 | Status: SHIPPED | OUTPATIENT
Start: 2019-02-05 | End: 2021-03-18 | Stop reason: SDUPTHER

## 2019-02-05 RX ORDER — METHYLPREDNISOLONE 4 MG/1
TABLET ORAL
Qty: 1 KIT | Refills: 0 | Status: SHIPPED | OUTPATIENT
Start: 2019-02-05 | End: 2019-02-11

## 2019-02-05 RX ADMIN — IPRATROPIUM BROMIDE AND ALBUTEROL SULFATE 1 AMPULE: .5; 3 SOLUTION RESPIRATORY (INHALATION) at 11:51

## 2019-02-05 RX ADMIN — IPRATROPIUM BROMIDE AND ALBUTEROL SULFATE 1 AMPULE: .5; 3 SOLUTION RESPIRATORY (INHALATION) at 11:46

## 2019-02-07 RX ORDER — TORSEMIDE 20 MG/1
40 TABLET ORAL DAILY
Qty: 60 TABLET | Refills: 3 | Status: SHIPPED | OUTPATIENT
Start: 2019-02-07 | End: 2019-06-08 | Stop reason: SDUPTHER

## 2019-02-07 RX ORDER — ROSUVASTATIN CALCIUM 40 MG/1
TABLET, COATED ORAL
Qty: 30 TABLET | Refills: 3 | Status: SHIPPED | OUTPATIENT
Start: 2019-02-07 | End: 2019-06-22 | Stop reason: SDUPTHER

## 2019-02-11 PROBLEM — J32.8 OTHER CHRONIC SINUSITIS: Status: ACTIVE | Noted: 2019-02-11

## 2019-02-12 ENCOUNTER — OFFICE VISIT (OUTPATIENT)
Dept: ENT CLINIC | Age: 79
End: 2019-02-12
Payer: MEDICARE

## 2019-02-12 VITALS — HEIGHT: 70 IN | BODY MASS INDEX: 28.2 KG/M2 | WEIGHT: 197 LBS

## 2019-02-12 DIAGNOSIS — J06.9 UPPER RESPIRATORY TRACT INFECTION, UNSPECIFIED TYPE: Primary | ICD-10-CM

## 2019-02-12 PROCEDURE — 1101F PT FALLS ASSESS-DOCD LE1/YR: CPT | Performed by: OTOLARYNGOLOGY

## 2019-02-12 PROCEDURE — 4004F PT TOBACCO SCREEN RCVD TLK: CPT | Performed by: OTOLARYNGOLOGY

## 2019-02-12 PROCEDURE — G8482 FLU IMMUNIZE ORDER/ADMIN: HCPCS | Performed by: OTOLARYNGOLOGY

## 2019-02-12 PROCEDURE — 99204 OFFICE O/P NEW MOD 45 MIN: CPT | Performed by: OTOLARYNGOLOGY

## 2019-02-12 PROCEDURE — 4040F PNEUMOC VAC/ADMIN/RCVD: CPT | Performed by: OTOLARYNGOLOGY

## 2019-02-12 PROCEDURE — G8598 ASA/ANTIPLAT THER USED: HCPCS | Performed by: OTOLARYNGOLOGY

## 2019-02-12 PROCEDURE — 1123F ACP DISCUSS/DSCN MKR DOCD: CPT | Performed by: OTOLARYNGOLOGY

## 2019-02-12 PROCEDURE — G8427 DOCREV CUR MEDS BY ELIG CLIN: HCPCS | Performed by: OTOLARYNGOLOGY

## 2019-02-12 PROCEDURE — G8417 CALC BMI ABV UP PARAM F/U: HCPCS | Performed by: OTOLARYNGOLOGY

## 2019-02-12 ASSESSMENT — ENCOUNTER SYMPTOMS
COUGH: 1
SINUS PRESSURE: 1
SHORTNESS OF BREATH: 1

## 2019-03-11 ENCOUNTER — CARE COORDINATION (OUTPATIENT)
Dept: CARE COORDINATION | Age: 79
End: 2019-03-11

## 2019-03-22 DIAGNOSIS — I10 ESSENTIAL HYPERTENSION: Chronic | ICD-10-CM

## 2019-03-22 RX ORDER — LISINOPRIL 5 MG/1
5 TABLET ORAL DAILY
Qty: 90 TABLET | Refills: 1 | Status: SHIPPED | OUTPATIENT
Start: 2019-03-22 | End: 2019-09-06 | Stop reason: SDUPTHER

## 2019-05-09 RX ORDER — ISOSORBIDE MONONITRATE 30 MG/1
30 TABLET, EXTENDED RELEASE ORAL DAILY
Qty: 90 TABLET | Refills: 3 | Status: SHIPPED | OUTPATIENT
Start: 2019-05-09 | End: 2020-03-31

## 2019-05-11 ENCOUNTER — HOSPITAL ENCOUNTER (EMERGENCY)
Age: 79
Discharge: HOME | End: 2019-05-11
Attending: EMERGENCY MEDICINE
Payer: MEDICARE

## 2019-05-11 VITALS
HEART RATE: 57 BPM | DIASTOLIC BLOOD PRESSURE: 79 MMHG | TEMPERATURE: 99.1 F | OXYGEN SATURATION: 100 % | RESPIRATION RATE: 12 BRPM | HEIGHT: 70 IN | BODY MASS INDEX: 29.83 KG/M2 | WEIGHT: 208.34 LBS | SYSTOLIC BLOOD PRESSURE: 174 MMHG

## 2019-05-11 DIAGNOSIS — H00.016 HORDEOLUM EXTERNUM OF LEFT EYE, UNSPECIFIED EYELID: Primary | ICD-10-CM

## 2019-05-11 DIAGNOSIS — R09.81 NASAL CONGESTION: ICD-10-CM

## 2019-05-11 PROCEDURE — 99282 EMERGENCY DEPT VISIT SF MDM: CPT

## 2019-05-11 RX ORDER — SULFAMETHOXAZOLE AND TRIMETHOPRIM 800; 160 MG/1; MG/1
1 TABLET ORAL 2 TIMES DAILY
Qty: 14 TABLET | Refills: 0 | Status: SHIPPED | OUTPATIENT
Start: 2019-05-11 | End: 2019-05-18

## 2019-05-11 RX ORDER — ECHINACEA PURPUREA EXTRACT 125 MG
2 TABLET ORAL EVERY 8 HOURS PRN
Qty: 1 BOTTLE | Refills: 0 | Status: SHIPPED | OUTPATIENT
Start: 2019-05-11 | End: 2019-06-20 | Stop reason: CLARIF

## 2019-05-11 RX ORDER — TETRACAINE HYDROCHLORIDE 5 MG/ML
2 SOLUTION OPHTHALMIC ONCE
Status: DISCONTINUED | OUTPATIENT
Start: 2019-05-11 | End: 2019-05-11

## 2019-05-11 ASSESSMENT — PAIN DESCRIPTION - PROGRESSION: CLINICAL_PROGRESSION: GRADUALLY WORSENING

## 2019-05-11 ASSESSMENT — PAIN DESCRIPTION - ONSET: ONSET: PROGRESSIVE

## 2019-05-11 ASSESSMENT — VISUAL ACUITY
OD: 20/40
OU: 20/40

## 2019-05-11 ASSESSMENT — PAIN DESCRIPTION - PAIN TYPE: TYPE: ACUTE PAIN

## 2019-05-11 ASSESSMENT — PAIN DESCRIPTION - ORIENTATION: ORIENTATION: LEFT

## 2019-05-11 ASSESSMENT — PAIN DESCRIPTION - LOCATION: LOCATION: EYE

## 2019-05-11 ASSESSMENT — PAIN SCALES - GENERAL: PAINLEVEL_OUTOF10: 2

## 2019-05-11 ASSESSMENT — PAIN DESCRIPTION - DESCRIPTORS: DESCRIPTORS: OTHER (COMMENT)

## 2019-05-11 ASSESSMENT — PAIN DESCRIPTION - FREQUENCY: FREQUENCY: CONTINUOUS

## 2019-05-11 NOTE — ED PROVIDER NOTES
[address]  [address] [facility] [address]  Dept: [phone]  Loc: [phone]    eMERGENCY dEPARTMENT eNCOUnter    Evaluated by the Advanced Practice Provider in conjunction with ED attending Dr. Penelope Hall. CHIEF COMPLAINT    Chief Complaint   Patient presents with    Eye Pain     left; placed on antibiotics no improvement        HPI    Dante Ambrosio is a [de-identified] y.o. male who presents with left upper eyelid erythema and swelling. Onset was 3-4 days ago, was seen by his primary care provider yesterday and was placed on Keflex. He has had 4 doses of 500 mg of Keflex. The duration has been constant since the onset. The patient has associated discomfort. There are no aggravating or alleviating factors. The context was a spontaneous onset, no history of injury. Denies any visual changes. Denies any fevers or chills. Has not immunocompromised.     REVIEW OF SYSTEMS    Eyes: see HPI  General: No fevers or chills  GI: No nausea or vomiting  Pulmonary: No shortness of breath or new cough    PAST MEDICAL and SURGICAL HISTORY    Past Medical History:   Diagnosis Date    Acute diastolic congestive heart failure ([address] 10/28/2016    Anxiety     Atrial fibrillation ([address] 11/28/2014    s/p ablation 2015    CAD (coronary artery disease)     Cancer ([address]     colon    Carotid artery stenosis     Cataracts, bilateral     Diverticulitis of colon     GERD (gastroesophageal reflux disease)     Hyperlipidemia     Hypertension     Liver disease 1959    in [place] [employer] [place] [facility] had Hepatitis    Melanoma in situ of back ([address]     Melanoma in situ of upper arm ([address] 2005    left/ excision by Dr Dary Jewell    Migraine with aura 3/21/2014    MRSA infection 12/13/2016    Osteoarthritis     Peptic ulcer disease     Seizures ([address]     Skin cancer (melanoma) ([address]     on back     Tobacco abuse 1/19/2017    Unspecified sleep apnea      Past rosuvastatin (CRESTOR) 40 MG tablet TAKE 1 TABLET BY MOUTH DAILY 30 tablet 3    albuterol sulfate HFA (PROVENTIL HFA) 108 (90 Base) MCG/ACT inhaler Inhale 2 puffs into the lungs every 6 hours as needed for Wheezing 1 Inhaler 3    XARELTO 20 MG TABS tablet TAKE 1 TABLET BY MOUTH DAILY WITH SUPPER 90 tablet 3    varenicline (CHANTIX STARTING MONTH KWAKU) 0.5 MG X 11 & 1 MG X 42 tablet Take by mouth. 1 each 0    amiodarone (CORDARONE) 200 MG tablet TAKE 1 TABLET BY MOUTH DAILY 60 tablet 5    diclofenac sodium (VOLTAREN) 1 % GEL Apply 4 g topically 4 times daily 5 Tube 0    escitalopram (LEXAPRO) 10 MG tablet TAKE 1 TABLET BY MOUTH DAILY 90 tablet 3    magnesium (MAGNESIUM-OXIDE) 250 MG TABS tablet Take 250 mg by mouth daily      acetaminophen (TYLENOL) 500 MG tablet Take 500 mg by mouth every 6 hours as needed      loratadine (CLARITIN) 10 MG tablet Take 1 tablet by mouth daily 30 tablet 3       ALLERGIES    No Known Allergies    SOCIAL and FAMILY HISTORY    Social History     Socioeconomic History    Marital status:      Spouse name: None    Number of children: 3    Years of education: None    Highest education level: None   Occupational History    Occupation: Self Employed   Social Needs    Financial resource strain: None    Food insecurity:     Worry: None     Inability: None    Transportation needs:     Medical: None     Non-medical: None   Tobacco Use    Smoking status: Current Every Day Smoker     Packs/day: 0.25     Years: 4.00     Pack years: 1.00     Types: Cigarettes    Smokeless tobacco: Never Used    Tobacco comment: some days none some 1/2 pack   Substance and Sexual Activity    Alcohol use:  Yes     Alcohol/week: 1.8 oz     Types: 3 Glasses of wine per week     Comment: 3 glasses per week    Drug use: No    Sexual activity: Yes     Partners: Female   Lifestyle    Physical activity:     Days per week: None     Minutes per session: None    Stress: None   Relationships    Social connections:     Talks on phone: None     Gets together: None     Attends Druze service: None     Active member of club or organization: None     Attends meetings of clubs or organizations: None     Relationship status: None    Intimate partner violence:     Fear of current or ex partner: None     Emotionally abused: None     Physically abused: None     Forced sexual activity: None   Other Topics Concern    None   Social History Narrative    None     Family History   Problem Relation Age of Onset    Cancer Father     Cancer Sister     Cancer Brother     Cancer Brother     Cancer Brother        PHYSICAL EXAM    VITAL SIGNS: BP (!) 174/79   Pulse 57   Temp 99.1 °F (37.3 °C) (Oral)   Resp 12   Ht 5' 10\" (1.778 m)   Wt 208 lb 5.4 oz (94.5 kg)   SpO2 100%   BMI 29.89 kg/m²   Constitutional:  Well developed, well nourished, no acute distress  Eyes: +mild edema, erythema and tenderness of the upper eyelid, pupils equally round and reactive to light, extraocular movements intact, conjunctiva of the affected eye is noninjected, no discharge, no foreign body visualized  Visual acuity, see nursing note for visual acuities. HENT:  Atraumatic, external ears normal, nose normal, mild periorbital edema and erythema of the left  Neck: Supple, no neck swelling   Respiratory:  No retractions  Cardiovascular:  No JVD  Integument:  Warm dry skin, see eye exam above  Neurologic:  No slurred speech    RADIOLOGY  No orders to display       PROCEDURES    n/a    ED COURSE & MEDICAL DECISION MAKING    See electronic medical records for medications prescribed    Differential diagnosis: Hordeolum, Chalazion, Xanthelasma, Conjunctivitis, Herpes Keratitis, Periorbital/Preseptal Cellulitis, other. Patient is afebrile and nontoxic in appearance. Physical exam consistent with hordeolum. Patient educated on using warm compresses 4 times a day for 15 minutes.   He was instructed to continue Keflex, will add Bactrim and bacitracin ointment due to the worsening erythema, swelling to broadly cover him as he may have a developing mild cellulitis. I do not think that it is an orbital cellulitis and does not require any CT imaging at this time. The patient was instructed to follow up as an outpatient in 1-2 days with [facility]. The patient was instructed to return to the ED immediately for any new or worsening symptoms. The patient verbalized understanding. I have evaluated this patient in conjunction with ED attending Dr. Bebeto Dowd. FINAL IMPRESSION    1. Hordeolum externum of left eye, unspecified eyelid    2.  Nasal congestion        PLAN  Discharge with medication and followup with ophthalmologist/[facility] (see EMR)      (Please note that this note was completed with a voice recognition program.  Every attempt was made to edit the dictations, but inevitably there remain words that are mis-transcribed.)     SAVANNAH Royal - FRANKY  05/11/19 [id number]

## 2019-05-11 NOTE — ED NOTES
D/C: Order noted for d/c. Pt confirmed d/c paperwork and 3 prescriptions have correct name. Discharge and education instructions reviewed with patient. Teach-back successful. Pt verbalized understanding and signed d/c papers. Pt denied questions at this time. No acute distress noted. Patient instructed to follow-up as noted - return to emergency department if symptoms worsen. Patient verbalized understanding. Discharged per EDMD with discharge instructions. Pt discharged and ambulated to private vehicle. Patient stable upon departure. Thanked patient for choosing [facility]) for care.             Jef Etienne, NICA  05/11/19 [id number]

## 2019-05-13 PROBLEM — H00.019 STYE: Status: ACTIVE | Noted: 2019-05-13

## 2019-06-11 RX ORDER — TORSEMIDE 20 MG/1
40 TABLET ORAL DAILY
Qty: 60 TABLET | Refills: 3 | Status: SHIPPED | OUTPATIENT
Start: 2019-06-11 | End: 2019-09-06 | Stop reason: SDUPTHER

## 2019-06-20 ENCOUNTER — OFFICE VISIT (OUTPATIENT)
Dept: CARDIOLOGY CLINIC | Age: 79
End: 2019-06-20
Payer: MEDICARE

## 2019-06-20 VITALS
WEIGHT: 207 LBS | HEIGHT: 70 IN | DIASTOLIC BLOOD PRESSURE: 72 MMHG | OXYGEN SATURATION: 98 % | BODY MASS INDEX: 29.63 KG/M2 | HEART RATE: 54 BPM | SYSTOLIC BLOOD PRESSURE: 138 MMHG

## 2019-06-20 DIAGNOSIS — I48.0 PAROXYSMAL ATRIAL FIBRILLATION (HCC): ICD-10-CM

## 2019-06-20 DIAGNOSIS — G47.33 OSA (OBSTRUCTIVE SLEEP APNEA): ICD-10-CM

## 2019-06-20 DIAGNOSIS — I10 ESSENTIAL HYPERTENSION: ICD-10-CM

## 2019-06-20 DIAGNOSIS — I73.9 PAD (PERIPHERAL ARTERY DISEASE) (HCC): ICD-10-CM

## 2019-06-20 DIAGNOSIS — E78.5 HYPERLIPIDEMIA LDL GOAL <70: ICD-10-CM

## 2019-06-20 DIAGNOSIS — I50.32 CHRONIC DIASTOLIC CHF (CONGESTIVE HEART FAILURE), NYHA CLASS 2 (HCC): Primary | ICD-10-CM

## 2019-06-20 DIAGNOSIS — I25.10 CORONARY ARTERY DISEASE INVOLVING NATIVE CORONARY ARTERY OF NATIVE HEART WITHOUT ANGINA PECTORIS: ICD-10-CM

## 2019-06-20 PROCEDURE — G8417 CALC BMI ABV UP PARAM F/U: HCPCS | Performed by: INTERNAL MEDICINE

## 2019-06-20 PROCEDURE — G8598 ASA/ANTIPLAT THER USED: HCPCS | Performed by: INTERNAL MEDICINE

## 2019-06-20 PROCEDURE — 4040F PNEUMOC VAC/ADMIN/RCVD: CPT | Performed by: INTERNAL MEDICINE

## 2019-06-20 PROCEDURE — 4004F PT TOBACCO SCREEN RCVD TLK: CPT | Performed by: INTERNAL MEDICINE

## 2019-06-20 PROCEDURE — 99214 OFFICE O/P EST MOD 30 MIN: CPT | Performed by: INTERNAL MEDICINE

## 2019-06-20 PROCEDURE — 93000 ELECTROCARDIOGRAM COMPLETE: CPT | Performed by: INTERNAL MEDICINE

## 2019-06-20 PROCEDURE — 1123F ACP DISCUSS/DSCN MKR DOCD: CPT | Performed by: INTERNAL MEDICINE

## 2019-06-20 PROCEDURE — G8427 DOCREV CUR MEDS BY ELIG CLIN: HCPCS | Performed by: INTERNAL MEDICINE

## 2019-06-20 NOTE — PATIENT INSTRUCTIONS
Patient Education        Atrial Fibrillation: Care Instructions  Your Care Instructions    Atrial fibrillation is an irregular and often fast heartbeat. Treating this condition is important for several reasons. It can cause blood clots, which can travel from your heart to your brain and cause a stroke. If you have a fast heartbeat, you may feel lightheaded, dizzy, and weak. An irregular heartbeat can also increase your risk for heart failure. Atrial fibrillation is often the result of another heart condition, such as high blood pressure or coronary artery disease. Making changes to improve your heart condition will help you stay healthy and active. Follow-up care is a key part of your treatment and safety. Be sure to make and go to all appointments, and call your doctor if you are having problems. It's also a good idea to know your test results and keep a list of the medicines you take. How can you care for yourself at home? Medicines    · Take your medicines exactly as prescribed. Call your doctor if you think you are having a problem with your medicine. You will get more details on the specific medicines your doctor prescribes.     · If your doctor has given you a blood thinner to prevent a stroke, be sure you get instructions about how to take your medicine safely. Blood thinners can cause serious bleeding problems.     · Do not take any vitamins, over-the-counter drugs, or herbal products without talking to your doctor first.    Lifestyle changes    · Do not smoke. Smoking can increase your chance of a stroke and heart attack. If you need help quitting, talk to your doctor about stop-smoking programs and medicines. These can increase your chances of quitting for good.     · Eat a heart-healthy diet.     · Stay at a healthy weight. Lose weight if you need to.     · Limit alcohol to 2 drinks a day for men and 1 drink a day for women. Too much alcohol can cause health problems.     · Avoid colds and flu.  Get a pneumococcal vaccine shot. If you have had one before, ask your doctor whether you need another dose. Get a flu shot every year. If you must be around people with colds or flu, wash your hands often. Activity    · If your doctor recommends it, get more exercise. Walking is a good choice. Bit by bit, increase the amount you walk every day. Try for at least 30 minutes on most days of the week. You also may want to swim, bike, or do other activities. Your doctor may suggest that you join a cardiac rehabilitation program so that you can have help increasing your physical activity safely.     · Start light exercise if your doctor says it is okay. Even a small amount will help you get stronger, have more energy, and manage stress. Walking is an easy way to get exercise. Start out by walking a little more than you did in the hospital. Gradually increase the amount you walk.     · When you exercise, watch for signs that your heart is working too hard. You are pushing too hard if you cannot talk while you are exercising. If you become short of breath or dizzy or have chest pain, sit down and rest immediately.     · Check your pulse regularly. Place two fingers on the artery at the palm side of your wrist, in line with your thumb. If your heartbeat seems uneven or fast, talk to your doctor. When should you call for help? Call 911 anytime you think you may need emergency care. For example, call if:    · You have symptoms of a heart attack. These may include:  ? Chest pain or pressure, or a strange feeling in the chest.  ? Sweating. ? Shortness of breath. ? Nausea or vomiting. ? Pain, pressure, or a strange feeling in the back, neck, jaw, or upper belly or in one or both shoulders or arms. ? Lightheadedness or sudden weakness. ? A fast or irregular heartbeat. After you call 911, the  may tell you to chew 1 adult-strength or 2 to 4 low-dose aspirin. Wait for an ambulance.  Do not try to drive yourself.     · You have symptoms of a stroke. These may include:  ? Sudden numbness, tingling, weakness, or loss of movement in your face, arm, or leg, especially on only one side of your body. ? Sudden vision changes. ? Sudden trouble speaking. ? Sudden confusion or trouble understanding simple statements. ? Sudden problems with walking or balance. ? A sudden, severe headache that is different from past headaches.     · You passed out (lost consciousness).    Call your doctor now or seek immediate medical care if:    · You have new or increased shortness of breath.     · You feel dizzy or lightheaded, or you feel like you may faint.     · Your heart rate becomes irregular.     · You can feel your heart flutter in your chest or skip heartbeats. Tell your doctor if these symptoms are new or worse.    Watch closely for changes in your health, and be sure to contact your doctor if you have any problems. Where can you learn more? Go to https://[employer].nxtControl. org and sign in to your [employer] account. Enter U020 in the [employer] box to learn more about \"Atrial Fibrillation: Care Instructions. \"     If you do not have an account, please click on the \"Sign Up Now\" link. Current as of: July 22, 2018  Content Version: 12.0  © 7781-0566 Healthwise, Incorporated. Care instructions adapted under license by [facility] ([facility]). If you have questions about a medical condition or this instruction, always ask your healthcare professional. [place] [address] any warranty or liability for your use of this information. Patient Education        Learning About Heart Failure Zones  What are heart failure zones? Heart failure zones give you an easy way to see changes in your heart failure symptoms. They also tell you when you need to get help. Check every day to see which zone you are in. Green zone. You are doing well. This is where you want to be. · Your weight is stable.  This means it is not going up or down. · You breathe easily. · You are sleeping well. You are able to lie flat without shortness of breath. · You can do your usual activities. Yellow zone. Be careful. Your symptoms are changing. Call your doctor. · You have new or increased shortness of breath. · You are dizzy or lightheaded, or you feel like you may faint. · You have sudden weight gain, such as more than 2 to 3 pounds in a day or 5 pounds in a week. (Your doctor may suggest a different range of weight gain.)  · You have increased swelling in your legs, ankles, or feet. · You are so tired or weak that you cannot do your usual activities. · You are not sleeping well. Shortness of breath wakes you up at night. You need extra pillows. Your doctor's name: ____________________________________________________________  Your doctor's contact information: _________________________________________________  Red zone. This is an emergency. Call 911. You have symptoms of sudden heart failure, such as:  · You have severe trouble breathing. · You cough up pink, foamy mucus. · You have a new irregular or fast heartbeat. You have symptoms of a heart attack. These may include:  · Chest pain or pressure, or a strange feeling in the chest.  · Sweating. · Shortness of breath. · Nausea or vomiting. · Pain, pressure, or a strange feeling in the back, neck, jaw, or upper belly or in one or both shoulders or arms. · Lightheadedness or sudden weakness. · A fast or irregular heartbeat. If you have symptoms of a heart attack: After you call 911, the  may tell you to chew 1 adult-strength or 2 to 4 low-dose aspirin. Wait for an ambulance. Do not try to drive yourself. Follow-up care is a key part of your treatment and safety. Be sure to make and go to all appointments, and call your doctor if you are having problems. It's also a good idea to know your test results and keep a list of the medicines you take. Where can you learn more?   Go to https://chpepiceweb.healthFÃ¤ltcommunications AB. org and sign in to your [employer] account. Enter T174 in the [facility] box to learn more about \"Learning About Heart Failure Zones. \"     If you do not have an account, please click on the \"Sign Up Now\" link. Current as of: July 22, 2018  Content Version: 12.0  © 8935-0441 Healthwise, Incorporated. Care instructions adapted under license by [facility] ([facility]). If you have questions about a medical condition or this instruction, always ask your healthcare professional. [address] any warranty or liability for your use of this information.

## 2019-06-20 NOTE — PROGRESS NOTES
[address] [facility] - Cardiology        Luana Morales is a [de-identified] y.o. patient with a past medical history significant for atrial fibrillation, seizure disorder and CAD with prior PCI and then CABG X 2 vessels in November of 2012. He has atrial fibrillation and underwent an ablation by my partner Dr. Birgit Jeronimo. Unfortunately he had a recurrence of this and was placed on amiodarone and cardioverted. He was readmited to [facility] on 10/28/16-11/1/16 with some volume overload and was in a-fib again. He returns to the office today in follow-up. Today, he reports feeling well overall. He states while in [place] he had a sinus infection. He has completed a sleep study and was diagnosed with sleep apnea and is now using CPAP therapy. He reports improvement in his sleep and energy level. He denies chest pain or shortness of breath. Denies any PND or orthopnea. Denies any cough productive or otherwise. No recent change in weight. No recent changes in bowel or bladder habits. No easy bleeding or bruising. Denies any arthralgias or myalgias. Review of systems is negative to a 6 point review except as noted above.     Current Outpatient Medications   Medication Sig Dispense Refill    CPAP Machine MISC by Does not apply route      torsemide (DEMADEX) 20 MG tablet TAKE 2 TABLETS BY MOUTH DAILY 60 tablet 3    isosorbide mononitrate (IMDUR) 30 MG extended release tablet TAKE 1 TABLET BY MOUTH DAILY 90 tablet 3    levothyroxine (SYNTHROID) 25 MCG tablet TAKE 1 TABLET BY MOUTH DAILY 30 tablet 5    lisinopril (PRINIVIL;ZESTRIL) 5 MG tablet TAKE 1 TABLET BY MOUTH DAILY 90 tablet 1    levETIRAcetam (KEPPRA) 750 MG tablet TAKE 1 TABLET BY MOUTH TWICE A DAY 60 tablet 5    rosuvastatin (CRESTOR) 40 MG tablet TAKE 1 TABLET BY MOUTH DAILY 30 tablet 3    albuterol sulfate HFA (PROVENTIL HFA) 108 (90 Base) MCG/ACT inhaler Inhale 2 puffs into the lungs every 6 hours as needed for Wheezing 1 Inhaler 3    XARELTO 20 MG TABS tablet TAKE 1 TABLET BY MOUTH DAILY WITH SUPPER 90 tablet 3    varenicline (CHANTIX STARTING MONTH KWAKU) 0.5 MG X 11 & 1 MG X 42 tablet Take by mouth. 1 each 0    amiodarone (CORDARONE) 200 MG tablet TAKE 1 TABLET BY MOUTH DAILY (Patient taking differently: TAKE 0.5 TABLET BY MOUTH DAILY) 60 tablet 5    escitalopram (LEXAPRO) 10 MG tablet TAKE 1 TABLET BY MOUTH DAILY 90 tablet 3    magnesium (MAGNESIUM-OXIDE) 250 MG TABS tablet Take 250 mg by mouth daily      acetaminophen (TYLENOL) 500 MG tablet Take 500 mg by mouth every 6 hours as needed      loratadine (CLARITIN) 10 MG tablet Take 1 tablet by mouth daily 30 tablet 3    diclofenac sodium (VOLTAREN) 1 % GEL Apply 4 g topically 4 times daily 5 Tube 0     No current facility-administered medications for this visit.         No Known Allergies    Physical Exam:  Vitals:    06/20/19 1408   BP: 138/72   Site: Left Upper Arm   Position: Sitting   Cuff Size: Small Adult   Pulse: 54   SpO2: 98%   Weight: 207 lb (93.9 kg)   Height: 5' 10\" (1.778 m)        Wt Readings from Last 2 Encounters:   06/20/19 207 lb (93.9 kg)   05/13/19 207 lb (93.9 kg)     General Appearance:  Alert, cooperative, no distress, appears stated age   Head:  Normocephalic, without obvious abnormality, atraumatic   Eyes:  PERRL, conjunctiva/corneas clear       Nose: Nares normal, no drainage or sinus tenderness   Throat: Lips, mucosa, and tongue normal   Neck: Supple, symmetrical, trachea midline, no adenopathy, thyroid: not enlarged, symmetric, no tenderness/mass/nodules, no carotid bruit or JVD       Lungs:   Clear to auscultation bilaterally, respirations unlabored   Heart:  Regular but lakesha, normal S1/S2, no M/R/G      Abdomen:   Soft, non-tender, bowel sounds active all four quadrants,  no masses, no organomegaly   Extremities: Extremities normal, atraumatic, no cyanosis or edema   Pulses: Carotid      L   2      R2  Radial       L    2     R2     Skin: Petechial rash on bilateral lower legs above the sock line up to the knee   Pysch: Normal mood and affect   Neurologic: Normal     Pulses: Carotid      L   2     R2  Radial       L   2     R2  Femoral    L   2     R2  Popliteal    L   2     R2  DP            L   1 +doppler      R0 +doppler  PT             L   1 +dopple     R1 +doppler(weak)           Lab Results   Component Value Date    TRIG 197 06/08/2018    HDL 44 06/08/2018    LDLCALC 77 06/08/2018    LABVLDL 39 06/08/2018     Lab Results   Component Value Date     02/05/2019    K 4.6 02/05/2019    K 4.6 10/30/2018    BUN 25 02/05/2019    CREATININE 1.1 02/05/2019    No components found for: HGBA1C    Lab Results   Component Value Date    TSH 3.38 12/11/2018     ECG 10/22/15: Sinus Bradycardia  ECG 6/15/16: Sinus Bradycardia, HR 49  ECG 8/10/17: Sinus rhythm  ECG 10/5/17: sinus bradycardia HR 48  ECG 4/12/18: Sinus Bradycardia, QTc 433ms          ECG 6/20/19: Sinus bradycardia      Procedures:     LHC 11/13/2012 (Dr. Hortencia Becerra)   LV wall motion: Normal LV wall motion  LV gram - 60% EF   Normal EDP  LMCA distal 75% lesion with disruption and possible mobile plaque   Proximal 80% LAD densely calcified lesion   Circumflex: entire artery has luminal irregularities, non-dominate  RCA: Proximal widely patent in previously placed stent; entire vessel has luminal irregularities; Mid 30% tapering lesion with arteriomegaly. CABG 11/14/2012 (Dr. Tabitha Rees)   1. Transesophageal echocardiogram.  2.  Endoscopic vein harvesting. 3.  Urgent coronary artery bypass graft x2 (left internal mammary artery to  left anterior descending, saphenous vein graft to obtuse marginal 1). Imaging:     Echo 8/20/14:  Normal left ventricular size and systolic function. Ejection fraction is visually estimated to be 50%. Normal right ventricular size and function. The left atrium is mildly dilated. There is mild tricuspid regurgitation with RVSP estimated at 32 mmHg.     CT head wo he is seen in office with Dr. Rowena Libman. He is compliant with his CPAP therapy and is benefiting from this. I will see him in office for follow up in 6 months. This note was scribed in the presence of Kenyosef Black MD by [employer], RN. Physician Attestation:  The scribes documentation has been prepared under my direction and personally reviewed by me in its entirety. I, Dr. Ramona Posada personally performed the services described in this documentation as scribed by my RN,  [employer] in my presence, and I confirm that the note above accurately reflects all work, treatment, procedures, and medical decision making performed by me.

## 2019-06-24 RX ORDER — ROSUVASTATIN CALCIUM 40 MG/1
TABLET, COATED ORAL
Qty: 30 TABLET | Refills: 3 | Status: SHIPPED | OUTPATIENT
Start: 2019-06-24 | End: 2019-10-05 | Stop reason: SDUPTHER

## 2019-07-12 ENCOUNTER — HOSPITAL ENCOUNTER (OUTPATIENT)
Dept: VASCULAR LAB | Age: 79
Discharge: HOME | End: 2019-07-12
Payer: MEDICARE

## 2019-07-12 DIAGNOSIS — I65.22 OCCLUSION AND STENOSIS OF LEFT CAROTID ARTERY: ICD-10-CM

## 2019-07-12 DIAGNOSIS — I65.29 STENOSIS OF CAROTID ARTERY, UNSPECIFIED LATERALITY: ICD-10-CM

## 2019-07-12 PROCEDURE — 93880 EXTRACRANIAL BILAT STUDY: CPT

## 2019-09-06 DIAGNOSIS — I10 ESSENTIAL HYPERTENSION: Chronic | ICD-10-CM

## 2019-09-16 NOTE — PROGRESS NOTES
recorded speech stimuli. This finding is consistent with hearing sensitivity. Tympanometry: Flat, no peak pressure or compliance, Type B tympanogram, with typical ear canal volume, consistent with middle ear fluid. LEFT EAR:  Hearing Sensitivity: Moderately-severe through 1000 Hz sloping to profound sensorineural hearing loss with no response at the limits of the equipment 5612-9852 Hz. Speech Recognition Threshold: 75 dB HL, masked  Word Recognition: Very Poor (38%), based on NU-6 50-word list at 95 dBHL, masked, using recorded speech stimuli. This finding is consistent with hearing sensitivity. Tympanometry: Flat, no peak pressure or compliance, Type B tympanogram, with typical ear canal volume, consistent with middle ear fluid. NOTE: There is an asymmetry of 25 dB HL at 250 Hz only with left ear worse than right ear; all other findings are symmetric in nature. Very poor word recognition bilaterally, difference between ears (left ear worse than right ear) is not clinically significant at this time. Reliability: Good  Transducer: Inserts, checked with Phones    See scanned audiogram dated 9/17/19 for results. PATIENT EDUCATION:       The following items were discussed with the patient:    - Good Communication Strategies   - Hearing Loss and Hearing Aids   - Tinnitus Management Strategies   - Noise-Induced Hearing Loss and use of Hearing Protection Devices (HPDs)    Educational information was shared in the After Visit Summary.                                                 RECOMMENDATIONS:                                                                                                                                                                                                                                                                      The following items are recommended based on patient report and results from today's appointment:   - Continue medical follow-up with Sandra Phelps poor word recognition bilaterally. - Utilize \"Good Communication Strategies\" as discussed to assist in speech understanding with communication partners. These strategies are extremely important, even with use of hearing aids, give very poor word recognition bilaterally. - Maintain a sound enriched environment to assist in the management of tinnitus symptoms.     - Use hearing protection devices (HPDs), such as protective ear muffs and ear plugs, when exposed to dangerous sound levels. [facility] [place], [place]  Audiologist      Chart CC'd to:  SAVANNAH Viramontes, CNP      Degree of   Hearing Sensitivity dB Range   Within Normal Limits (WNL) 0 - 20   Mild 20 - 40   Moderate 40 - 55   Moderately-Severe 55 - 70   Severe 70 - 90   Profound 90 +

## 2019-09-17 ENCOUNTER — PROCEDURE VISIT (OUTPATIENT)
Dept: AUDIOLOGY | Age: 79
End: 2019-09-17
Payer: MEDICARE

## 2019-09-17 DIAGNOSIS — H93.8X1 EAR PRESSURE, RIGHT: ICD-10-CM

## 2019-09-17 DIAGNOSIS — H93.12 TINNITUS, LEFT EAR: ICD-10-CM

## 2019-09-17 DIAGNOSIS — H90.3 SENSORINEURAL HEARING LOSS, BILATERAL: Primary | ICD-10-CM

## 2019-09-17 PROCEDURE — 92567 TYMPANOMETRY: CPT | Performed by: AUDIOLOGIST

## 2019-09-17 PROCEDURE — 92557 COMPREHENSIVE HEARING TEST: CPT | Performed by: AUDIOLOGIST

## 2019-09-17 RX ORDER — TORSEMIDE 20 MG/1
40 TABLET ORAL DAILY
Qty: 60 TABLET | Refills: 3 | Status: SHIPPED | OUTPATIENT
Start: 2019-09-17 | End: 2020-01-16

## 2019-09-17 NOTE — PATIENT INSTRUCTIONS
treatment and safety. Be sure to make and go to all appointments, and call your doctor if you are having problems. It's also a good idea to know your test results and keep a list of the medicines you take. How can you care for yourself at home? · Limit or cut out alcohol, caffeine, and sodium. They can make your symptoms worse. · Do not smoke or use other tobacco products. Nicotine reduces blood flow to the ear and makes tinnitus worse. If you need help quitting, talk to your doctor about stop-smoking programs and medicines. These can increase your chances of quitting for good. · Talk to your doctor about whether to stop taking aspirin and similar products such as ibuprofen or naproxen. · Get exercise often. It can help improve blood flow to the ear. Ways to manage/cope with tinnitus  Some tinnitus may last a long time. To manage your tinnitus, try to:  · Avoid noises that you think caused your tinnitus. If you can't avoid loud noises, wear earplugs or earmuffs. · Ignore the sound by paying attention to other things. Keeping your brain busy with other tasks or background noise can help your brain not focus on the tinnitus. · Try to not give the tinnitus an emotional reaction. Do your best to ignore the sound and not let it bother you. Relax using biofeedback, meditation, or yoga. Feeling stressed and being tired can make tinnitus worse. · Play music or white noise to help you sleep. Background noise may cover up the noise that you hear in your ears. You can buy a tabletop machine or a device that sits under your pillow to play soothing sounds, like ocean waves. · Smart phones have free apps, such as Whist, Relax Melodies, ReSound Relief, and White Noise Lite. These apps have different types of sounds/noise, some of which you can blend together to find sounds that are most soothing to you.   · Hearing aid technology, especially when there is some hearing loss, may help reduce tinnitus symptoms by

## 2019-09-20 RX ORDER — LISINOPRIL 5 MG/1
5 TABLET ORAL DAILY
Qty: 90 TABLET | Refills: 1 | Status: SHIPPED | OUTPATIENT
Start: 2019-09-20 | End: 2019-10-09 | Stop reason: SDUPTHER

## 2019-10-09 DIAGNOSIS — I10 ESSENTIAL HYPERTENSION: Chronic | ICD-10-CM

## 2019-10-10 RX ORDER — LISINOPRIL 5 MG/1
5 TABLET ORAL DAILY
Qty: 90 TABLET | Refills: 1 | Status: SHIPPED | OUTPATIENT
Start: 2019-10-10 | End: 2020-05-28

## 2019-10-25 RX ORDER — ROSUVASTATIN CALCIUM 40 MG/1
TABLET, COATED ORAL
Qty: 30 TABLET | Refills: 3 | Status: SHIPPED | OUTPATIENT
Start: 2019-10-25 | End: 2020-02-05

## 2019-10-30 ENCOUNTER — OFFICE VISIT (OUTPATIENT)
Dept: ENT CLINIC | Age: 79
End: 2019-10-30
Payer: MEDICARE

## 2019-10-30 VITALS
BODY MASS INDEX: 29.92 KG/M2 | DIASTOLIC BLOOD PRESSURE: 62 MMHG | TEMPERATURE: 96.8 F | HEART RATE: 73 BPM | SYSTOLIC BLOOD PRESSURE: 114 MMHG | HEIGHT: 70 IN | WEIGHT: 209 LBS

## 2019-10-30 DIAGNOSIS — H69.83 EUSTACHIAN TUBE DYSFUNCTION, BILATERAL: Primary | ICD-10-CM

## 2019-10-30 DIAGNOSIS — H90.3 SENSORINEURAL HEARING LOSS (SNHL) OF BOTH EARS: ICD-10-CM

## 2019-10-30 PROCEDURE — 99213 OFFICE O/P EST LOW 20 MIN: CPT | Performed by: OTOLARYNGOLOGY

## 2019-11-04 RX ORDER — AMIODARONE HYDROCHLORIDE 200 MG/1
TABLET ORAL
Qty: 60 TABLET | Refills: 5 | Status: SHIPPED | OUTPATIENT
Start: 2019-11-04 | End: 2020-11-20

## 2019-11-06 PROBLEM — S40.022A ARM BRUISE, LEFT, INITIAL ENCOUNTER: Status: ACTIVE | Noted: 2019-11-06

## 2019-12-05 RX ORDER — RIVAROXABAN 20 MG/1
20 TABLET, FILM COATED ORAL
Qty: 90 TABLET | Refills: 3 | Status: SHIPPED | OUTPATIENT
Start: 2019-12-05 | End: 2020-11-20

## 2019-12-12 ENCOUNTER — OFFICE VISIT (OUTPATIENT)
Dept: CARDIOLOGY CLINIC | Age: 79
End: 2019-12-12
Payer: MEDICARE

## 2019-12-12 VITALS
OXYGEN SATURATION: 98 % | WEIGHT: 205 LBS | HEIGHT: 70 IN | BODY MASS INDEX: 29.35 KG/M2 | HEART RATE: 67 BPM | SYSTOLIC BLOOD PRESSURE: 120 MMHG | DIASTOLIC BLOOD PRESSURE: 60 MMHG

## 2019-12-12 DIAGNOSIS — I50.32 CHRONIC DIASTOLIC CHF (CONGESTIVE HEART FAILURE), NYHA CLASS 2 (HCC): Primary | ICD-10-CM

## 2019-12-12 DIAGNOSIS — I48.0 PAF (PAROXYSMAL ATRIAL FIBRILLATION) (HCC): ICD-10-CM

## 2019-12-12 DIAGNOSIS — I73.9 PAD (PERIPHERAL ARTERY DISEASE) (HCC): ICD-10-CM

## 2019-12-12 DIAGNOSIS — E78.2 MIXED HYPERLIPIDEMIA: ICD-10-CM

## 2019-12-12 DIAGNOSIS — G47.33 OSA (OBSTRUCTIVE SLEEP APNEA): ICD-10-CM

## 2019-12-12 DIAGNOSIS — I25.10 CORONARY ARTERY DISEASE INVOLVING NATIVE CORONARY ARTERY OF NATIVE HEART WITHOUT ANGINA PECTORIS: ICD-10-CM

## 2019-12-12 PROCEDURE — 99214 OFFICE O/P EST MOD 30 MIN: CPT | Performed by: INTERNAL MEDICINE

## 2019-12-12 PROCEDURE — 93000 ELECTROCARDIOGRAM COMPLETE: CPT | Performed by: INTERNAL MEDICINE

## 2019-12-12 PROCEDURE — 4004F PT TOBACCO SCREEN RCVD TLK: CPT | Performed by: INTERNAL MEDICINE

## 2019-12-12 PROCEDURE — G8417 CALC BMI ABV UP PARAM F/U: HCPCS | Performed by: INTERNAL MEDICINE

## 2019-12-12 PROCEDURE — G8482 FLU IMMUNIZE ORDER/ADMIN: HCPCS | Performed by: INTERNAL MEDICINE

## 2019-12-12 PROCEDURE — 1123F ACP DISCUSS/DSCN MKR DOCD: CPT | Performed by: INTERNAL MEDICINE

## 2019-12-12 PROCEDURE — G8427 DOCREV CUR MEDS BY ELIG CLIN: HCPCS | Performed by: INTERNAL MEDICINE

## 2019-12-12 PROCEDURE — 4040F PNEUMOC VAC/ADMIN/RCVD: CPT | Performed by: INTERNAL MEDICINE

## 2019-12-12 PROCEDURE — G8598 ASA/ANTIPLAT THER USED: HCPCS | Performed by: INTERNAL MEDICINE

## 2020-01-15 ENCOUNTER — HOSPITAL ENCOUNTER (OUTPATIENT)
Age: 80
Discharge: HOME | End: 2020-01-15
Payer: MEDICARE

## 2020-01-15 ENCOUNTER — HOSPITAL ENCOUNTER (OUTPATIENT)
Dept: GENERAL RADIOLOGY | Age: 80
Discharge: HOME | End: 2020-01-15
Payer: MEDICARE

## 2020-01-15 PROBLEM — R53.83 FATIGUE: Status: ACTIVE | Noted: 2020-01-15

## 2020-01-15 PROCEDURE — 71046 X-RAY EXAM CHEST 2 VIEWS: CPT

## 2020-02-05 RX ORDER — ROSUVASTATIN CALCIUM 40 MG/1
TABLET, COATED ORAL
Qty: 30 TABLET | Refills: 3 | Status: SHIPPED | OUTPATIENT
Start: 2020-02-05 | End: 2020-06-30

## 2020-03-03 RX ORDER — TORSEMIDE 20 MG/1
TABLET ORAL
Qty: 60 TABLET | Refills: 3 | Status: SHIPPED | OUTPATIENT
Start: 2020-03-03 | End: 2020-08-26

## 2020-03-31 RX ORDER — ISOSORBIDE MONONITRATE 30 MG/1
30 TABLET, EXTENDED RELEASE ORAL DAILY
Qty: 90 TABLET | Refills: 3 | Status: SHIPPED | OUTPATIENT
Start: 2020-03-31 | End: 2021-04-27

## 2020-05-28 RX ORDER — LISINOPRIL 5 MG/1
5 TABLET ORAL DAILY
Qty: 90 TABLET | Refills: 3 | Status: SHIPPED | OUTPATIENT
Start: 2020-05-28 | End: 2021-04-27

## 2020-05-28 NOTE — TELEPHONE ENCOUNTER
Last OV 12/12/19. Next OV not scheduled. Requested to return in 6 months. CMP 1/15/20. Attempted to call patient. Left message on voicemail to return call to office to schedule his 6 month appointment.

## 2020-06-30 RX ORDER — ROSUVASTATIN CALCIUM 40 MG/1
TABLET, COATED ORAL
Qty: 30 TABLET | Refills: 1 | Status: SHIPPED | OUTPATIENT
Start: 2020-06-30 | End: 2020-08-26

## 2020-07-06 PROBLEM — R05.9 COUGH: Status: ACTIVE | Noted: 2020-07-06

## 2020-07-28 ENCOUNTER — HOSPITAL ENCOUNTER (OUTPATIENT)
Age: 80
Discharge: HOME | End: 2020-07-28
Payer: MEDICARE

## 2020-07-28 ENCOUNTER — HOSPITAL ENCOUNTER (OUTPATIENT)
Dept: GENERAL RADIOLOGY | Age: 80
Discharge: HOME | End: 2020-07-28
Payer: MEDICARE

## 2020-07-28 PROCEDURE — 71046 X-RAY EXAM CHEST 2 VIEWS: CPT

## 2020-08-05 PROBLEM — R05.9 COUGH: Status: RESOLVED | Noted: 2020-07-06 | Resolved: 2020-08-05

## 2020-08-26 NOTE — TELEPHONE ENCOUNTER
Last ov 12/12/19  Pending appt overdue  Last refill torsemide 3/3/20 #60x3, crestor 6/30/20 #30x1  Last labs 7/9/19

## 2020-08-27 RX ORDER — TORSEMIDE 20 MG/1
TABLET ORAL
Qty: 60 TABLET | Refills: 0 | Status: SHIPPED | OUTPATIENT
Start: 2020-08-27 | End: 2020-11-20

## 2020-08-27 RX ORDER — ROSUVASTATIN CALCIUM 40 MG/1
TABLET, COATED ORAL
Qty: 30 TABLET | Refills: 0 | Status: SHIPPED | OUTPATIENT
Start: 2020-08-27 | End: 2020-09-22

## 2020-09-10 ENCOUNTER — OFFICE VISIT (OUTPATIENT)
Dept: ENT CLINIC | Age: 80
End: 2020-09-10
Payer: MEDICARE

## 2020-09-10 VITALS
HEIGHT: 70 IN | BODY MASS INDEX: 28.35 KG/M2 | TEMPERATURE: 97.6 F | HEART RATE: 118 BPM | SYSTOLIC BLOOD PRESSURE: 113 MMHG | DIASTOLIC BLOOD PRESSURE: 56 MMHG | WEIGHT: 198 LBS

## 2020-09-10 PROCEDURE — 99213 OFFICE O/P EST LOW 20 MIN: CPT | Performed by: OTOLARYNGOLOGY

## 2020-09-10 PROCEDURE — 69210 REMOVE IMPACTED EAR WAX UNI: CPT | Performed by: OTOLARYNGOLOGY

## 2020-09-10 NOTE — PROGRESS NOTES
osteoarthrosis of shoulder region    Partial symptomatic epilepsy with complex partial seizures, not intractable, without status epilepticus ([facility] [address]    TIA (transient ischemic attack)    Dysarthria    Hand pain    Leg abrasion    Knee pain, left    Slurred speech    Arthritis of left knee    History of total left knee replacement-7. 1.2016    Allergic rhinitis    Insomnia    Acute diastolic congestive heart failure (HCC)    SOB (shortness of breath)    Wheezing    Coronary artery disease involving coronary bypass graft of native heart with angina pectoris (HCC)    Mild reactive airways disease    Atrial fibrillation status post cardioversion (HCC)    Rupture of flexor tendon of right hand    Wound, open, finger, with tendon involvement    Cellulitis of right middle finger    Cellulitis of index finger    Tobacco abuse    Altered mental status    Olecranon bursitis of left elbow    Rash    Olecranon bursitis of right elbow    PAD (peripheral artery disease) ([facility] [address]    Hypothyroidism due to acquired atrophy of thyroid    Post concussive syndrome    Other chronic sinusitis    Stye    Sensorineural hearing loss, bilateral    Ear pressure, right    Tinnitus, left ear    Arm bruise, left, initial encounter    Fatigue     Past Surgical History:   Procedure Laterality Date    CAROTID ENDARTERECTOMY  3-1999    COLON SURGERY  4/15/05    Right colectomy to removal of cecum and terminal ileum    COLONOSCOPY      CORONARY ANGIOPLASTY WITH STENT PLACEMENT      CORONARY ARTERY BYPASS GRAFT  11/14/12    x 2 (LIMA-LAD, SVG-OM1)    EYE SURGERY      FRACTURE SURGERY Left 1959    left arm    KNEE ARTHROSCOPY      LIPOMA RESECTION  2009    Removal lipoma left posterior neck by Dr Franz Serum  9/13/05    Excision left arm primary melamoma and sentinel lymph node bs of left axilla by Dr Danny Ansari ADENOIDECTOMY      TOTAL KNEE ARTHROPLASTY Left 07/01/2016    Left Total Knee Replacement    UPPER GASTROINTESTINAL ENDOSCOPY       Family History   Problem Relation Age of Onset    Cancer Father     Lung Cancer Father     Cancer Sister     Cancer Brother     Lung Cancer Brother     Cancer Brother     Lung Cancer Brother     Cancer Brother      Social History     Socioeconomic History    Marital status:      Spouse name: Not on file    Number of children: 3    Years of education: Not on file    Highest education level: Not on file   Occupational History    Occupation: Self Employed   Social Needs    Financial resource strain: Not hard at all   Selene-Lloyd insecurity     Worry: Never true     Inability: Never true    Transportation needs     Medical: No     Non-medical: No   Tobacco Use    Smoking status: Current Every Day Smoker     Packs/day: 0.50     Years: 8.00     Pack years: 4.00     Types: Cigarettes    Smokeless tobacco: Never Used   Substance and Sexual Activity    Alcohol use:  Yes     Alcohol/week: 3.0 standard drinks     Types: 3 Glasses of wine per week     Comment: 3 glasses per week    Drug use: No    Sexual activity: Yes     Partners: Female   Lifestyle    Physical activity     Days per week: Not on file     Minutes per session: Not on file    Stress: Not on file   Relationships    Social connections     Talks on phone: Not on file     Gets together: Not on file     Attends Mu-ism service: Not on file     Active member of club or organization: Not on file     Attends meetings of clubs or organizations: Not on file     Relationship status: Not on file    Intimate partner violence     Fear of current or ex partner: Not on file     Emotionally abused: Not on file     Physically abused: Not on file     Forced sexual activity: Not on file   Other Topics Concern    Not on file   Social History Narrative    Not on file       DRUG/FOOD ALLERGIES: Patient has no known allergies. CURRENT MEDICATIONS  Prior to Admission medications    Medication Sig Start Date End Date Taking?  Authorizing Provider   rosuvastatin (CRESTOR) 40 MG tablet TAKE 1 TABLET BY MOUTH DAILY 8/27/20   Catalina Szymanski MD   torsemide (DEMADEX) 20 MG tablet TAKE 2 TABLETS BY MOUTH DAILY 8/27/20   Catalina Szymanski MD   levothyroxine (SYNTHROID) 25 MCG tablet TAKE 1 TABLET BY MOUTH DAILY 8/27/20   Kathi Blank MD   levETIRAcetam (KEPPRA) 750 MG tablet TAKE 1 TABLET BY MOUTH TWICE A DAY 7/27/20   Kathi Blank MD   predniSONE (DELTASONE) 10 MG tablet TAKE 2 TABLETS BY MOUTH TWICE A DAY FOR 4 DAYS THEN TAKE ONE TABLET TWICE A DAY FOR 4 DAYS THEN TAKE ONE TABLET IN THE MORNING FOR 4 DAYS 7/23/20   Kathi Blank MD   azithromycin (ZITHROMAX) 250 MG tablet TAKE 2 TABS TO START THEN 1 TAB DAILY DAYS 2 -  5 7/23/20   Kathi Blank MD   lisinopril (PRINIVIL;ZESTRIL) 5 MG tablet TAKE 1 TABLET BY MOUTH DAILY 5/28/20   Catalina Szymanski MD   isosorbide mononitrate (IMDUR) 30 MG extended release tablet TAKE 1 TABLET BY MOUTH DAILY 3/31/20   Kathi Blank MD   ketorolac (ACULAR) 0.5 % ophthalmic solution PLACE 1 DROP FOUR TIMES A DAY IN BOTH EYES FOR 7 DAYS 12/16/19   Kathi Blank MD   XARELTO 20 MG TABS tablet TAKE 1 TABLET BY MOUTH DAILY WITH SUPPER 12/5/19   Catalina Szymanski MD   amiodarone (CORDARONE) 200 MG tablet TAKE 0.5 TABLET BY MOUTH DAILY 11/4/19   Kathi Blank MD   diclofenac sodium 1 % GEL Apply 4 g topically 4 times daily 7/9/19   Kathi Blank MD   CPAP Machine MISC by Does not apply route    Historical Provider, MD   albuterol sulfate HFA (PROVENTIL HFA) 108 (90 Base) MCG/ACT inhaler Inhale 2 puffs into the lungs every 6 hours as needed for Wheezing 2/5/19   Ceci Mora MD   varenicline (CHANTIX STARTING MONTH PAK) 0.5 MG X 11 & 1 MG X 42 tablet Take by mouth. 12/20/18   Catalina Szymanski MD   diclofenac sodium (VOLTAREN) 1 % GEL Apply 4 g topically 4 times daily  Patient not taking: Reported on 12/12/2019 6/18/18 12/12/19  Jerry Ruiz MD   escitalopram (LEXAPRO) 10 MG tablet TAKE 1 TABLET BY MOUTH DAILY 4/30/18   Ray Gerber MD   magnesium (MAGNESIUM-OXIDE) 250 MG TABS tablet Take 250 mg by mouth daily    Historical Provider, MD   acetaminophen (TYLENOL) 500 MG tablet Take 500 mg by mouth every 6 hours as needed    Historical Provider, MD   loratadine (CLARITIN) 10 MG tablet Take 1 tablet by mouth daily 3/3/17   Ray Gerber MD       REVIEW OF SYSTEMS  The following systems were reviewed and revealed the following in addition to any already discussed in the HPI:    CONSTITUTIONAL: no weight loss, no fever, no night sweats, no chills  EYES: no vision changes, no blurry vision  EARS: Clogged up ears  NOSE: no epistaxis, no rhinorrhea  RESPIRATORY: no  Difficulty breathing, no shortness of breath  CV: no chest pain, no Peripheral vascular disease  HEME: No coagulation disorder, no Bleeding disorder  NEURO: no TIA or stroke-like symptoms  SKIN: No new rashes in the head and neck, no recent skin cancers  MOUTH: No new ulcers, no recent teeth infections  GASTROINTESTINAL: No diarrhea, stomach pain  PSYCH: No anxiety, no depression      PHYSICAL EXAM  BP (!) 113/56 (Site: Left Upper Arm, Position: Sitting, Cuff Size: Medium Adult)   Pulse 118   Temp 97.6 °F (36.4 °C) (Temporal)   Ht 5' 10\" (1.778 m)   Wt 198 lb (89.8 kg)   BMI 28.41 kg/m²     GENERAL: No Acute Distress, Alert and Oriented, no Hoarseness, strong voice  EYES: EOMI, Anti-icteric  HENT:   Head: Normocephalic and atraumatic. Face:  Symmetric, facial nerve intact, no sinus tenderness   ears: See below  Mouth/Oral Cavity:  normal lips, Uvula is midline, no mucosal lesions, no trismus, somewhat poor dentition, normal salivary quality/flow  Oropharynx/Larynx:  normal oropharynx, normal tonsils; normal larynx/nasopharynx on mirror exam  Nose:Normal external nasal appearance.   Anterior rhinoscopy shows a normal septum. Normal turbinates. Normal mucosa   NECK: Normal range of motion, no thyromegaly, trachea is midline, no lymphadenopathy, no neck masses, no crepitus  CHEST: Normal respiratory effort, no retractions, breathing comfortably  SKIN: No rashes, normal appearing skin, no evidence of skin lesions/tumors  Neuro:  cranial nerve II-XII intact; normal gait  Cardio:  no edema      PROCEDURE  Bilateral ear exam with cerumen removal  The right ear was visualized with the binocular scope. The patient had relatively narrow bony ear canals. I removed a denser and impaction with a García suction. Tympanic membrane intact with an aerated middle ear. On the left side was a similar exam finding with a dense cerumen impaction that I removed with alligator forceps. The underlying tympanic membrane was intact with an aerated middle ear        ASSESSMENT/PLAN  1. Bilateral impacted cerumen  Removed today in clinic. His hearing aids will probably make this a recurring issue given the shape of his ear canals. I am happy to clean these out. I would like to see him again in 6 months with a hearing test as I want to follow his hearing close. At this time it sounds as though the traditional hearing aids are helping him out. However his hearing was so bad that if he has much more hearing loss he might be a cochlear implant candidate. He is not really complaining any symptoms consistent with eustachian tube dysfunction at this time, so I do not think we need to readdress this    2. Sensorineural hearing loss (SNHL) of both ears  As above             I have performed a head and neck physical exam personally or was physically present during the key or critical portions of the service. Medical Decision Making:   The following items were considered in medical decision making:  Independent review of images  Review / order clinical lab tests  Review / order radiology tests  Decision to obtain old records

## 2020-09-22 RX ORDER — ROSUVASTATIN CALCIUM 40 MG/1
TABLET, COATED ORAL
Qty: 30 TABLET | Refills: 0 | Status: SHIPPED | OUTPATIENT
Start: 2020-09-22 | End: 2020-11-20

## 2020-10-21 LAB
FOLATE: >20 NG/ML (ref 4.78–24.2)
IRON SATURATION: 8 % (ref 20–50)
IRON: 31 UG/DL (ref 59–158)
TOTAL IRON BINDING CAPACITY: 399 UG/DL (ref 260–445)
VITAMIN B-12: 384 PG/ML (ref 211–911)

## 2020-10-23 LAB
ALBUMIN SERPL-MCNC: 3.3 G/DL (ref 3.1–4.9)
ALPHA-1-GLOBULIN: 0.3 G/DL (ref 0.2–0.4)
ALPHA-2-GLOBULIN: 0.8 G/DL (ref 0.4–1.1)
BETA GLOBULIN: 1 G/DL (ref 0.9–1.6)
GAMMA GLOBULIN: 0.5 G/DL (ref 0.6–1.8)
SPE/IFE INTERPRETATION: NORMAL
TOTAL PROTEIN: 5.8 G/DL (ref 6.4–8.2)

## 2020-10-29 ENCOUNTER — HOSPITAL ENCOUNTER (OUTPATIENT)
Age: 80
Discharge: HOME | End: 2020-10-29
Payer: MEDICARE

## 2020-11-02 ENCOUNTER — HOSPITAL ENCOUNTER (OUTPATIENT)
Age: 80
Setting detail: SPECIMEN
Discharge: HOME | End: 2020-11-02
Payer: MEDICARE

## 2020-11-02 LAB
24HR URINE VOLUME (ML): 2200 ML
PROTEIN 24 HOUR URINE: 0.09 G/24HR

## 2020-11-02 PROCEDURE — 84166 PROTEIN E-PHORESIS/URINE/CSF: CPT

## 2020-11-02 PROCEDURE — 84156 ASSAY OF PROTEIN URINE: CPT

## 2020-11-04 LAB — URINE ELECTROPHORESIS INTERP: NORMAL

## 2020-11-10 ENCOUNTER — HOSPITAL ENCOUNTER (EMERGENCY)
Age: 80
Discharge: HOME | End: 2020-11-11
Payer: MEDICARE

## 2020-11-10 VITALS
RESPIRATION RATE: 17 BRPM | HEIGHT: 71 IN | TEMPERATURE: 97.8 F | WEIGHT: 196.1 LBS | OXYGEN SATURATION: 97 % | SYSTOLIC BLOOD PRESSURE: 132 MMHG | HEART RATE: 75 BPM | BODY MASS INDEX: 27.45 KG/M2 | DIASTOLIC BLOOD PRESSURE: 63 MMHG

## 2020-11-10 PROBLEM — K12.2 INFECTION OF MOUTH: Status: ACTIVE | Noted: 2020-11-10

## 2020-11-10 PROBLEM — R53.1 WEAKNESS: Status: ACTIVE | Noted: 2020-11-10

## 2020-11-10 PROBLEM — R73.09 ABNORMAL GLUCOSE: Status: ACTIVE | Noted: 2020-11-10

## 2020-11-10 ASSESSMENT — PAIN DESCRIPTION - ONSET: ONSET: ON-GOING

## 2020-11-10 ASSESSMENT — PAIN SCALES - GENERAL: PAINLEVEL_OUTOF10: 6

## 2020-11-10 ASSESSMENT — PAIN - FUNCTIONAL ASSESSMENT: PAIN_FUNCTIONAL_ASSESSMENT: PREVENTS OR INTERFERES SOME ACTIVE ACTIVITIES AND ADLS

## 2020-11-10 ASSESSMENT — PAIN DESCRIPTION - FREQUENCY: FREQUENCY: INTERMITTENT

## 2020-11-10 ASSESSMENT — PAIN DESCRIPTION - ORIENTATION: ORIENTATION: LEFT;RIGHT

## 2020-11-10 ASSESSMENT — PAIN DESCRIPTION - PAIN TYPE: TYPE: ACUTE PAIN

## 2020-11-10 ASSESSMENT — PAIN DESCRIPTION - PROGRESSION: CLINICAL_PROGRESSION: NOT CHANGED

## 2020-11-19 ENCOUNTER — OFFICE VISIT (OUTPATIENT)
Dept: CARDIOLOGY CLINIC | Age: 80
End: 2020-11-19
Payer: MEDICARE

## 2020-11-19 VITALS
TEMPERATURE: 96.6 F | SYSTOLIC BLOOD PRESSURE: 125 MMHG | DIASTOLIC BLOOD PRESSURE: 77 MMHG | HEART RATE: 112 BPM | BODY MASS INDEX: 27.61 KG/M2 | HEIGHT: 71 IN | OXYGEN SATURATION: 99 % | WEIGHT: 197.2 LBS

## 2020-11-19 PROCEDURE — 99214 OFFICE O/P EST MOD 30 MIN: CPT | Performed by: INTERNAL MEDICINE

## 2020-11-19 PROCEDURE — 93000 ELECTROCARDIOGRAM COMPLETE: CPT | Performed by: INTERNAL MEDICINE

## 2020-11-19 PROCEDURE — G8417 CALC BMI ABV UP PARAM F/U: HCPCS | Performed by: INTERNAL MEDICINE

## 2020-11-19 PROCEDURE — G8484 FLU IMMUNIZE NO ADMIN: HCPCS | Performed by: INTERNAL MEDICINE

## 2020-11-19 PROCEDURE — 4040F PNEUMOC VAC/ADMIN/RCVD: CPT | Performed by: INTERNAL MEDICINE

## 2020-11-19 PROCEDURE — 4004F PT TOBACCO SCREEN RCVD TLK: CPT | Performed by: INTERNAL MEDICINE

## 2020-11-19 PROCEDURE — G8427 DOCREV CUR MEDS BY ELIG CLIN: HCPCS | Performed by: INTERNAL MEDICINE

## 2020-11-19 PROCEDURE — 1123F ACP DISCUSS/DSCN MKR DOCD: CPT | Performed by: INTERNAL MEDICINE

## 2020-11-19 NOTE — PROGRESS NOTES
[address] [facility] - Cardiology    Reason for the visit: CAD  Referring provider: Jenaro Burgess MD    CC: \"I feel good. \"     Stacy Tatum is a 78 y.o. patient with a past medical history significant for atrial fibrillation, seizure disorder and CAD with prior PCI and then CABG X 2 vessels in November of 2012. He has atrial fibrillation and underwent an ablation by my partner Dr. Amy Velázquez. Unfortunately he had a recurrence of this and was placed on amiodarone and cardioverted. He was readmited to [facility] on 10/28/16-11/1/16 with some volume overload and was in a-fib again. He returns to the office today in follow-up. He reports being seen in office with Dr. Jacque Gregorio last week and was told to go to the ED for not feeling well. He did not receive any care for sometime and left. He reports fatigue and some lower extremity swelling. He states he \"has good days and bad days\". His breathing has been \"okay at times and a problem at other times\". He reports medication compliance and is tolerating including Xarelto. Review of Systems:  Constitutional: No fatigue, weakness, night sweats or fever. HEENT: No new vision difficulties or ringing in the ears. Respiratory: No new SOB, PND, orthopnea or cough. Cardiovascular: See HPI   GI: No n/v, diarrhea, constipation, abdominal pain or changes in bowel habits. No melena, no hematochezia  : No urinary frequency, urgency, incontinence, hematuria or dysuria. Skin: No cyanosis or skin lesions. Musculoskeletal: No new muscle or joint pain. Neurological: No syncope or TIA-like symptoms.   Psychiatric: No anxiety, insomnia or depression     Current Outpatient Medications   Medication Sig Dispense Refill    Ferrous Sulfate (IRON PO) Take by mouth 2 times daily      rosuvastatin (CRESTOR) 40 MG tablet TAKE 1 TABLET BY MOUTH DAILY 30 tablet 0    torsemide (DEMADEX) 20 MG tablet TAKE 2 TABLETS BY MOUTH DAILY 60 tablet 0    levothyroxine (SYNTHROID) 25 MCG tablet TAKE 1 TABLET BY MOUTH DAILY 30 tablet 5    levETIRAcetam (KEPPRA) 750 MG tablet TAKE 1 TABLET BY MOUTH TWICE A DAY 60 tablet 5    predniSONE (DELTASONE) 10 MG tablet TAKE 2 TABLETS BY MOUTH TWICE A DAY FOR 4 DAYS THEN TAKE ONE TABLET TWICE A DAY FOR 4 DAYS THEN TAKE ONE TABLET IN THE MORNING FOR 4 DAYS 28 tablet 0    azithromycin (ZITHROMAX) 250 MG tablet TAKE 2 TABS TO START THEN 1 TAB DAILY DAYS 2 -  5 1 packet 0    lisinopril (PRINIVIL;ZESTRIL) 5 MG tablet TAKE 1 TABLET BY MOUTH DAILY 90 tablet 3    isosorbide mononitrate (IMDUR) 30 MG extended release tablet TAKE 1 TABLET BY MOUTH DAILY 90 tablet 3    ketorolac (ACULAR) 0.5 % ophthalmic solution PLACE 1 DROP FOUR TIMES A DAY IN BOTH EYES FOR 7 DAYS 1 Bottle 0    XARELTO 20 MG TABS tablet TAKE 1 TABLET BY MOUTH DAILY WITH SUPPER 90 tablet 3    amiodarone (CORDARONE) 200 MG tablet TAKE 0.5 TABLET BY MOUTH DAILY 60 tablet 5    CPAP Machine MISC by Does not apply route      albuterol sulfate HFA (PROVENTIL HFA) 108 (90 Base) MCG/ACT inhaler Inhale 2 puffs into the lungs every 6 hours as needed for Wheezing 1 Inhaler 3    varenicline (CHANTIX STARTING MONTH PAK) 0.5 MG X 11 & 1 MG X 42 tablet Take by mouth. 1 each 0    escitalopram (LEXAPRO) 10 MG tablet TAKE 1 TABLET BY MOUTH DAILY 90 tablet 3    magnesium (MAGNESIUM-OXIDE) 250 MG TABS tablet Take 250 mg by mouth daily      acetaminophen (TYLENOL) 500 MG tablet Take 500 mg by mouth every 6 hours as needed      loratadine (CLARITIN) 10 MG tablet Take 1 tablet by mouth daily 30 tablet 3     No current facility-administered medications for this visit.         Allergies   Allergen Reactions    Seasonal        Physical Exam:  Vitals:    11/19/20 1444   BP: 125/77   Pulse: 112   Temp: 96.6 °F (35.9 °C)   SpO2: 99%   Weight: 197 lb 3.2 oz (89.4 kg)   Height: 5' 10.5\" (1.791 m)        Wt Readings from Last 2 Encounters:   11/19/20 197 lb 3.2 oz (89.4 kg)   11/10/20 196 lb 1.6 oz (89 kg)     General Appearance:  Alert, cooperative, no distress, appears stated age   Head:  Normocephalic, without obvious abnormality, atraumatic   Eyes:  PERRL, conjunctiva/corneas clear       Nose: Nares normal, no drainage or sinus tenderness   Throat: Lips, mucosa, and tongue normal   Neck: Supple, symmetrical, trachea midline, no adenopathy, thyroid: not enlarged, symmetric, no tenderness/mass/nodules, no carotid bruit or JVD       Lungs:   Clear to auscultation bilaterally, respirations unlabored   Heart:  Regular but lakesha, normal S1/S2, no M/R/G      Abdomen:   Soft, non-tender, bowel sounds active all four quadrants,  no masses, no organomegaly   Extremities: Extremities normal, atraumatic, no cyanosis or edema   Pulses: Carotid      L   2      R2  Radial       L    2     R2     Skin: Petechial rash on bilateral lower legs above the sock line up to the knee   Pysch: Normal mood and affect   Neurologic: Normal     Pulses: Carotid      L   2     R2  Radial       L   2     R2  Femoral    L   2     R2  Popliteal    L   2     R2  DP            L   1 +doppler      R0 +doppler  PT             L   1 +dopple     R1 +doppler(weak)           Lab Results   Component Value Date    TRIG 129 10/16/2020    HDL 38 10/16/2020    LDLCALC 56 10/16/2020    LABVLDL 26 10/16/2020     Lab Results   Component Value Date     10/16/2020    K 4.9 10/16/2020    K 4.6 10/30/2018    BUN 12 10/16/2020    CREATININE 1.1 10/16/2020    No components found for: HGBA1C    Lab Results   Component Value Date    TSH 1.73 10/16/2020     ECG 4/12/18: Sinus Bradycardia, QTc 433ms          ECG 6/20/19: Sinus bradycardia  ECG 12/12/19: Sinus rhythm   ECG 11/19/20: Atrial fibrillation       Procedures:     [facility] 11/13/2012 (Dr. Magaly Martin)   LV wall motion: Normal LV wall motion  LV gram - 60% EF   Normal EDP  LMCA distal 75% lesion with disruption and possible mobile plaque   Proximal 80% LAD densely calcified lesion   Circumflex: entire artery has luminal irregularities, non-dominate  RCA: Proximal widely patent in previously placed stent; entire vessel has luminal irregularities; Mid 30% tapering lesion with arteriomegaly. CABG 11/14/2012 (Dr. Magan Yeager)   1. Transesophageal echocardiogram.  2.  Endoscopic vein harvesting. 3.  Urgent coronary artery bypass graft x2 (left internal mammary artery to  left anterior descending, saphenous vein graft to obtuse marginal 1). Imaging:     Echo 8/20/14  Normal left ventricular size and systolic function. Ejection fraction is visually estimated to be 50%. Normal right ventricular size and function. The left atrium is mildly dilated. There is mild tricuspid regurgitation with RVSP estimated at 32 mmHg. CT head wo contrast 10/10/2015  No acute intracranial abnormality. Carotid Duplex 10/15/2015  The right internal carotid artery appears to have a 1-15% diameter reducing   stenosis based on velocity criteria. The right vertebral artery demonstrates normal antegrade flow. The left internal carotid artery appears to have a 1-15% diameter reducing   stenosis based on velocity criteria. The left vertebral artery demonstrates normal antegrade flow. Echo 10/15/15  Left ventricle size is normal. Normal left ventricular wall thickness. Ejection fraction is visually estimated to be 55-60 %. No regional wall motion abnormalities are noted. Normal diastolic function. The aortic valve is thickened/calcified with decreased leaflet mobility. The aortic valve area is calculated at 1.4 cm2 with a maximum pressure  gradient of 28 mmHg and a mean pressure gradient of 10 mmHg. This is c/w mild aortic stenosis. Mild aortic regurgitation is present. There is mild tricuspid regurgitation with RVSP estimated at 34 mmHg. The right atrium is mildly dilated. Echo 10/28/16  LVH with normal systolic function. EF  70%. The left atrium is normal in size. Trace to mild mitral regurgitation is present.   Mild aortic regurgitation is present. Normal right ventricular size and function. Carotid duplex 7/12/19  The right internal carotid artery appears to have a <50% diameter reducing    stenosis based on velocity criteria.    The right vertebral artery demonstrates normal antegrade flow.    Previous left carotid endarterectomy.    The left internal carotid artery demonstrates no significant stenosis.    The left vertebral artery demonstrates normal antegrade flow. Assessment:  1. Chronic diastolic CHF, class 2  2. Atrial fibrillation, paroxysmal  3. PAD  4. CAD of native coronary arteries without angina-CABG 2012  5. Hyperlipidemia with goal LDL<70mg/dL  6. EDSON with CPAP therapy  7. Nicotine addiction     Plan:   He does present in atrial fibrillation and admits to feeling poorly recently. I recommend the patient pursue a direct current cardioversion. The risks, benefits and alternatives to the procedure were discussed with the patient. The risks include but are not limited to: stroke, respiratory failure, arrhythmia and death. He will increase his amiodarone to 200mg bid for the next week. He should continue to take his Xarelto 20mg daily. I will see him in the office following procedure. This note was scribed in the presence of Benedetta Severs, MD by [employer], RN. Physician Attestation:  The scribes documentation has been prepared under my direction and personally reviewed by me in its entirety. I, Dr. Brian Sutton personally performed the services described in this documentation as scribed by my RN in my presence, and I confirm that the note above accurately reflects all work, treatment, procedures, and medical decision making performed by me.

## 2020-11-20 RX ORDER — AMIODARONE HYDROCHLORIDE 200 MG/1
TABLET ORAL
Qty: 60 TABLET | Refills: 5 | Status: SHIPPED | OUTPATIENT
Start: 2020-11-20 | End: 2020-11-25 | Stop reason: SDUPTHER

## 2020-11-20 RX ORDER — TORSEMIDE 20 MG/1
TABLET ORAL
Qty: 60 TABLET | Refills: 0 | Status: SHIPPED | OUTPATIENT
Start: 2020-11-20 | End: 2020-12-29

## 2020-11-20 NOTE — TELEPHONE ENCOUNTER
Pt's daughter Dominic Reid, called in asking if the prescription has been filled. I informed her that we just received the refill request today and she would like it filled today.  Ana M also would like a call [facility]      Ana M can be reach [phone]

## 2020-11-23 RX ORDER — ROSUVASTATIN CALCIUM 40 MG/1
TABLET, COATED ORAL
Qty: 30 TABLET | Refills: 6 | Status: SHIPPED | OUTPATIENT
Start: 2020-11-23 | End: 2021-07-13

## 2020-11-23 RX ORDER — RIVAROXABAN 20 MG/1
TABLET, FILM COATED ORAL
Qty: 90 TABLET | Refills: 2 | Status: SHIPPED | OUTPATIENT
Start: 2020-11-23 | End: 2021-10-18

## 2020-11-25 ENCOUNTER — HOSPITAL ENCOUNTER (OUTPATIENT)
Dept: CARDIAC CATH/INVASIVE PROCEDURES | Age: 80
Discharge: HOME | End: 2020-11-25
Payer: MEDICARE

## 2020-11-25 VITALS
HEART RATE: 86 BPM | BODY MASS INDEX: 27.3 KG/M2 | DIASTOLIC BLOOD PRESSURE: 76 MMHG | WEIGHT: 195 LBS | RESPIRATION RATE: 18 BRPM | OXYGEN SATURATION: 98 % | TEMPERATURE: 97.2 F | SYSTOLIC BLOOD PRESSURE: 101 MMHG | HEIGHT: 71 IN

## 2020-11-25 LAB
EKG ATRIAL RATE: 53 BPM
EKG DIAGNOSIS: NORMAL
EKG P AXIS: 52 DEGREES
EKG P-R INTERVAL: 228 MS
EKG Q-T INTERVAL: 496 MS
EKG QRS DURATION: 94 MS
EKG QTC CALCULATION (BAZETT): 465 MS
EKG R AXIS: -4 DEGREES
EKG T AXIS: -37 DEGREES
EKG VENTRICULAR RATE: 53 BPM

## 2020-11-25 PROCEDURE — 93005 ELECTROCARDIOGRAM TRACING: CPT | Performed by: INTERNAL MEDICINE

## 2020-11-25 PROCEDURE — 92960 CARDIOVERSION ELECTRIC EXT: CPT | Performed by: INTERNAL MEDICINE

## 2020-11-25 PROCEDURE — 2500000003 HC RX 250 WO HCPCS

## 2020-11-25 PROCEDURE — 92960 CARDIOVERSION ELECTRIC EXT: CPT

## 2020-11-25 PROCEDURE — 93010 ELECTROCARDIOGRAM REPORT: CPT | Performed by: INTERNAL MEDICINE

## 2020-11-25 RX ORDER — SODIUM CHLORIDE 9 MG/ML
INJECTION, SOLUTION INTRAVENOUS CONTINUOUS
Status: DISCONTINUED | OUTPATIENT
Start: 2020-11-25 | End: 2020-11-26 | Stop reason: HOSPADM

## 2020-11-25 RX ORDER — SODIUM CHLORIDE 0.9 % (FLUSH) 0.9 %
10 SYRINGE (ML) INJECTION EVERY 12 HOURS SCHEDULED
Status: DISCONTINUED | OUTPATIENT
Start: 2020-11-25 | End: 2020-11-26 | Stop reason: HOSPADM

## 2020-11-25 RX ORDER — SODIUM CHLORIDE 0.9 % (FLUSH) 0.9 %
10 SYRINGE (ML) INJECTION PRN
Status: DISCONTINUED | OUTPATIENT
Start: 2020-11-25 | End: 2020-11-26 | Stop reason: HOSPADM

## 2020-11-25 RX ORDER — AMIODARONE HYDROCHLORIDE 200 MG/1
200 TABLET ORAL DAILY
Qty: 60 TABLET | Refills: 5 | Status: SHIPPED | OUTPATIENT
Start: 2020-11-25 | End: 2021-06-24 | Stop reason: ALTCHOICE

## 2020-11-25 RX ORDER — SODIUM CHLORIDE 0.9 % (FLUSH) 0.9 %
10 SYRINGE (ML) INJECTION PRN
Status: DISCONTINUED | OUTPATIENT
Start: 2020-11-25 | End: 2020-11-25 | Stop reason: ALTCHOICE

## 2020-11-25 RX ORDER — SODIUM CHLORIDE 0.9 % (FLUSH) 0.9 %
10 SYRINGE (ML) INJECTION EVERY 12 HOURS SCHEDULED
Status: DISCONTINUED | OUTPATIENT
Start: 2020-11-25 | End: 2020-11-25 | Stop reason: ALTCHOICE

## 2020-11-25 NOTE — H&P
CC: \"I feel good. \"      Tessie Elmore is a 78 y.o. patient with a past medical history significant for atrial fibrillation, seizure disorder and CAD with prior PCI and then CABG X 2 vessels in November of 2012. He has atrial fibrillation and underwent an ablation by my partner Dr. Brooks Campbell. Unfortunately he had a recurrence of this and was placed on amiodarone and cardioverted. He was readmited to [facility] on 10/28/16-11/1/16 with some volume overload and was in a-fib again. He returns to the office today in follow-up.     He reports being seen in office with Dr. Damon Last last week and was told to go to the ED for not feeling well. He did not receive any care for sometime and left. He reports fatigue and some lower extremity swelling. He states he \"has good days and bad days\". His breathing has been \"okay at times and a problem at other times\". He reports medication compliance and is tolerating including Xarelto.      Review of Systems:  Constitutional: No fatigue, weakness, night sweats or fever. HEENT: No new vision difficulties or ringing in the ears. Respiratory: No new SOB, PND, orthopnea or cough. Cardiovascular: See HPI   GI: No n/v, diarrhea, constipation, abdominal pain or changes in bowel habits. No melena, no hematochezia  : No urinary frequency, urgency, incontinence, hematuria or dysuria. Skin: No cyanosis or skin lesions. Musculoskeletal: No new muscle or joint pain. Neurological: No syncope or TIA-like symptoms.   Psychiatric: No anxiety, insomnia or depression     Current Facility-Administered Medications          Current Outpatient Medications   Medication Sig Dispense Refill    Ferrous Sulfate (IRON PO) Take by mouth 2 times daily        rosuvastatin (CRESTOR) 40 MG tablet TAKE 1 TABLET BY MOUTH DAILY 30 tablet 0    torsemide (DEMADEX) 20 MG tablet TAKE 2 TABLETS BY MOUTH DAILY 60 tablet 0    levothyroxine (SYNTHROID) 25 MCG tablet TAKE 1 TABLET BY MOUTH DAILY 30 tablet 5    levETIRAcetam (KEPPRA) 750 MG tablet TAKE 1 TABLET BY MOUTH TWICE A DAY 60 tablet 5    predniSONE (DELTASONE) 10 MG tablet TAKE 2 TABLETS BY MOUTH TWICE A DAY FOR 4 DAYS THEN TAKE ONE TABLET TWICE A DAY FOR 4 DAYS THEN TAKE ONE TABLET IN THE MORNING FOR 4 DAYS 28 tablet 0    azithromycin (ZITHROMAX) 250 MG tablet TAKE 2 TABS TO START THEN 1 TAB DAILY DAYS 2 -  5 1 packet 0    lisinopril (PRINIVIL;ZESTRIL) 5 MG tablet TAKE 1 TABLET BY MOUTH DAILY 90 tablet 3    isosorbide mononitrate (IMDUR) 30 MG extended release tablet TAKE 1 TABLET BY MOUTH DAILY 90 tablet 3    ketorolac (ACULAR) 0.5 % ophthalmic solution PLACE 1 DROP FOUR TIMES A DAY IN BOTH EYES FOR 7 DAYS 1 Bottle 0    XARELTO 20 MG TABS tablet TAKE 1 TABLET BY MOUTH DAILY WITH SUPPER 90 tablet 3    amiodarone (CORDARONE) 200 MG tablet TAKE 0.5 TABLET BY MOUTH DAILY 60 tablet 5    CPAP Machine MISC by Does not apply route        albuterol sulfate HFA (PROVENTIL HFA) 108 (90 Base) MCG/ACT inhaler Inhale 2 puffs into the lungs every 6 hours as needed for Wheezing 1 Inhaler 3    varenicline (CHANTIX STARTING MONTH PAK) 0.5 MG X 11 & 1 MG X 42 tablet Take by mouth.  1 each 0    escitalopram (LEXAPRO) 10 MG tablet TAKE 1 TABLET BY MOUTH DAILY 90 tablet 3    magnesium (MAGNESIUM-OXIDE) 250 MG TABS tablet Take 250 mg by mouth daily        acetaminophen (TYLENOL) 500 MG tablet Take 500 mg by mouth every 6 hours as needed        loratadine (CLARITIN) 10 MG tablet Take 1 tablet by mouth daily 30 tablet 3      No current facility-administered medications for this visit.                 Allergies   Allergen Reactions    Seasonal           Physical Exam:  Vitals       Vitals:     11/19/20 1444   BP: 125/77   Pulse: 112   Temp: 96.6 °F (35.9 °C)   SpO2: 99%   Weight: 197 lb 3.2 oz (89.4 kg)   Height: 5' 10.5\" (1.791 m)               Wt Readings from Last 2 Encounters:   11/19/20 197 lb 3.2 oz (89.4 kg)   11/10/20 196 lb 1.6 oz (89 kg)      General Appearance: Alert, cooperative, no distress, appears stated age   Head:  Normocephalic, without obvious abnormality, atraumatic   Eyes:  PERRL, conjunctiva/corneas clear         Nose: Nares normal, no drainage or sinus tenderness   Throat: Lips, mucosa, and tongue normal   Neck: Supple, symmetrical, trachea midline, no adenopathy, thyroid: not enlarged, symmetric, no tenderness/mass/nodules, no carotid bruit or JVD         Lungs:   Clear to auscultation bilaterally, respirations unlabored   Heart:  Regular but lakesha, normal S1/S2, no M/R/G      Abdomen:   Soft, non-tender, bowel sounds active all four quadrants,  no masses, no organomegaly   Extremities: Extremities normal, atraumatic, no cyanosis or edema   Pulses: Carotid      L   2      R2  Radial       L    2     R2      Skin: Petechial rash on bilateral lower legs above the sock line up to the knee   Pysch: Normal mood and affect   Neurologic: Normal      Pulses: Carotid      L   2     R2  Radial       L   2     R2  Femoral    L   2     R2  Popliteal    L   2     R2  DP            L   1 +doppler      R0 +doppler  PT             L   1 +dopple     R1 +doppler(weak)               Lab Results   Component Value Date     TRIG 129 10/16/2020     HDL 38 10/16/2020     LDLCALC 56 10/16/2020     LABVLDL 26 10/16/2020            Lab Results   Component Value Date      10/16/2020     K 4.9 10/16/2020     K 4.6 10/30/2018     BUN 12 10/16/2020     CREATININE 1.1 10/16/2020    No components found for: HGBA1C           Lab Results   Component Value Date     TSH 1.73 10/16/2020      ECG 4/12/18: Sinus Bradycardia, QTc 433ms          ECG 6/20/19: Sinus bradycardia  ECG 12/12/19: Sinus rhythm   ECG 11/19/20: Atrial fibrillation         Procedures:      [facility] 11/13/2012 (Dr. Morro Cantrell)   LV wall motion: Normal LV wall motion  LV gram - 60% EF   Normal EDP  LMCA distal 75% lesion with disruption and possible mobile plaque   Proximal 80% LAD densely calcified lesion   Circumflex: entire artery has luminal irregularities, non-dominate  RCA: Proximal widely patent in previously placed stent; entire vessel has luminal irregularities; Mid 30% tapering lesion with arteriomegaly.      CABG 11/14/2012 (Dr. Joey Nelson)   1.  Transesophageal echocardiogram.  2.  Endoscopic vein harvesting. 3.  Urgent coronary artery bypass graft x2 (left internal mammary artery to  left anterior descending, saphenous vein graft to obtuse marginal 1).       Imaging:      Echo 8/20/14  Normal left ventricular size and systolic function. Ejection fraction is visually estimated to be 50%. Normal right ventricular size and function. The left atrium is mildly dilated. There is mild tricuspid regurgitation with RVSP estimated at 32 mmHg.     CT head wo contrast 10/10/2015  No acute intracranial abnormality.      Carotid Duplex 10/15/2015  The right internal carotid artery appears to have a 1-15% diameter reducing   stenosis based on velocity criteria. The right vertebral artery demonstrates normal antegrade flow. The left internal carotid artery appears to have a 1-15% diameter reducing   stenosis based on velocity criteria. The left vertebral artery demonstrates normal antegrade flow.      Echo 10/15/15  Left ventricle size is normal. Normal left ventricular wall thickness. Ejection fraction is visually estimated to be 55-60 %. No regional wall motion abnormalities are noted. Normal diastolic function. The aortic valve is thickened/calcified with decreased leaflet mobility. The aortic valve area is calculated at 1.4 cm2 with a maximum pressure  gradient of 28 mmHg and a mean pressure gradient of 10 mmHg. This is c/w mild aortic stenosis. Mild aortic regurgitation is present. There is mild tricuspid regurgitation with RVSP estimated at 34 mmHg. The right atrium is mildly dilated.     Echo 10/28/16  LVH with normal systolic function. EF  70%. The left atrium is normal in size.   Trace to mild mitral regurgitation is present. Mild aortic regurgitation is present. Normal right ventricular size and function.     Carotid duplex 7/12/19  The right internal carotid artery appears to have a <50% diameter reducing    stenosis based on velocity criteria.    The right vertebral artery demonstrates normal antegrade flow.    Previous left carotid endarterectomy.    The left internal carotid artery demonstrates no significant stenosis.    The left vertebral artery demonstrates normal antegrade flow.         Assessment:  1. Chronic diastolic CHF, class 2  2. Atrial fibrillation, paroxysmal  3. PAD  4. CAD of native coronary arteries without angina-CABG 2012  5. Hyperlipidemia with goal LDL<70mg/dL  6. EDSON with CPAP therapy  7. Nicotine addiction      Plan:   He does present in atrial fibrillation and admits to feeling poorly recently. I recommend the patient pursue a direct current cardioversion. The risks, benefits and alternatives to the procedure were discussed with the patient. The risks include but are not limited to: stroke, respiratory failure, arrhythmia and death. He will increase his amiodarone to 200mg bid for the next week. He should continue to take his Xarelto 20mg daily.     I will see him in the office following procedure. Vitals:    11/25/20 0737   BP: 101/76   Pulse: 86   Resp: 18   Temp: 97.2 °F (36.2 °C)   SpO2: 98%     I have reviewed the most recent H&P for this patient and independently examined the patient. There have been no changes. We will proceed with the planned procedure.     Maira Rizzo MD

## 2020-11-26 NOTE — PROCEDURES
[address]                                 PROCEDURE NOTE    PATIENT NAME: Harry Mar                      :        1940  MED REC NO:   [id number]                          ROOM:  ACCOUNT NO:   [de-identified]                           ADMIT DATE: 2020  PROVIDER:     Jackelyn Duff MD    [id number] REPORT    DATE OF PROCEDURE:  2020    INDICATIONS FOR PROCEDURE:  Atrial fibrillation. PROCEDURE:  The risks and benefits of the procedure were explained to  the patient and informed consent was obtained. He was brought to the  cardiac catheterization lab recovery area and placed in the supine  position. Emergency equipment was in place. He had an ASA grade of II  and a Mallampati score of II. He received 50 mg of IV Brevital for deep  sedation. When deep sedation was achieved, he received a single  200-joule synchronized cardioversion shock. This successfully converted  him to a normal sinus rhythm initially. He did progress to an SVT at a  rate of approximately 105 beats per minute. He received a total of 10  mg of IV Lopressor and slowed down to a sinus rhythm. The patient recovered with no neurologic deficits. There were no  complications from the procedure. Total duration of sedation was 10  minutes. There was no blood loss with this procedure. Vital signs were  monitored during the sedation. Remained stable. FINDINGS:  Successful synchronized cardioversion with a single 200-joule  biphasic cardioversion shock. The patient converted from atrial  fibrillation to a normal sinus rhythm.         Mariela Morgan MD    D: 2020 14:32:07       T: 2020 14:39:07     [id number]  Job#: [id number]     Doc#: [id number]    CC:  Mario Murphy MD

## 2020-12-02 ENCOUNTER — TELEPHONE (OUTPATIENT)
Dept: CARDIOLOGY CLINIC | Age: 80
End: 2020-12-02

## 2020-12-02 NOTE — TELEPHONE ENCOUNTER
Called Sidra Baig and suggested she continue to monitor patient. If worsening symptoms, proceed to ER. Otherwise, we will see him in office tomorrow at 130 PM for further evaluation. She v/u.

## 2020-12-02 NOTE — TELEPHONE ENCOUNTER
Freida     Please call the patient or [facility]. Discussed with Dr. Grupo Romero and Jonny SAdolph should reduce his dose of amiodarone to 200 mg once daily. If he is continuing to have these symptoms, he needs to be seen by PCP or phone visit with them. His in-office visit for tomorrow with Dr. Grupo Romero should be canceled and can be made a phone visit.

## 2020-12-02 NOTE — TELEPHONE ENCOUNTER
Spoke to pt. He will hold his dose of Amiodarone this evening. Pt was told to call us tomorrow if he is still not feeling well. Pt was also told to proceed to ER if symptoms got worse.      Rosario Gore, , changed visit to phone visit for tomorrow

## 2020-12-02 NOTE — TELEPHONE ENCOUNTER
Spoke to Joann about pt:  Tired, sweaty, chills, not feeling well  BP: does not have a unit   135/84   HR  103  Feeling tightness in his chest.    TEMP: not able     Feeling worse than before the cardioversion. Do you want to see him in the office with the symptoms:  Sweaty and chills?

## 2020-12-02 NOTE — TELEPHONE ENCOUNTER
Asa Held called in this afternoon stating that Adolph COVINGTON had a Cardioversion done on 11/25 and today he has been sweaty, and tired and just flat out not feeling good. They would like to talk to Dr. Keysha Joshi. I did schedule him sooner with Dr. Keysha Joshi for tomorrow 12/3, but they still would like a call back.     You can reach Asa Held at #[phone]

## 2020-12-03 NOTE — TELEPHONE ENCOUNTER
Called Yvette Hollingsworth to get an update on patient. She feels that his symptoms are viral so pt is scheduled to see PCP today. VV with Dr. Tyshawn Stewart will be cancelled for today and patient will f/u as previously scheduled on 12/10/20 at 345 pm [facility]).

## 2020-12-04 ENCOUNTER — HOSPITAL ENCOUNTER (OUTPATIENT)
Dept: GENERAL RADIOLOGY | Age: 80
Discharge: HOME | End: 2020-12-04
Payer: MEDICARE

## 2020-12-04 ENCOUNTER — TELEPHONE (OUTPATIENT)
Dept: FAMILY MEDICINE CLINIC | Age: 80
End: 2020-12-04

## 2020-12-04 ENCOUNTER — OFFICE VISIT (OUTPATIENT)
Dept: PRIMARY CARE CLINIC | Age: 80
End: 2020-12-04
Payer: MEDICARE

## 2020-12-04 ENCOUNTER — HOSPITAL ENCOUNTER (OUTPATIENT)
Age: 80
Discharge: HOME | End: 2020-12-04
Payer: MEDICARE

## 2020-12-04 PROCEDURE — G8417 CALC BMI ABV UP PARAM F/U: HCPCS | Performed by: NURSE PRACTITIONER

## 2020-12-04 PROCEDURE — 71046 X-RAY EXAM CHEST 2 VIEWS: CPT

## 2020-12-04 PROCEDURE — G8428 CUR MEDS NOT DOCUMENT: HCPCS | Performed by: NURSE PRACTITIONER

## 2020-12-04 PROCEDURE — 99211 OFF/OP EST MAY X REQ PHY/QHP: CPT | Performed by: NURSE PRACTITIONER

## 2020-12-04 NOTE — TELEPHONE ENCOUNTER
I called and spoke with pt  Still fatigued, getting SOB with exertion but is ok at rest. No CP  Yesterday his vitals were ok  Got COVID swab today and results pending  Ordering CXR  Discussed if any progression of symptoms he should go to ER

## 2020-12-05 ENCOUNTER — TELEPHONE (OUTPATIENT)
Dept: FAMILY MEDICINE CLINIC | Age: 80
End: 2020-12-05

## 2020-12-05 RX ORDER — DOXYCYCLINE HYCLATE 100 MG
100 TABLET ORAL 2 TIMES DAILY
Qty: 14 TABLET | Refills: 0 | Status: SHIPPED | OUTPATIENT
Start: 2020-12-05 | End: 2020-12-12

## 2020-12-05 RX ORDER — PREDNISONE 10 MG/1
TABLET ORAL
Qty: 20 TABLET | Refills: 0 | Status: SHIPPED | OUTPATIENT
Start: 2020-12-05 | End: 2021-02-02 | Stop reason: CLARIF

## 2020-12-05 NOTE — TELEPHONE ENCOUNTER
Called Ray  Symptoms are stable.  Still very fatigued  Discussed CXR findings  Sending in doxy and prednisone to cover for COPD exacerbation  Stay at home and await COVID results  Call or go to ED for trouble breathing

## 2020-12-06 LAB — SARS-COV-2, NAA: NOT DETECTED

## 2020-12-10 ENCOUNTER — OFFICE VISIT (OUTPATIENT)
Dept: CARDIOLOGY CLINIC | Age: 80
End: 2020-12-10
Payer: MEDICARE

## 2020-12-10 VITALS
TEMPERATURE: 97 F | BODY MASS INDEX: 26.66 KG/M2 | OXYGEN SATURATION: 97 % | DIASTOLIC BLOOD PRESSURE: 57 MMHG | HEART RATE: 109 BPM | SYSTOLIC BLOOD PRESSURE: 118 MMHG | HEIGHT: 70 IN | WEIGHT: 186.2 LBS

## 2020-12-10 PROCEDURE — 99214 OFFICE O/P EST MOD 30 MIN: CPT | Performed by: INTERNAL MEDICINE

## 2020-12-10 PROCEDURE — 1123F ACP DISCUSS/DSCN MKR DOCD: CPT | Performed by: INTERNAL MEDICINE

## 2020-12-10 PROCEDURE — G8484 FLU IMMUNIZE NO ADMIN: HCPCS | Performed by: INTERNAL MEDICINE

## 2020-12-10 PROCEDURE — G8427 DOCREV CUR MEDS BY ELIG CLIN: HCPCS | Performed by: INTERNAL MEDICINE

## 2020-12-10 PROCEDURE — 4004F PT TOBACCO SCREEN RCVD TLK: CPT | Performed by: INTERNAL MEDICINE

## 2020-12-10 PROCEDURE — G8417 CALC BMI ABV UP PARAM F/U: HCPCS | Performed by: INTERNAL MEDICINE

## 2020-12-10 PROCEDURE — 93000 ELECTROCARDIOGRAM COMPLETE: CPT | Performed by: INTERNAL MEDICINE

## 2020-12-10 PROCEDURE — 4040F PNEUMOC VAC/ADMIN/RCVD: CPT | Performed by: INTERNAL MEDICINE

## 2020-12-10 NOTE — PROGRESS NOTES
[address] [facility] - Cardiology    CC: \"I had trouble sleeping\"     HPI:   Mechelle Campo is a 78 y.o. patient with a past medical history significant for atrial fibrillation, seizure disorder and CAD with prior PCI and then CABG X 2 vessels in November of 2012. He has atrial fibrillation and underwent an ablation by my partner Dr. Lurdes Ramirez. Unfortunately he had a recurrence of this and was placed on amiodarone and cardioverted. He was readmited to [facility] on 10/28/16-11/1/16 with some volume overload and was in a-fib again. He was found in atrial fibrillation while in the office and underwent successful cardioversion on 11/25/20. He returns to the office today in follow-up. The few days following his cardioversion, he began to feel lethargic and was experiencing fever and chills. He was felt to have a viral infection and was placed on antibiotics. He admits to some personal stress recently. The past few days he has felt well. His breathing has been comfortable without shortness of breath. He states this improved following cardioversion. He reports experiencing black stools for a month but this has now resolved. He denies chest pain with rest or exertion. Review of Systems:  Constitutional: No fatigue, weakness, night sweats or fever. HEENT: No new vision difficulties or ringing in the ears. Respiratory: No new SOB, PND, orthopnea or cough. Cardiovascular: See HPI   GI: No n/v, diarrhea, constipation, abdominal pain or changes in bowel habits. No melena, no hematochezia  : No urinary frequency, urgency, incontinence, hematuria or dysuria. Skin: No cyanosis or skin lesions. Musculoskeletal: No new muscle or joint pain. Neurological: No syncope or TIA-like symptoms.   Psychiatric: No anxiety, insomnia or depression     Current Outpatient Medications   Medication Sig Dispense Refill    mirtazapine (REMERON) 15 MG tablet Take 1 tablet by mouth nightly 30 tablet 5    doxycycline hyclate (VIBRA-TABS) 100 MG tablet Take 1 tablet by mouth 2 times daily for 7 days 14 tablet 0    predniSONE (DELTASONE) 10 MG tablet Take 4 tabs by mouth daily for 2 days, 3 tabs for 2 days, 2 tabs for 2 days, 1 tab for 2 days 20 tablet 0    pantoprazole (PROTONIX) 40 MG tablet Take 1 tablet by mouth 2 times daily 60 tablet 0    amiodarone (CORDARONE) 200 MG tablet Take 1 tablet by mouth daily TAKE ONE TABLET BY MOUTH EVERY DAY 60 tablet 5    XARELTO 20 MG TABS tablet TAKE ONE (1) TABLET BY MOUTH DAILY WITH SUPPER  90 tablet 2    rosuvastatin (CRESTOR) 40 MG tablet TAKE ONE (1) TABLET BY MOUTH DAILY  30 tablet 6    torsemide (DEMADEX) 20 MG tablet TAKE TWO (2) TABLETS BY MOUTH DAILY  60 tablet 0    Ferrous Sulfate (IRON PO) Take by mouth 2 times daily      levothyroxine (SYNTHROID) 25 MCG tablet TAKE 1 TABLET BY MOUTH DAILY 30 tablet 5    levETIRAcetam (KEPPRA) 750 MG tablet TAKE 1 TABLET BY MOUTH TWICE A DAY 60 tablet 5    azithromycin (ZITHROMAX) 250 MG tablet TAKE 2 TABS TO START THEN 1 TAB DAILY DAYS 2 -  5 1 packet 0    lisinopril (PRINIVIL;ZESTRIL) 5 MG tablet TAKE 1 TABLET BY MOUTH DAILY 90 tablet 3    isosorbide mononitrate (IMDUR) 30 MG extended release tablet TAKE 1 TABLET BY MOUTH DAILY 90 tablet 3    ketorolac (ACULAR) 0.5 % ophthalmic solution PLACE 1 DROP FOUR TIMES A DAY IN BOTH EYES FOR 7 DAYS 1 Bottle 0    CPAP Machine MISC by Does not apply route      albuterol sulfate HFA (PROVENTIL HFA) 108 (90 Base) MCG/ACT inhaler Inhale 2 puffs into the lungs every 6 hours as needed for Wheezing 1 Inhaler 3    varenicline (CHANTIX STARTING MONTH PAK) 0.5 MG X 11 & 1 MG X 42 tablet Take by mouth.  1 each 0    magnesium (MAGNESIUM-OXIDE) 250 MG TABS tablet Take 250 mg by mouth daily      acetaminophen (TYLENOL) 500 MG tablet Take 500 mg by mouth every 6 hours as needed      loratadine (CLARITIN) 10 MG tablet Take 1 tablet by mouth daily 30 tablet 3     No current facility-administered medications for this visit.         Allergies   Allergen Reactions    Seasonal        Physical Exam:  Vitals:    12/10/20 1544 12/10/20 1551   BP: (!) 118/58 (!) 118/57   Pulse: 109    Temp: 97 °F (36.1 °C)    SpO2: 97%    Weight: 186 lb 3.2 oz (84.5 kg)    Height: 5' 10\" (1.778 m)         Wt Readings from Last 2 Encounters:   12/10/20 186 lb 3.2 oz (84.5 kg)   12/08/20 185 lb (83.9 kg)     General Appearance:  Alert, cooperative, no distress, appears stated age   Head:  Normocephalic, without obvious abnormality, atraumatic   Eyes:  PERRL, conjunctiva/corneas clear       Nose: Nares normal, no drainage or sinus tenderness   Throat: Lips, mucosa, and tongue normal   Neck: Supple, symmetrical, trachea midline, no adenopathy, thyroid: not enlarged, symmetric, no tenderness/mass/nodules, no carotid bruit or JVD       Lungs:   Clear to auscultation bilaterally, respirations unlabored   Heart:  Regular but lakesha, normal S1/S2, no M/R/G      Abdomen:   Soft, non-tender, bowel sounds active all four quadrants,  no masses, no organomegaly   Extremities: Extremities normal, atraumatic, no cyanosis or edema   Pulses: Carotid      L   2      R2  Radial       L    2     R2     Skin: Petechial rash on bilateral lower legs above the sock line up to the knee   Pysch: Normal mood and affect   Neurologic: Normal     Pulses: Carotid      L   2     R2  Radial       L   2     R2  Femoral    L   2     R2  Popliteal    L   2     R2  DP            L   1 +doppler      R0 +doppler  PT             L   1 +dopple     R1 +doppler(weak)           Lab Results   Component Value Date    TRIG 129 10/16/2020    HDL 38 10/16/2020    LDLCALC 56 10/16/2020    LABVLDL 26 10/16/2020     Lab Results   Component Value Date     12/03/2020    K 4.9 12/03/2020    K 4.6 10/30/2018    BUN 21 12/03/2020    CREATININE 1.3 12/03/2020    No components found for: HGBA1C    Lab Results   Component Value Date    TSH 1.73 10/16/2020     ECG 4/12/18: Sinus Bradycardia, QTc 433ms          ECG 6/20/19: Sinus bradycardia  ECG 12/12/19: Sinus rhythm   ECG 11/19/20: Atrial fibrillation   ECG 12/10/20: Sinus rhythm       Procedures:     C 11/13/2012 (Dr. Robert Blancas)   LV wall motion: Normal LV wall motion  LV gram - 60% EF   Normal EDP  LMCA distal 75% lesion with disruption and possible mobile plaque   Proximal 80% LAD densely calcified lesion   Circumflex: entire artery has luminal irregularities, non-dominate  RCA: Proximal widely patent in previously placed stent; entire vessel has luminal irregularities; Mid 30% tapering lesion with arteriomegaly. CABG 11/14/2012 (Dr. Ar Rodriguez)   1. Transesophageal echocardiogram.  2.  Endoscopic vein harvesting. 3.  Urgent coronary artery bypass graft x2 (left internal mammary artery to  left anterior descending, saphenous vein graft to obtuse marginal 1). DCCV 11/25/20  FINDINGS:  Successful synchronized cardioversion with a single 200-joule  biphasic cardioversion shock. The patient converted from atrial  fibrillation to a normal sinus rhythm. Imaging:     Echo 8/20/14  Normal left ventricular size and systolic function. Ejection fraction is visually estimated to be 50%. Normal right ventricular size and function. The left atrium is mildly dilated. There is mild tricuspid regurgitation with RVSP estimated at 32 mmHg. CT head wo contrast 10/10/2015  No acute intracranial abnormality. Carotid Duplex 10/15/2015  The right internal carotid artery appears to have a 1-15% diameter reducing   stenosis based on velocity criteria. The right vertebral artery demonstrates normal antegrade flow. The left internal carotid artery appears to have a 1-15% diameter reducing   stenosis based on velocity criteria. The left vertebral artery demonstrates normal antegrade flow. Echo 10/15/15  Left ventricle size is normal. Normal left ventricular wall thickness.   Ejection fraction is visually estimated to be 55-60 %. No regional wall motion abnormalities are noted. Normal diastolic function. The aortic valve is thickened/calcified with decreased leaflet mobility. The aortic valve area is calculated at 1.4 cm2 with a maximum pressure  gradient of 28 mmHg and a mean pressure gradient of 10 mmHg. This is c/w mild aortic stenosis. Mild aortic regurgitation is present. There is mild tricuspid regurgitation with RVSP estimated at 34 mmHg. The right atrium is mildly dilated. Echo 10/28/16  LVH with normal systolic function. EF  70%. The left atrium is normal in size. Trace to mild mitral regurgitation is present. Mild aortic regurgitation is present. Normal right ventricular size and function. Carotid duplex 7/12/19  The right internal carotid artery appears to have a <50% diameter reducing    stenosis based on velocity criteria.    The right vertebral artery demonstrates normal antegrade flow.    Previous left carotid endarterectomy.    The left internal carotid artery demonstrates no significant stenosis.    The left vertebral artery demonstrates normal antegrade flow. Assessment:  1. Chronic diastolic CHF, class 2  2. Atrial fibrillation, paroxysmal  3. PAD  4. CAD of native coronary arteries without angina-CABG 2012  5. Hyperlipidemia with goal LDL<70mg/dL  6. EDSON with CPAP therapy  7. Nicotine addiction   8. Melena    Plan:   I think that Mr. Gena Melchor  is entirely stable from a cardiovascular standpoint. I see no need to make any changes currently in his medical regimen or pursue further testing. He continues in a regular rhythm today by EKG and will remain on Xarelto 20 mg daily. He will stay on amiodarone 200mg once daily. He is not endorsing any symptoms representing angina and his blood pressure is well controlled. He gave a history of an episode of melena but his hemoglobin was 14.7g/dL on 12/3/20. I don't think I would pursue this further unless he has any recurrent episodes.      I will see him in the office for follow up in 6 months. This note was scribed in the presence of Benedetta Severs, MD by [employer], RN. Physician Attestation:  The scribes documentation has been prepared under my direction and personally reviewed by me in its entirety. I, Dr. Brian Sutton personally performed the services described in this documentation as scribed by my RN in my presence, and I confirm that the note above accurately reflects all work, treatment, procedures, and medical decision making performed by me.

## 2020-12-29 RX ORDER — TORSEMIDE 20 MG/1
TABLET ORAL
Qty: 60 TABLET | Refills: 11 | Status: SHIPPED | OUTPATIENT
Start: 2020-12-29 | End: 2021-12-20

## 2021-01-18 ENCOUNTER — HOSPITAL ENCOUNTER (OUTPATIENT)
Dept: GENERAL RADIOLOGY | Age: 81
Discharge: HOME | End: 2021-01-18
Payer: MEDICARE

## 2021-01-18 ENCOUNTER — HOSPITAL ENCOUNTER (OUTPATIENT)
Age: 81
Discharge: HOME | End: 2021-01-18
Payer: MEDICARE

## 2021-01-18 DIAGNOSIS — R06.09 DOE (DYSPNEA ON EXERTION): ICD-10-CM

## 2021-01-18 PROBLEM — R63.0 ANOREXIA: Status: ACTIVE | Noted: 2021-01-18

## 2021-01-18 PROCEDURE — 71046 X-RAY EXAM CHEST 2 VIEWS: CPT

## 2021-01-19 ENCOUNTER — OFFICE VISIT (OUTPATIENT)
Dept: PRIMARY CARE CLINIC | Age: 81
End: 2021-01-19
Payer: COMMERCIAL

## 2021-01-19 DIAGNOSIS — Z20.822 SUSPECTED COVID-19 VIRUS INFECTION: Primary | ICD-10-CM

## 2021-01-19 LAB — SARS-COV-2, NAA: NOT DETECTED

## 2021-01-19 PROCEDURE — G8417 CALC BMI ABV UP PARAM F/U: HCPCS | Performed by: NURSE PRACTITIONER

## 2021-01-19 PROCEDURE — 99211 OFF/OP EST MAY X REQ PHY/QHP: CPT | Performed by: NURSE PRACTITIONER

## 2021-01-19 PROCEDURE — G8428 CUR MEDS NOT DOCUMENT: HCPCS | Performed by: NURSE PRACTITIONER

## 2021-01-19 NOTE — PROGRESS NOTES
Lois Townsend received a viral test for COVID-19. They were educated on isolation and quarantine as appropriate. For any symptoms, they were directed to seek care from their PCP, given contact information to establish with a doctor, directed to an urgent care or the emergency room.

## 2021-01-29 PROBLEM — J18.9 PNEUMONIA OF LEFT LOWER LOBE DUE TO INFECTIOUS ORGANISM: Status: ACTIVE | Noted: 2021-01-29

## 2021-02-15 ENCOUNTER — HOSPITAL ENCOUNTER (OUTPATIENT)
Age: 81
Discharge: HOME | End: 2021-02-15
Payer: MEDICARE

## 2021-02-15 ENCOUNTER — HOSPITAL ENCOUNTER (OUTPATIENT)
Dept: GENERAL RADIOLOGY | Age: 81
Discharge: HOME | End: 2021-02-15
Payer: MEDICARE

## 2021-02-15 DIAGNOSIS — J18.9 PNEUMONIA OF LEFT LOWER LOBE DUE TO INFECTIOUS ORGANISM: ICD-10-CM

## 2021-02-15 PROCEDURE — 71046 X-RAY EXAM CHEST 2 VIEWS: CPT

## 2021-02-17 ENCOUNTER — HOSPITAL ENCOUNTER (OUTPATIENT)
Dept: CT IMAGING | Age: 81
Discharge: HOME | End: 2021-02-17
Payer: MEDICARE

## 2021-02-17 ENCOUNTER — TELEPHONE (OUTPATIENT)
Dept: PULMONOLOGY | Age: 81
End: 2021-02-17

## 2021-02-17 DIAGNOSIS — J18.9 PERSISTENT PNEUMONIA: ICD-10-CM

## 2021-02-17 PROCEDURE — 71250 CT THORAX DX C-: CPT

## 2021-02-17 NOTE — TELEPHONE ENCOUNTER
----- Message from Hal Plascencia MD sent at 2/17/2021 11:56 AM EST -----  I was asked by patient's primary physician to see him for bilateral pneumonia. Please give patient an appointment to see me tomorrow 2/18.

## 2021-02-18 ENCOUNTER — OFFICE VISIT (OUTPATIENT)
Dept: PULMONOLOGY | Age: 81
End: 2021-02-18
Payer: MEDICARE

## 2021-02-18 VITALS
DIASTOLIC BLOOD PRESSURE: 80 MMHG | WEIGHT: 198 LBS | BODY MASS INDEX: 28.35 KG/M2 | SYSTOLIC BLOOD PRESSURE: 126 MMHG | TEMPERATURE: 97.1 F | RESPIRATION RATE: 12 BRPM | HEIGHT: 70 IN | OXYGEN SATURATION: 97 % | HEART RATE: 98 BPM

## 2021-02-18 DIAGNOSIS — R06.02 SOB (SHORTNESS OF BREATH): ICD-10-CM

## 2021-02-18 DIAGNOSIS — J18.9 MULTIFOCAL PNEUMONIA: Primary | ICD-10-CM

## 2021-02-18 PROCEDURE — G8427 DOCREV CUR MEDS BY ELIG CLIN: HCPCS | Performed by: INTERNAL MEDICINE

## 2021-02-18 PROCEDURE — 4040F PNEUMOC VAC/ADMIN/RCVD: CPT | Performed by: INTERNAL MEDICINE

## 2021-02-18 PROCEDURE — 99204 OFFICE O/P NEW MOD 45 MIN: CPT | Performed by: INTERNAL MEDICINE

## 2021-02-18 PROCEDURE — 1036F TOBACCO NON-USER: CPT | Performed by: INTERNAL MEDICINE

## 2021-02-18 PROCEDURE — G8484 FLU IMMUNIZE NO ADMIN: HCPCS | Performed by: INTERNAL MEDICINE

## 2021-02-18 PROCEDURE — G8417 CALC BMI ABV UP PARAM F/U: HCPCS | Performed by: INTERNAL MEDICINE

## 2021-02-18 PROCEDURE — 1123F ACP DISCUSS/DSCN MKR DOCD: CPT | Performed by: INTERNAL MEDICINE

## 2021-02-18 RX ORDER — UMECLIDINIUM BROMIDE AND VILANTEROL TRIFENATATE 62.5; 25 UG/1; UG/1
1 POWDER RESPIRATORY (INHALATION) DAILY
Qty: 1 EACH | Refills: 0 | COMMUNITY
Start: 2021-02-18 | End: 2021-03-18

## 2021-02-18 NOTE — PROGRESS NOTES
Chief complaint  This is a 78y.o. year old male  who presents with a chief complaint of   Chief Complaint   Patient presents with    Shortness of Breath       HPI  39-year-old man presents for evaluation of bilateral pneumonia and shortness of breath. He states his illness began about 4 or 5 weeks ago. Since then he has noticed shortness of breath particularly with climbing the steps to his office. He has also had a cough productive of brown mucus as well as brown mucus when he blows his nose. He was treated with 10 days of Levaquin, but noticed no improvement in his symptoms. He is now taking a 10-day course of doxycycline. He feels that his mucus production is improving. He says sometimes he coughs when he eats or drinks, but denies overt aspiration. He denies fevers, recent travel or sick contacts.     Occupation: Business owner      Past Medical History:   Diagnosis Date    Acute diastolic congestive heart failure ([address] 10/28/2016    Anxiety     Atrial fibrillation ([address] 11/28/2014    s/p ablation 2015    CAD (coronary artery disease)     Cancer ([address]     colon    Carotid artery stenosis     Cataracts, bilateral     Cerebral artery occlusion with cerebral infarction ([address]     Diverticulitis of colon     GERD (gastroesophageal reflux disease)     Hyperlipidemia     Hypertension     Liver disease 1959    in the [employer] [place] had Hepatitis    Melanoma in situ of back ([address]     Melanoma in situ of upper arm ([address] 2005    left/ excision by Dr Kieran Love    Migraine with aura 3/21/2014    MRSA infection 12/13/2016    Osteoarthritis     Peptic ulcer disease     Seizures ([address]     Skin cancer (melanoma) ([address]     on back     Thyroid disease     Tobacco abuse 1/19/2017    Unspecified sleep apnea        Past Surgical History:   Procedure Laterality Date    CAROTID ENDARTERECTOMY  3-1999    COLON SURGERY  4/15/05    Right colectomy to removal of cecum and terminal ileum    COLONOSCOPY      CORONARY ANGIOPLASTY WITH STENT PLACEMENT      CORONARY ARTERY BYPASS GRAFT  11/14/12    x 2 (LIMA-LAD, SVG-OM1)    EYE SURGERY      FRACTURE SURGERY Left 1959    left arm    KNEE ARTHROSCOPY      LIPOMA RESECTION  2009    Removal lipoma left posterior neck by Dr Franck Wisdom  9/13/05    Excision left arm primary melamoma and sentinel lymph node bs of left axilla by Dr Jacky Hartman SKIN BIOPSY      TONSILLECTOMY AND ADENOIDECTOMY      TOTAL KNEE ARTHROPLASTY Left 07/01/2016    Left Total Knee Replacement    UPPER GASTROINTESTINAL ENDOSCOPY         Current Outpatient Medications   Medication Sig Dispense Refill    doxycycline hyclate (VIBRA-TABS) 100 MG tablet Take 1 tablet by mouth 2 times daily for 10 days 20 tablet 0    levETIRAcetam (KEPPRA) 750 MG tablet TAKE ONE (1) TABLET BY MOUTH TWICE A DAY  60 tablet 5    torsemide (DEMADEX) 20 MG tablet TAKE TWO (2) TABLETS BY MOUTH DAILY  60 tablet 11    mirtazapine (REMERON) 15 MG tablet Take 1 tablet by mouth nightly 30 tablet 5    pantoprazole (PROTONIX) 40 MG tablet Take 1 tablet by mouth 2 times daily 60 tablet 0    amiodarone (CORDARONE) 200 MG tablet Take 1 tablet by mouth daily TAKE ONE TABLET BY MOUTH EVERY DAY 60 tablet 5    XARELTO 20 MG TABS tablet TAKE ONE (1) TABLET BY MOUTH DAILY WITH SUPPER  90 tablet 2    rosuvastatin (CRESTOR) 40 MG tablet TAKE ONE (1) TABLET BY MOUTH DAILY  30 tablet 6    Ferrous Sulfate (IRON PO) Take by mouth 2 times daily      levothyroxine (SYNTHROID) 25 MCG tablet TAKE 1 TABLET BY MOUTH DAILY 30 tablet 5    lisinopril (PRINIVIL;ZESTRIL) 5 MG tablet TAKE 1 TABLET BY MOUTH DAILY 90 tablet 3    isosorbide mononitrate (IMDUR) 30 MG extended release tablet TAKE 1 TABLET BY MOUTH DAILY 90 tablet 3    ketorolac (ACULAR) 0.5 % ophthalmic solution PLACE 1 DROP FOUR TIMES A DAY IN BOTH EYES FOR 7 DAYS 1 Bottle 0    CPAP Machine MISC by Does not apply route      albuterol sulfate HFA (PROVENTIL HFA) 108 (90 Base) MCG/ACT inhaler Inhale 2 puffs into the lungs every 6 hours as needed for Wheezing 1 Inhaler 3    varenicline (CHANTIX STARTING MONTH ) 0.5 MG X 11 & 1 MG X 42 tablet Take by mouth. 1 each 0    magnesium (MAGNESIUM-OXIDE) 250 MG TABS tablet Take 250 mg by mouth daily      acetaminophen (TYLENOL) 500 MG tablet Take 500 mg by mouth every 6 hours as needed      loratadine (CLARITIN) 10 MG tablet Take 1 tablet by mouth daily 30 tablet 3     No current facility-administered medications for this visit. Allergies   Allergen Reactions    Seasonal        Family History   Problem Relation Age of Onset    Cancer Father     Lung Cancer Father     Cancer Sister     Cancer Brother     Lung Cancer Brother     Cancer Brother     Lung Cancer Brother     Cancer Brother        Social History     Socioeconomic History    Marital status:      Spouse name: Not on file    Number of children: 3    Years of education: Not on file    Highest education level: Not on file   Occupational History    Occupation: Self Employed   Social Needs    Financial resource strain: Not hard at all   [employer] insecurity     Worry: Never true     Inability: Never true    Transportation needs     Medical: No     Non-medical: No   Tobacco Use    Smoking status: Former Smoker     Packs/day: 0.50     Years: 8.00     Pack years: 4.00     Types: Cigarettes     Start date: 2009     Quit date: 2021     Years since quittin.1    Smokeless tobacco: Never Used   Substance and Sexual Activity    Alcohol use:  Yes     Alcohol/week: 3.0 standard drinks     Types: 3 Glasses of wine per week     Comment: 3 glasses per week    Drug use: No    Sexual activity: Yes     Partners: Female   Lifestyle    Physical activity     Days per week: Not on file     Minutes per session: Not on file    Stress: Not on file   Relationships    Social connections Talks on phone: Not on file     Gets together: Not on file     Attends Rastafarian service: Not on file     Active member of club or organization: Not on file     Attends meetings of clubs or organizations: Not on file     Relationship status: Not on file    Intimate partner violence     Fear of current or ex partner: Not on file     Emotionally abused: Not on file     Physically abused: Not on file     Forced sexual activity: Not on file   Other Topics Concern    Not on file   Social History Narrative    Not on file   Smoked 1 pack/day for 12 years (ages [age]). Admits that he still smokes occasionally.     Immunization History   Administered Date(s) Administered    Influenza 10/15/2012, 10/02/2013    Influenza Vaccine, unspecified formulation 10/21/2016    Influenza Virus Vaccine 10/12/2015    Influenza Whole 10/29/2010    Influenza, High Dose (Fluzone 65 yrs and older) 12/01/2017, 10/01/2018    Influenza, Quadv, adjuvanted, 65 yrs +, IM, PF (Fluad) 10/16/2020    Influenza, Triv, inactivated, subunit, adjuvanted, IM (Fluad 65 yrs and older) 11/06/2019    Pneumococcal Conjugate 13-valent ([id number]) 10/12/2015    Pneumococcal Polysaccharide ([id number]) 11/09/2012    Tdap (Boostrix, Adacel) 03/29/2016       ROS:  GENERAL:  No fevers, no chills  EYES: No eye pain, no discharge  EARS/NOSE/THROAT: No sore throat, no tinnitus, no bloody nose  CARDIOVASCULAR:  No chest pain, no palpitations  RESPIRATORY:  +exertional shortness of breath, no wheezing, + cough  GASTROINTESTINAL:  No nausea, no vomiting, no bleeding  GENITOURINARY:  No hematuria, no dysuria  MUSCULOSKELETAL:  No myalgia, no joint swelling  NEUROLOGICAL:  No numbness or tingling, no seizures  SKIN:  No new rashes, no sores  LYMPHATIC:  No swollen glands, no lymphedema    PHYSICAL EXAM:  Vitals:    02/18/21 1006   BP: 126/80   Pulse: 98   Resp: 12   Temp: 97.1 °F (36.2 °C)   SpO2: 97%   on RA    Gen: Well developed; well nourished  Eyes: No scleral icterus. No conjunctival injection. ENT:  Oropharynx clear. External appearance of ears and nose normal.  Neck: Trachea midline. No obvious mass. No visible thyroid enlargement    Respiratory: Crackles over left lower lateral lung zone, no accessory muscle use  Cardiovascular: Irregular, no appreciable murmurs. No edema. Gastrointestinal: Soft, non-tender. No hernia  Skin: Warm and dry. No rashes or ulcers on visible areas. Normal texture and turgor  Lymphatic: No cervical LAD. No supraclavicular LAD. Musculoskeletal: No cyanosis, clubbing or joint deformity. Psychiatric: Normal mood and affect; exhibits normal insight and judgement     Data reviewed:  Pulmonary Function Testing (11/1/16)  FVC 2.52 L at 61% predicted ---> 3.31L at 80% predicted  FEV1 1.74 L at 59% predicted ----> 2.42L at 82% predicted  FEV1/FVC ratio at 69% ---->73% predicted  TLC 5.8 L at 82% predicted  VC 3.1L at 71% predicted  ERV 0.34L at 24% predicted  RV/TLC at 47% predicted  DLCO 19.9 at 90% predicted  DLCO/VA 3.75L at 112 % predicted    Overall: When slow vital capacity is used an obstructive defect is present. The obstruction is mild. There is significant reversal following bronchodilator. Lung volumes are normal.  Diffusing capacity is normal    Images and reports of chest imaging were reviewed by me. My interpretation is:  CXR (2/5/21): Infiltrate in the bilateral lower lobes; flattening of the diaphragms (infiltrates have decreased compared to January 2021)  Chest CT (2/17/21): Calcified mediastinal and hilar nodes; trace left pleural effusion; centrilobular emphysema; scattered atelectasis and granulomas bilaterally; bilateral lower lobe infiltrate    ECHO (10/28/16)  Summary   LVH with normal systolic function. EF    70%. The left atrium is normal in size. Trace to mild mitral regurgitation is present. Mild aortic regurgitation is present. Normal right ventricular size and function.     Lab Results Component Value Date    WBC 5.4 01/18/2021    HGB 11.4 (L) 01/18/2021    HCT 34.8 (L) 01/18/2021    MCV 85.0 01/18/2021     01/18/2021       Lab Results   Component Value Date    .1 (H) 03/25/2013       Lab Results   Component Value Date    CREATININE 1.3 01/18/2021    BUN 11 01/18/2021     01/18/2021    K 5.0 01/18/2021     01/18/2021    CO2 28 01/18/2021         Assessment/Plan:78y.o. year old male presents with chief complaint of bilateral pneumonia and shortness of breath. Multifocal pneumonia- He has infiltrate in the bilateral lower lobes. His chest x-rays have improved between January and February. He is currently taking a 10-day course of doxycycline which I have asked him to complete. Will obtain sputum culture, CBC, procalcitonin, ESR and CRP. Will also obtain repeat chest x-ray in 4 weeks. Shortness of breath- Likely due to a combination of emphysema and pneumonia. I have given him a sample of Anoro to be used daily. We discussed that if this helps his breathing he should let me know so a prescription can be sent to his pharmacy. He is to return for follow-up in 4 weeks.       Xavier Figueroa MD  [facility] Pulmonology, Critical Care and Sleep

## 2021-03-04 ENCOUNTER — TELEPHONE (OUTPATIENT)
Dept: PULMONOLOGY | Age: 81
End: 2021-03-04

## 2021-03-04 NOTE — TELEPHONE ENCOUNTER
Patient had PNA. He is supposed to get the [employer] covid vaccine tomorrow am from [facility]. His wife, Michele Rowland, wants to make sure this is okay?  Please advise

## 2021-03-18 ENCOUNTER — OFFICE VISIT (OUTPATIENT)
Dept: PULMONOLOGY | Age: 81
End: 2021-03-18
Payer: COMMERCIAL

## 2021-03-18 ENCOUNTER — HOSPITAL ENCOUNTER (OUTPATIENT)
Dept: GENERAL RADIOLOGY | Age: 81
Discharge: HOME | End: 2021-03-18
Payer: MEDICARE

## 2021-03-18 ENCOUNTER — HOSPITAL ENCOUNTER (OUTPATIENT)
Age: 81
Discharge: HOME | End: 2021-03-18
Payer: MEDICARE

## 2021-03-18 VITALS
HEART RATE: 98 BPM | OXYGEN SATURATION: 96 % | HEIGHT: 70 IN | SYSTOLIC BLOOD PRESSURE: 116 MMHG | RESPIRATION RATE: 12 BRPM | BODY MASS INDEX: 28.43 KG/M2 | WEIGHT: 198.6 LBS | TEMPERATURE: 97.1 F | DIASTOLIC BLOOD PRESSURE: 64 MMHG

## 2021-03-18 DIAGNOSIS — J18.9 MULTIFOCAL PNEUMONIA: ICD-10-CM

## 2021-03-18 DIAGNOSIS — J18.9 MULTIFOCAL PNEUMONIA: Primary | ICD-10-CM

## 2021-03-18 DIAGNOSIS — J43.2 CENTRILOBULAR EMPHYSEMA (HCC): ICD-10-CM

## 2021-03-18 DIAGNOSIS — I48.0 PAROXYSMAL ATRIAL FIBRILLATION (HCC): ICD-10-CM

## 2021-03-18 LAB
BASOPHILS ABSOLUTE: 0 K/UL (ref 0–0.2)
BASOPHILS RELATIVE PERCENT: 0.7 %
C-REACTIVE PROTEIN: <3 MG/L (ref 0–5.1)
EOSINOPHILS ABSOLUTE: 0.1 K/UL (ref 0–0.6)
EOSINOPHILS RELATIVE PERCENT: 1.4 %
HCT VFR BLD CALC: 33.9 % (ref 40.5–52.5)
HEMOGLOBIN: 11.1 G/DL (ref 13.5–17.5)
LYMPHOCYTES ABSOLUTE: 1 K/UL (ref 1–5.1)
LYMPHOCYTES RELATIVE PERCENT: 25.9 %
MCH RBC QN AUTO: 29.9 PG (ref 26–34)
MCHC RBC AUTO-ENTMCNC: 32.7 G/DL (ref 31–36)
MCV RBC AUTO: 91.6 FL (ref 80–100)
MONOCYTES ABSOLUTE: 0.3 K/UL (ref 0–1.3)
MONOCYTES RELATIVE PERCENT: 9 %
NEUTROPHILS ABSOLUTE: 2.4 K/UL (ref 1.7–7.7)
NEUTROPHILS RELATIVE PERCENT: 63 %
PDW BLD-RTO: 17 % (ref 12.4–15.4)
PLATELET # BLD: 216 K/UL (ref 135–450)
PMV BLD AUTO: 8.3 FL (ref 5–10.5)
PROCALCITONIN: 0.04 NG/ML (ref 0–0.15)
RBC # BLD: 3.7 M/UL (ref 4.2–5.9)
SEDIMENTATION RATE, ERYTHROCYTE: 19 MM/HR (ref 0–20)
WBC # BLD: 3.8 K/UL (ref 4–11)

## 2021-03-18 PROCEDURE — 99213 OFFICE O/P EST LOW 20 MIN: CPT | Performed by: INTERNAL MEDICINE

## 2021-03-18 PROCEDURE — 3023F SPIROM DOC REV: CPT | Performed by: INTERNAL MEDICINE

## 2021-03-18 PROCEDURE — 71046 X-RAY EXAM CHEST 2 VIEWS: CPT

## 2021-03-18 PROCEDURE — 1123F ACP DISCUSS/DSCN MKR DOCD: CPT | Performed by: INTERNAL MEDICINE

## 2021-03-18 PROCEDURE — 4040F PNEUMOC VAC/ADMIN/RCVD: CPT | Performed by: INTERNAL MEDICINE

## 2021-03-18 PROCEDURE — G8926 SPIRO NO PERF OR DOC: HCPCS | Performed by: INTERNAL MEDICINE

## 2021-03-18 PROCEDURE — G8417 CALC BMI ABV UP PARAM F/U: HCPCS | Performed by: INTERNAL MEDICINE

## 2021-03-18 PROCEDURE — 1036F TOBACCO NON-USER: CPT | Performed by: INTERNAL MEDICINE

## 2021-03-18 PROCEDURE — G8484 FLU IMMUNIZE NO ADMIN: HCPCS | Performed by: INTERNAL MEDICINE

## 2021-03-18 PROCEDURE — G8427 DOCREV CUR MEDS BY ELIG CLIN: HCPCS | Performed by: INTERNAL MEDICINE

## 2021-03-18 RX ORDER — ALBUTEROL SULFATE 90 UG/1
2 AEROSOL, METERED RESPIRATORY (INHALATION) EVERY 4 HOURS PRN
Qty: 1 INHALER | Refills: 3 | Status: SHIPPED | OUTPATIENT
Start: 2021-03-18

## 2021-03-18 RX ORDER — UMECLIDINIUM BROMIDE AND VILANTEROL TRIFENATATE 62.5; 25 UG/1; UG/1
1 POWDER RESPIRATORY (INHALATION) DAILY
Qty: 1 EACH | Refills: 5 | Status: SHIPPED | OUTPATIENT
Start: 2021-03-18 | End: 2021-04-02 | Stop reason: CLARIF

## 2021-03-18 NOTE — PROGRESS NOTES
Chief complaint  This is a 78y.o. year old male  who presents with a chief complaint of   Chief Complaint   Patient presents with    Follow-up     multifocal pneumonia       HPI  75-year-old man with multifocal pneumonia and emphysema presents for follow up. His shortness of breath has improved. He is no longer short of breath climbing the steps to his office. He says he is no longer coughing up mucus. He has been using Anoro intermittently. He says that this helps his breathing.     Past Medical History:   Diagnosis Date    Acute diastolic congestive heart failure ([address] 10/28/2016    Anxiety     Atrial fibrillation ([address] 11/28/2014    s/p ablation 2015    CAD (coronary artery disease)     Cancer ([address]     colon    Carotid artery stenosis     Cataracts, bilateral     Cerebral artery occlusion with cerebral infarction ([address]     Diverticulitis of colon     GERD (gastroesophageal reflux disease)     Hyperlipidemia     Hypertension     Liver disease 1959    in the [employer] in [place] citizen [place] ([facility] had Hepatitis    Melanoma in situ of back ([address]     Melanoma in situ of upper arm ([address] 2005    left/ excision by Dr Jaqui Lynch    Migraine with aura 3/21/2014    MRSA infection 12/13/2016    Osteoarthritis     Peptic ulcer disease     Seizures ([address]     Skin cancer (melanoma) ([address]     on back     Thyroid disease     Tobacco abuse 1/19/2017    Unspecified sleep apnea        Past Surgical History:   Procedure Laterality Date    CAROTID ENDARTERECTOMY  3-1999    COLON SURGERY  4/15/05    Right colectomy to removal of cecum and terminal ileum    COLONOSCOPY      CORONARY ANGIOPLASTY WITH STENT PLACEMENT      CORONARY ARTERY BYPASS GRAFT  11/14/12    x 2 (LIMA-LAD, SVG-OM1)    EYE SURGERY      FRACTURE SURGERY Left 1959    left arm    KNEE ARTHROSCOPY      LIPOMA RESECTION  2009    Removal lipoma left posterior neck by Dr Jason Gao  9/13/05    Excision left arm primary melamoma and sentinel lymph node bs of left axilla by Dr Inder Ferro ARTHROPLASTY Left 07/01/2016    Left Total Knee Replacement    UPPER GASTROINTESTINAL ENDOSCOPY         Current Outpatient Medications   Medication Sig Dispense Refill    albuterol sulfate HFA (PROVENTIL HFA) 108 (90 Base) MCG/ACT inhaler Inhale 2 puffs into the lungs every 4 hours as needed for Wheezing or Shortness of Breath 1 Inhaler 3    umeclidinium-vilanterol (ANORO ELLIPTA) 62.5-25 MCG/INH AEPB inhaler Inhale 1 puff into the lungs daily 1 each 5    umeclidinium-vilanterol (ANORO ELLIPTA) 62.5-25 MCG/INH AEPB inhaler Inhale 1 puff into the lungs daily LOT: [id number]. 1 inhaler given 1 each 0    levETIRAcetam (KEPPRA) 750 MG tablet TAKE ONE (1) TABLET BY MOUTH TWICE A DAY  60 tablet 5    torsemide (DEMADEX) 20 MG tablet TAKE TWO (2) TABLETS BY MOUTH DAILY  60 tablet 11    mirtazapine (REMERON) 15 MG tablet Take 1 tablet by mouth nightly 30 tablet 5    pantoprazole (PROTONIX) 40 MG tablet Take 1 tablet by mouth 2 times daily 60 tablet 0    amiodarone (CORDARONE) 200 MG tablet Take 1 tablet by mouth daily TAKE ONE TABLET BY MOUTH EVERY DAY 60 tablet 5    XARELTO 20 MG TABS tablet TAKE ONE (1) TABLET BY MOUTH DAILY WITH SUPPER  90 tablet 2    rosuvastatin (CRESTOR) 40 MG tablet TAKE ONE (1) TABLET BY MOUTH DAILY  30 tablet 6    Ferrous Sulfate (IRON PO) Take by mouth 2 times daily      levothyroxine (SYNTHROID) 25 MCG tablet TAKE 1 TABLET BY MOUTH DAILY 30 tablet 5    lisinopril (PRINIVIL;ZESTRIL) 5 MG tablet TAKE 1 TABLET BY MOUTH DAILY 90 tablet 3    isosorbide mononitrate (IMDUR) 30 MG extended release tablet TAKE 1 TABLET BY MOUTH DAILY 90 tablet 3    ketorolac (ACULAR) 0.5 % ophthalmic solution PLACE 1 DROP FOUR TIMES A DAY IN BOTH EYES FOR 7 DAYS 1 Bottle 0    CPAP Machine MISC by Does not apply route      varen[employer] organizations: Not on file     Relationship status: Not on file    Intimate partner violence     Fear of current or ex partner: Not on file     Emotionally abused: Not on file     Physically abused: Not on file     Forced sexual activity: Not on file   Other Topics Concern    Not on file   Social History Narrative    Not on file   Smoked 1 pack/day for 12 years (ages [age]). Admits that he still smokes occasionally. Immunization History   Administered Date(s) Administered    Influenza 10/15/2012, 10/02/2013    Influenza Vaccine, unspecified formulation 10/21/2016    Influenza Virus Vaccine 10/12/2015    Influenza Whole 10/29/2010    Influenza, High Dose (Fluzone 65 yrs and older) 12/01/2017, 10/01/2018    Influenza, Quadv, adjuvanted, 65 yrs +, IM, PF (Fluad) 10/16/2020    Influenza, Triv, inactivated, subunit, adjuvanted, IM (Fluad 65 yrs and older) 11/06/2019    Pneumococcal Conjugate 13-valent ([id number]) 10/12/2015    Pneumococcal Polysaccharide ([id number]) 11/09/2012    Tdap (Boostrix, Adacel) 03/29/2016       ROS:  GENERAL:  No fevers, no chills  RESPIRATORY:  Decreased shortness of breath, no significant cough      PHYSICAL EXAM:  Vitals:    03/18/21 0824   BP: 116/64   Pulse: 98   Resp: 12   Temp: 97.1 °F (36.2 °C)   SpO2: 96%   on RA    Gen: Well developed; well nourished  Eyes: No scleral icterus. No conjunctival injection. ENT:  Oropharynx clear. External appearance of ears and nose normal.  Neck: Trachea midline. No obvious mass. No visible thyroid enlargement    Respiratory: Clear to auscultation bilaterally, no accessory muscle use  Cardiovascular: Irregular, no appreciable murmurs. No edema. Gastrointestinal: Soft, non-tender. No hernia  Skin: Warm and dry. No rashes or ulcers on visible areas. Normal texture and turgor  Lymphatic: No cervical LAD. No supraclavicular LAD. Musculoskeletal: No cyanosis, clubbing or joint deformity.     Psychiatric: Normal mood and affect; exhibits normal insight and judgement     Data reviewed:  Pulmonary Function Testing (11/1/16)  FVC 2.52 L at 61% predicted ---> 3.31L at 80% predicted  FEV1 1.74 L at 59% predicted ----> 2.42L at 82% predicted  FEV1/FVC ratio at 69% ---->73% predicted  TLC 5.8 L at 82% predicted  VC 3.1L at 71% predicted  ERV 0.34L at 24% predicted  RV/TLC at 47% predicted  DLCO 19.9 at 90% predicted  DLCO/VA 3.75L at 112 % predicted    Overall: When slow vital capacity is used an obstructive defect is present. The obstruction is mild. There is significant reversal following bronchodilator. Lung volumes are normal.  Diffusing capacity is normal    Images and reports of chest imaging were reviewed by me. My interpretation is:  CXR (2/5/21): Infiltrate in the bilateral lower lobes; flattening of the diaphragms (infiltrates have decreased compared to January 2021)  Chest CT (2/17/21): Calcified mediastinal and hilar nodes; trace left pleural effusion; centrilobular emphysema; scattered atelectasis and granulomas bilaterally; bilateral lower lobe infiltrate    ECHO (10/28/16)  Summary   LVH with normal systolic function. EF    70%. The left atrium is normal in size. Trace to mild mitral regurgitation is present. Mild aortic regurgitation is present. Normal right ventricular size and function. Lab Results   Component Value Date    WBC 5.4 01/18/2021    HGB 11.4 (L) 01/18/2021    HCT 34.8 (L) 01/18/2021    MCV 85.0 01/18/2021     01/18/2021       Lab Results   Component Value Date    .1 (H) 03/25/2013       Lab Results   Component Value Date    CREATININE 1.3 01/18/2021    BUN 11 01/18/2021     01/18/2021    K 5.0 01/18/2021     01/18/2021    CO2 28 01/18/2021         Assessment/Plan:78y.o. year old male presents for follow up. Multifocal pneumonia- He has bilateral lower lobe infiltrates which improved on chest x-rays between January and February.   I reminded him to obtain repeat chest x-ray

## 2021-03-20 LAB
CULTURE, RESPIRATORY: ABNORMAL
CULTURE, RESPIRATORY: ABNORMAL
GRAM STAIN RESULT: ABNORMAL
ORGANISM: ABNORMAL

## 2021-03-22 RX ORDER — CEFDINIR 300 MG/1
300 CAPSULE ORAL 2 TIMES DAILY
Qty: 10 CAPSULE | Refills: 0 | Status: SHIPPED | OUTPATIENT
Start: 2021-03-22 | End: 2021-03-27

## 2021-04-27 DIAGNOSIS — I10 ESSENTIAL HYPERTENSION: Chronic | ICD-10-CM

## 2021-04-27 RX ORDER — ISOSORBIDE MONONITRATE 30 MG/1
TABLET, EXTENDED RELEASE ORAL
Qty: 90 TABLET | Refills: 11 | Status: SHIPPED | OUTPATIENT
Start: 2021-04-27 | End: 2022-05-11 | Stop reason: ALTCHOICE

## 2021-04-28 RX ORDER — LISINOPRIL 5 MG/1
TABLET ORAL
Qty: 90 TABLET | Refills: 3 | Status: SHIPPED | OUTPATIENT
Start: 2021-04-28 | End: 2022-05-17

## 2021-05-10 ENCOUNTER — OFFICE VISIT (OUTPATIENT)
Dept: ENT CLINIC | Age: 81
End: 2021-05-10
Payer: COMMERCIAL

## 2021-05-10 VITALS
TEMPERATURE: 97.8 F | WEIGHT: 198 LBS | SYSTOLIC BLOOD PRESSURE: 101 MMHG | DIASTOLIC BLOOD PRESSURE: 62 MMHG | HEIGHT: 71 IN | BODY MASS INDEX: 27.72 KG/M2 | HEART RATE: 76 BPM

## 2021-05-10 DIAGNOSIS — H61.21 IMPACTED CERUMEN OF RIGHT EAR: Primary | ICD-10-CM

## 2021-05-10 PROCEDURE — 69210 REMOVE IMPACTED EAR WAX UNI: CPT | Performed by: OTOLARYNGOLOGY

## 2021-05-10 NOTE — PROGRESS NOTES
Is following up for cerumen impactions. He also has a fair amount of hearing loss. has hearing aids. I last saw him in September 2020. He was told by his audiologist he needed his ears clean. He is happy with hearing aids right now    Procedure  Bilateral ear exam with cerumen removal  Right ear is visualized binoculars scope. There was a denser and impaction lying against the tympanic membrane that I was able to remove with a García suction. Underlying tympanic membrane intact and aerated middle ear    On the left side there was minimal wax that I removed with a García suction. Underlying tympanic membrane intact and aerated middle ear    Plan  Had a right cerumen impaction removed today in clinic. I told him to call for follow-up appointment if he needs to be cleaned again.

## 2021-06-10 ENCOUNTER — OFFICE VISIT (OUTPATIENT)
Dept: PULMONOLOGY | Age: 81
End: 2021-06-10
Payer: MEDICARE

## 2021-06-10 VITALS
RESPIRATION RATE: 16 BRPM | DIASTOLIC BLOOD PRESSURE: 64 MMHG | BODY MASS INDEX: 27.2 KG/M2 | OXYGEN SATURATION: 97 % | HEART RATE: 82 BPM | HEIGHT: 70 IN | TEMPERATURE: 97.5 F | WEIGHT: 190 LBS | SYSTOLIC BLOOD PRESSURE: 106 MMHG

## 2021-06-10 DIAGNOSIS — J43.2 CENTRILOBULAR EMPHYSEMA (HCC): ICD-10-CM

## 2021-06-10 DIAGNOSIS — J18.9 MULTIFOCAL PNEUMONIA: Primary | ICD-10-CM

## 2021-06-10 PROCEDURE — 3023F SPIROM DOC REV: CPT | Performed by: INTERNAL MEDICINE

## 2021-06-10 PROCEDURE — 1123F ACP DISCUSS/DSCN MKR DOCD: CPT | Performed by: INTERNAL MEDICINE

## 2021-06-10 PROCEDURE — G8427 DOCREV CUR MEDS BY ELIG CLIN: HCPCS | Performed by: INTERNAL MEDICINE

## 2021-06-10 PROCEDURE — 1036F TOBACCO NON-USER: CPT | Performed by: INTERNAL MEDICINE

## 2021-06-10 PROCEDURE — 99213 OFFICE O/P EST LOW 20 MIN: CPT | Performed by: INTERNAL MEDICINE

## 2021-06-10 PROCEDURE — G8926 SPIRO NO PERF OR DOC: HCPCS | Performed by: INTERNAL MEDICINE

## 2021-06-10 PROCEDURE — G8417 CALC BMI ABV UP PARAM F/U: HCPCS | Performed by: INTERNAL MEDICINE

## 2021-06-10 PROCEDURE — 4040F PNEUMOC VAC/ADMIN/RCVD: CPT | Performed by: INTERNAL MEDICINE

## 2021-06-24 ENCOUNTER — OFFICE VISIT (OUTPATIENT)
Dept: CARDIOLOGY CLINIC | Age: 81
End: 2021-06-24
Payer: COMMERCIAL

## 2021-06-24 VITALS
HEART RATE: 70 BPM | OXYGEN SATURATION: 96 % | SYSTOLIC BLOOD PRESSURE: 118 MMHG | BODY MASS INDEX: 26.45 KG/M2 | DIASTOLIC BLOOD PRESSURE: 62 MMHG | WEIGHT: 184.8 LBS | HEIGHT: 70 IN

## 2021-06-24 DIAGNOSIS — I25.10 CORONARY ARTERY DISEASE INVOLVING NATIVE CORONARY ARTERY OF NATIVE HEART WITHOUT ANGINA PECTORIS: ICD-10-CM

## 2021-06-24 DIAGNOSIS — I50.32 CHRONIC DIASTOLIC CHF (CONGESTIVE HEART FAILURE), NYHA CLASS 2 (HCC): ICD-10-CM

## 2021-06-24 DIAGNOSIS — I73.9 PAD (PERIPHERAL ARTERY DISEASE) (HCC): ICD-10-CM

## 2021-06-24 DIAGNOSIS — I48.19 PERSISTENT ATRIAL FIBRILLATION (HCC): Primary | ICD-10-CM

## 2021-06-24 DIAGNOSIS — E78.5 HYPERLIPIDEMIA LDL GOAL <70: ICD-10-CM

## 2021-06-24 DIAGNOSIS — G47.33 OSA (OBSTRUCTIVE SLEEP APNEA): ICD-10-CM

## 2021-06-24 PROCEDURE — 1123F ACP DISCUSS/DSCN MKR DOCD: CPT | Performed by: INTERNAL MEDICINE

## 2021-06-24 PROCEDURE — 93000 ELECTROCARDIOGRAM COMPLETE: CPT | Performed by: INTERNAL MEDICINE

## 2021-06-24 PROCEDURE — 4040F PNEUMOC VAC/ADMIN/RCVD: CPT | Performed by: INTERNAL MEDICINE

## 2021-06-24 PROCEDURE — G8417 CALC BMI ABV UP PARAM F/U: HCPCS | Performed by: INTERNAL MEDICINE

## 2021-06-24 PROCEDURE — G8427 DOCREV CUR MEDS BY ELIG CLIN: HCPCS | Performed by: INTERNAL MEDICINE

## 2021-06-24 PROCEDURE — 1036F TOBACCO NON-USER: CPT | Performed by: INTERNAL MEDICINE

## 2021-06-24 PROCEDURE — 99214 OFFICE O/P EST MOD 30 MIN: CPT | Performed by: INTERNAL MEDICINE

## 2021-06-24 RX ORDER — EMPAGLIFLOZIN 25 MG/1
TABLET, FILM COATED ORAL
COMMUNITY
Start: 2021-06-23

## 2021-06-24 RX ORDER — ACARBOSE 100 MG/1
TABLET ORAL
COMMUNITY
Start: 2021-06-03

## 2021-06-24 NOTE — PROGRESS NOTES
[address] - Cardiology      CC: \"I feel pretty good\"     HPI:   Collette Rogue is a 78 y.o. patient with a past medical history significant for atrial fibrillation, seizure disorder and CAD with prior PCI and then CABG X 2 vessels in November of 2012. He has atrial fibrillation and underwent an ablation by my partner Dr. Tiara Felipe. Unfortunately he had a recurrence of this and was placed on amiodarone and cardioverted. He was readmited to [facility] on 10/28/16-11/1/16 with some volume overload and was in a-fib again. He was found in atrial fibrillation while in the office and underwent successful cardioversion on 11/25/20. He returns to the office today in follow-up. Today, he reports feeling \"pretty good\". He denies chest pain with rest or exertion. His breathing has been comfortable without shortness of breath. Patient denies exertional chest pain/pressure, dyspnea at rest, GARIBAY, PND, orthopnea, palpitations, lightheadedness, weight changes, changes in LE edema, and syncope. He reports medication compliance and is tolerating. He denies any abnormal bruising or bleeding. Review of Systems:  Constitutional: No fatigue, weakness, night sweats or fever. HEENT: No new vision difficulties or ringing in the ears. Respiratory: No new SOB, PND, orthopnea or cough. Cardiovascular: See HPI   GI: No n/v, diarrhea, constipation, abdominal pain or changes in bowel habits. No melena, no hematochezia  : No urinary frequency, urgency, incontinence, hematuria or dysuria. Skin: No cyanosis or skin lesions. Musculoskeletal: No new muscle or joint pain. Neurological: No syncope or TIA-like symptoms.   Psychiatric: No anxiety, insomnia or depression     Current Outpatient Medications   Medication Sig Dispense Refill    JARDIANCE 25 MG tablet       metFORMIN (GLUCOPHAGE) 500 MG tablet       acarbose (PRECOSE) 100 MG tablet TAKE 1 TABLET BY MOUTH TWICE DAILY AS NEEDED 30 MINUTES PRIOR TO CHEAT MEALS      predniSONE (DELTASONE) 10 MG tablet 2 po bid x 4 days then 1 po bid x 4 days then 1 po qam x 4 days 28 tablet 0    fluticasone (FLONASE) 50 MCG/ACT nasal spray 2 sprays by Each Nostril route 2 times daily 1 Bottle 0    lisinopril (PRINIVIL;ZESTRIL) 5 MG tablet TAKE ONE (1) TABLET BY MOUTH DAILY  90 tablet 3    isosorbide mononitrate (IMDUR) 30 MG extended release tablet TAKE ONE (1) TABLET BY MOUTH DAILY  90 tablet 11    mirtazapine (REMERON) 15 MG tablet Take 1 tablet by mouth nightly 30 tablet 5    levothyroxine (SYNTHROID) 25 MCG tablet TAKE ONE (1) TABLET BY MOUTH DAILY  30 tablet 5    albuterol sulfate HFA (PROVENTIL HFA) 108 (90 Base) MCG/ACT inhaler Inhale 2 puffs into the lungs every 4 hours as needed for Wheezing or Shortness of Breath 1 Inhaler 3    levETIRAcetam (KEPPRA) 750 MG tablet TAKE ONE (1) TABLET BY MOUTH TWICE A DAY  60 tablet 5    torsemide (DEMADEX) 20 MG tablet TAKE TWO (2) TABLETS BY MOUTH DAILY  60 tablet 11    pantoprazole (PROTONIX) 40 MG tablet Take 1 tablet by mouth 2 times daily 60 tablet 0    amiodarone (CORDARONE) 200 MG tablet Take 1 tablet by mouth daily TAKE ONE TABLET BY MOUTH EVERY DAY 60 tablet 5    XARELTO 20 MG TABS tablet TAKE ONE (1) TABLET BY MOUTH DAILY WITH SUPPER  90 tablet 2    rosuvastatin (CRESTOR) 40 MG tablet TAKE ONE (1) TABLET BY MOUTH DAILY  30 tablet 6    Ferrous Sulfate (IRON PO) Take by mouth 2 times daily      ketorolac (ACULAR) 0.5 % ophthalmic solution PLACE 1 DROP FOUR TIMES A DAY IN BOTH EYES FOR 7 DAYS 1 Bottle 0    CPAP Machine MISC by Does not apply route      magnesium (MAGNESIUM-OXIDE) 250 MG TABS tablet Take 250 mg by mouth daily      acetaminophen (TYLENOL) 500 MG tablet Take 500 mg by mouth every 6 hours as needed      loratadine (CLARITIN) 10 MG tablet Take 1 tablet by mouth daily 30 tablet 3     No current facility-administered medications for this visit.        Allergies   Allergen Reactions    Seasonal        Physical Exam:  Vitals:    06/24/21 1258   BP: 118/62   Pulse: 70   SpO2: 96%   Weight: 184 lb 12.8 oz (83.8 kg)   Height: 5' 10\" (1.778 m)        Wt Readings from Last 2 Encounters:   06/24/21 184 lb 12.8 oz (83.8 kg)   06/18/21 186 lb 6.4 oz (84.6 kg)     General Appearance:  Alert, cooperative, no distress, appears stated age   Head:  Normocephalic, without obvious abnormality, atraumatic   Eyes:  PERRL, conjunctiva/corneas clear       Nose: Nares normal, no drainage or sinus tenderness   Throat: Lips, mucosa, and tongue normal   Neck: Supple, symmetrical, trachea midline, no adenopathy, thyroid: not enlarged, symmetric, no tenderness/mass/nodules, no carotid bruit or JVD       Lungs:    Inspiratory wheezes on the right    Heart:  Regular but [place], normal S1/S2, no M/R/G      Abdomen:   Soft, non-tender, bowel sounds active all four quadrants,  no masses, no organomegaly   Extremities: Extremities normal, atraumatic, no cyanosis or edema   Pulses: Carotid      L   2      R2  Radial       L    2     R2     Skin: Petechial rash on bilateral lower legs above the sock line up to the knee   Pysch: Normal mood and affect   Neurologic: Normal     Pulses: Carotid      L   2     R2  Radial       L   2     R2  Femoral    L   2     R2  Popliteal    L   2     R2  DP            L   1 +doppler      R0 +doppler  PT             L   1 +dopple     R1 +doppler(weak)           Lab Results   Component Value Date    TRIG 129 10/16/2020    HDL 38 10/16/2020    LDLCALC 56 10/16/2020    LABVLDL 26 10/16/2020     Lab Results   Component Value Date     01/18/2021    K 5.0 01/18/2021    K 4.6 10/30/2018    BUN 11 01/18/2021    CREATININE 1.3 01/18/2021    No components found for: HGBA1C    Lab Results   Component Value Date    TSH 1.73 10/16/2020     ECG 4/12/18: Sinus Bradycardia, QTc 433ms          ECG 6/20/19: Sinus bradycardia  ECG 12/12/19: Sinus rhythm   ECG 11/19/20: Atrial fibrillation   ECG 12/10/20: Sinus with decreased leaflet mobility. The aortic valve area is calculated at 1.4 cm2 with a maximum pressure  gradient of 28 mmHg and a mean pressure gradient of 10 mmHg. This is c/w mild aortic stenosis. Mild aortic regurgitation is present. There is mild tricuspid regurgitation with RVSP estimated at 34 mmHg. The right atrium is mildly dilated. Echo 10/28/16  LVH with normal systolic function. EF  70%. The left atrium is normal in size. Trace to mild mitral regurgitation is present. Mild aortic regurgitation is present. Normal right ventricular size and function. Carotid duplex 7/12/19  The right internal carotid artery appears to have a <50% diameter reducing    stenosis based on velocity criteria.    The right vertebral artery demonstrates normal antegrade flow.    Previous left carotid endarterectomy.    The left internal carotid artery demonstrates no significant stenosis.    The left vertebral artery demonstrates normal antegrade flow. Assessment:  1. Chronic diastolic CHF, class 2  2. Atrial fibrillation, persistent  3. PAD  4. CAD of native coronary arteries without angina-CABG 2012  5. Hyperlipidemia with goal LDL<70 mg/dL  6. EDSON with CPAP therapy  7. Nicotine addiction       Plan:   He does present in atrial fibrillation by EKG today in the office and was also in atrial fibrillation in January when in office with Dr. Joyce Dhillon. I will have him discontinue amiodarone as he has not maintained a regular rhythm. He is not endorsing any symptoms representing angina and he is currently well compensated from a heart failure standpoint. His blood pressure is well controlled and his most recent lipid profile was favorable. I have personally reviewed all previous testing for this visit today including imaging, lab results and EKG as detailed above. I will see him in the office for follow up in 3 months.        This note was scribed in the presence of Zuleyma Maldonado MD by [employer], RN.  Physician Attestation:  The scribes documentation has been prepared under my direction and personally reviewed by me in its entirety. I, Dr. Mary Quezada personally performed the services described in this documentation as scribed by my RN in my presence, and I confirm that the note above accurately reflects all work, treatment, procedures, and medical decision making performed by me.

## 2021-07-13 RX ORDER — ROSUVASTATIN CALCIUM 40 MG/1
TABLET, COATED ORAL
Qty: 90 TABLET | Refills: 3 | Status: SHIPPED | OUTPATIENT
Start: 2021-07-13 | End: 2022-09-02

## 2021-09-30 ENCOUNTER — OFFICE VISIT (OUTPATIENT)
Dept: CARDIOLOGY CLINIC | Age: 81
End: 2021-09-30
Payer: MEDICARE

## 2021-09-30 VITALS
WEIGHT: 180 LBS | OXYGEN SATURATION: 98 % | DIASTOLIC BLOOD PRESSURE: 70 MMHG | HEART RATE: 103 BPM | HEIGHT: 70 IN | BODY MASS INDEX: 25.77 KG/M2 | SYSTOLIC BLOOD PRESSURE: 120 MMHG

## 2021-09-30 DIAGNOSIS — I73.9 PAD (PERIPHERAL ARTERY DISEASE) (HCC): ICD-10-CM

## 2021-09-30 DIAGNOSIS — I50.32 CHRONIC DIASTOLIC CHF (CONGESTIVE HEART FAILURE), NYHA CLASS 2 (HCC): Primary | ICD-10-CM

## 2021-09-30 DIAGNOSIS — Z99.89 OSA ON CPAP: ICD-10-CM

## 2021-09-30 DIAGNOSIS — E78.5 HYPERLIPIDEMIA LDL GOAL <70: ICD-10-CM

## 2021-09-30 DIAGNOSIS — I25.10 CORONARY ARTERY DISEASE INVOLVING NATIVE CORONARY ARTERY OF NATIVE HEART WITHOUT ANGINA PECTORIS: ICD-10-CM

## 2021-09-30 DIAGNOSIS — I48.19 PERSISTENT ATRIAL FIBRILLATION (HCC): ICD-10-CM

## 2021-09-30 DIAGNOSIS — G47.33 OSA ON CPAP: ICD-10-CM

## 2021-09-30 PROCEDURE — 99214 OFFICE O/P EST MOD 30 MIN: CPT | Performed by: INTERNAL MEDICINE

## 2021-09-30 PROCEDURE — G8417 CALC BMI ABV UP PARAM F/U: HCPCS | Performed by: INTERNAL MEDICINE

## 2021-09-30 PROCEDURE — G8427 DOCREV CUR MEDS BY ELIG CLIN: HCPCS | Performed by: INTERNAL MEDICINE

## 2021-09-30 PROCEDURE — 1036F TOBACCO NON-USER: CPT | Performed by: INTERNAL MEDICINE

## 2021-09-30 PROCEDURE — 4040F PNEUMOC VAC/ADMIN/RCVD: CPT | Performed by: INTERNAL MEDICINE

## 2021-09-30 PROCEDURE — 1123F ACP DISCUSS/DSCN MKR DOCD: CPT | Performed by: INTERNAL MEDICINE

## 2021-09-30 PROCEDURE — 93000 ELECTROCARDIOGRAM COMPLETE: CPT | Performed by: INTERNAL MEDICINE

## 2021-09-30 RX ORDER — SILDENAFIL 50 MG/1
TABLET, FILM COATED ORAL
COMMUNITY
Start: 2021-07-27

## 2021-09-30 RX ORDER — AMIODARONE HYDROCHLORIDE 200 MG/1
TABLET ORAL
COMMUNITY
Start: 2021-09-18 | End: 2021-09-30 | Stop reason: ALTCHOICE

## 2021-09-30 NOTE — PROGRESS NOTES
[address] - Cardiology      CC: \"I feel well\"     HPI:   Manuel Edwards is a 78 y.o. patient with a past medical history significant for atrial fibrillation, seizure disorder and CAD with prior PCI and then CABG X 2 vessels in November of 2012. He has atrial fibrillation and underwent an ablation by my partner Dr. Michelle Goss. Unfortunately he had a recurrence of this and was placed on amiodarone and cardioverted. He was readmited to [facility] on 10/28/16-11/1/16 with some volume overload and was in a-fib again. He was found in atrial fibrillation while in the office and underwent successful cardioversion on 11/25/20. He presents today for follow up. He did temporarily discontinue use of CPAP but has resumed this and feels well. He denies chest pain with rest or exertion. His breathing has been comfortable without shortness of breath. Patient denies exertional chest pain/pressure, dyspnea at rest, GARIBAY, PND, orthopnea, palpitations, lightheadedness, weight changes, changes in LE edema, and syncope. He reports medication compliance and is tolerating. Review of Systems:  Constitutional: No fatigue, weakness, night sweats or fever. HEENT: No new vision difficulties or ringing in the ears. Respiratory: No new SOB, PND, orthopnea or cough. Cardiovascular: See HPI   GI: No n/v, diarrhea, constipation, abdominal pain or changes in bowel habits. No melena, no hematochezia  : No urinary frequency, urgency, incontinence, hematuria or dysuria. Skin: No cyanosis or skin lesions. Musculoskeletal: No new muscle or joint pain. Neurological: No syncope or TIA-like symptoms.   Psychiatric: No anxiety, insomnia or depression     Current Outpatient Medications   Medication Sig Dispense Refill    sildenafil (VIAGRA) 50 MG tablet TAKE 1 TABLET BY MOUTH EVERY DAY AS NEEDED      amiodarone (CORDARONE) 200 MG tablet       levothyroxine (SYNTHROID) 25 MCG tablet TAKE ONE (1) TABLET BY MOUTH DAILY  30 tablet 5    rosuvastatin (CRESTOR) 40 MG tablet TAKE ONE (1) TABLET BY MOUTH DAILY  90 tablet 3    levETIRAcetam (KEPPRA) 750 MG tablet TAKE 1 TABLET BY MOUTH TWICE A DAY  60 tablet 11    JARDIANCE 25 MG tablet       metFORMIN (GLUCOPHAGE) 500 MG tablet       acarbose (PRECOSE) 100 MG tablet TAKE 1 TABLET BY MOUTH TWICE DAILY AS NEEDED 30 MINUTES PRIOR TO CHEAT MEALS      predniSONE (DELTASONE) 10 MG tablet 2 po bid x 4 days then 1 po bid x 4 days then 1 po qam x 4 days 28 tablet 0    fluticasone (FLONASE) 50 MCG/ACT nasal spray 2 sprays by Each Nostril route 2 times daily 1 Bottle 0    lisinopril (PRINIVIL;ZESTRIL) 5 MG tablet TAKE ONE (1) TABLET BY MOUTH DAILY  90 tablet 3    isosorbide mononitrate (IMDUR) 30 MG extended release tablet TAKE ONE (1) TABLET BY MOUTH DAILY  90 tablet 11    mirtazapine (REMERON) 15 MG tablet Take 1 tablet by mouth nightly 30 tablet 5    albuterol sulfate HFA (PROVENTIL HFA) 108 (90 Base) MCG/ACT inhaler Inhale 2 puffs into the lungs every 4 hours as needed for Wheezing or Shortness of Breath 1 Inhaler 3    torsemide (DEMADEX) 20 MG tablet TAKE TWO (2) TABLETS BY MOUTH DAILY  60 tablet 11    pantoprazole (PROTONIX) 40 MG tablet Take 1 tablet by mouth 2 times daily 60 tablet 0    XARELTO 20 MG TABS tablet TAKE ONE (1) TABLET BY MOUTH DAILY WITH SUPPER  90 tablet 2    Ferrous Sulfate (IRON PO) Take by mouth 2 times daily      CPAP Machine MISC by Does not apply route      magnesium (MAGNESIUM-OXIDE) 250 MG TABS tablet Take 250 mg by mouth daily      acetaminophen (TYLENOL) 500 MG tablet Take 500 mg by mouth every 6 hours as needed      loratadine (CLARITIN) 10 MG tablet Take 1 tablet by mouth daily 30 tablet 3     No current facility-administered medications for this visit.        Allergies   Allergen Reactions    Seasonal        Physical Exam:  Vitals:    09/30/21 1622   BP: 120/70   Pulse: 103   SpO2: 98%   Weight: 180 lb (81.6 kg)   Height: 5' 10\" 11/13/2012 (Dr. Heidi Angelo)   LV wall motion: Normal LV wall motion  LV gram - 60% EF   Normal EDP  LMCA distal 75% lesion with disruption and possible mobile plaque   Proximal 80% LAD densely calcified lesion   Circumflex: entire artery has luminal irregularities, non-dominate  RCA: Proximal widely patent in previously placed stent; entire vessel has luminal irregularities; Mid 30% tapering lesion with arteriomegaly. CABG 11/14/2012 (Dr. Snony Lake)   1. Transesophageal echocardiogram.  2.  Endoscopic vein harvesting. 3.  Urgent coronary artery bypass graft x2 (left internal mammary artery to  left anterior descending, saphenous vein graft to obtuse marginal 1). DCCV 11/25/20  FINDINGS:  Successful synchronized cardioversion with a single 200-joule  biphasic cardioversion shock. The patient converted from atrial  fibrillation to a normal sinus rhythm. Imaging:     Echo 8/20/14  Normal left ventricular size and systolic function. Ejection fraction is visually estimated to be 50%. Normal right ventricular size and function. The left atrium is mildly dilated. There is mild tricuspid regurgitation with RVSP estimated at 32 mmHg. CT head wo contrast 10/10/2015  No acute intracranial abnormality. Carotid Duplex 10/15/2015  The right internal carotid artery appears to have a 1-15% diameter reducing   stenosis based on velocity criteria. The right vertebral artery demonstrates normal antegrade flow. The left internal carotid artery appears to have a 1-15% diameter reducing   stenosis based on velocity criteria. The left vertebral artery demonstrates normal antegrade flow. Echo 10/15/15  Left ventricle size is normal. Normal left ventricular wall thickness. Ejection fraction is visually estimated to be 55-60 %. No regional wall motion abnormalities are noted. Normal diastolic function. The aortic valve is thickened/calcified with decreased leaflet mobility.   The aortic valve area is calculated been prepared under my direction and personally reviewed by me in its entirety. I, Dr. Jovani Gamboa personally performed the services described in this documentation as scribed by my RN in my presence, and I confirm that the note above accurately reflects all work, treatment, procedures, and medical decision making performed by me.

## 2021-10-18 RX ORDER — RIVAROXABAN 20 MG/1
TABLET, FILM COATED ORAL
Qty: 90 TABLET | Refills: 2 | Status: SHIPPED | OUTPATIENT
Start: 2021-10-18

## 2021-12-14 ENCOUNTER — OFFICE VISIT (OUTPATIENT)
Dept: PULMONOLOGY | Age: 81
End: 2021-12-14
Payer: MEDICARE

## 2021-12-14 VITALS
OXYGEN SATURATION: 98 % | HEART RATE: 74 BPM | DIASTOLIC BLOOD PRESSURE: 70 MMHG | WEIGHT: 177.6 LBS | RESPIRATION RATE: 16 BRPM | BODY MASS INDEX: 25.43 KG/M2 | HEIGHT: 70 IN | SYSTOLIC BLOOD PRESSURE: 140 MMHG | TEMPERATURE: 96.4 F

## 2021-12-14 DIAGNOSIS — J44.9 COPD WITH ASTHMA (HCC): Primary | ICD-10-CM

## 2021-12-14 PROCEDURE — 4040F PNEUMOC VAC/ADMIN/RCVD: CPT | Performed by: INTERNAL MEDICINE

## 2021-12-14 PROCEDURE — 1036F TOBACCO NON-USER: CPT | Performed by: INTERNAL MEDICINE

## 2021-12-14 PROCEDURE — 99213 OFFICE O/P EST LOW 20 MIN: CPT | Performed by: INTERNAL MEDICINE

## 2021-12-14 PROCEDURE — G8427 DOCREV CUR MEDS BY ELIG CLIN: HCPCS | Performed by: INTERNAL MEDICINE

## 2021-12-14 PROCEDURE — 3023F SPIROM DOC REV: CPT | Performed by: INTERNAL MEDICINE

## 2021-12-14 PROCEDURE — 1123F ACP DISCUSS/DSCN MKR DOCD: CPT | Performed by: INTERNAL MEDICINE

## 2021-12-14 PROCEDURE — G8926 SPIRO NO PERF OR DOC: HCPCS | Performed by: INTERNAL MEDICINE

## 2021-12-14 PROCEDURE — G8484 FLU IMMUNIZE NO ADMIN: HCPCS | Performed by: INTERNAL MEDICINE

## 2021-12-14 PROCEDURE — G8417 CALC BMI ABV UP PARAM F/U: HCPCS | Performed by: INTERNAL MEDICINE

## 2021-12-14 NOTE — PROGRESS NOTES
Chief complaint  This is a 78y.o. year old male  who comes to see me with a chief complaint of   Chief Complaint   Patient presents with    Pneumonia    Follow-up       HPI  Here with a cc of pneumonia, COPD    Previous Dr. Mccray [facility] patient     He is doing quite well. He seldomly uses albuterol and seldomly gets SOB. Will get SOB when ascending stairs but that has been chronic for some time. He does not feel limited with his breathing at all.   Feels good         Current Outpatient Medications:     XARELTO 20 MG TABS tablet, TAKE ONE (1) TABLET BY MOUTH DAILY WITH SUPPER , Disp: 90 tablet, Rfl: 2    sildenafil (VIAGRA) 50 MG tablet, TAKE 1 TABLET BY MOUTH EVERY DAY AS NEEDED, Disp: , Rfl:     levothyroxine (SYNTHROID) 25 MCG tablet, TAKE ONE (1) TABLET BY MOUTH DAILY , Disp: 30 tablet, Rfl: 5    rosuvastatin (CRESTOR) 40 MG tablet, TAKE ONE (1) TABLET BY MOUTH DAILY , Disp: 90 tablet, Rfl: 3    levETIRAcetam (KEPPRA) 750 MG tablet, TAKE 1 TABLET BY MOUTH TWICE A DAY , Disp: 60 tablet, Rfl: 11    JARDIANCE 25 MG tablet, , Disp: , Rfl:     metFORMIN (GLUCOPHAGE) 500 MG tablet, , Disp: , Rfl:     acarbose (PRECOSE) 100 MG tablet, TAKE 1 TABLET BY MOUTH TWICE DAILY AS NEEDED 30 MINUTES PRIOR TO CHEAT MEALS, Disp: , Rfl:     fluticasone (FLONASE) 50 MCG/ACT nasal spray, 2 sprays by Each Nostril route 2 times daily, Disp: 1 Bottle, Rfl: 0    lisinopril (PRINIVIL;ZESTRIL) 5 MG tablet, TAKE ONE (1) TABLET BY MOUTH DAILY , Disp: 90 tablet, Rfl: 3    isosorbide mononitrate (IMDUR) 30 MG extended release tablet, TAKE ONE (1) TABLET BY MOUTH DAILY , Disp: 90 tablet, Rfl: 11    mirtazapine (REMERON) 15 MG tablet, Take 1 tablet by mouth nightly, Disp: 30 tablet, Rfl: 5    albuterol sulfate HFA (PROVENTIL HFA) 108 (90 Base) MCG/ACT inhaler, Inhale 2 puffs into the lungs every 4 hours as needed for Wheezing or Shortness of Breath, Disp: 1 Inhaler, Rfl: 3    torsemide (DEMADEX) 20 MG tablet, TAKE TWO (2) TABLETS BY MOUTH DAILY , Disp: 60 tablet, Rfl: 11    pantoprazole (PROTONIX) 40 MG tablet, Take 1 tablet by mouth 2 times daily, Disp: 60 tablet, Rfl: 0    Ferrous Sulfate (IRON PO), Take by mouth 2 times daily, Disp: , Rfl:     CPAP Machine MISC, by Does not apply route, Disp: , Rfl:     magnesium (MAGNESIUM-OXIDE) 250 MG TABS tablet, Take 250 mg by mouth daily, Disp: , Rfl:     acetaminophen (TYLENOL) 500 MG tablet, Take 500 mg by mouth every 6 hours as needed, Disp: , Rfl:     loratadine (CLARITIN) 10 MG tablet, Take 1 tablet by mouth daily, Disp: 30 tablet, Rfl: 3      PHYSICAL EXAM:  Vitals:    12/14/21 0944   BP: (!) 140/70   Pulse: 74   Resp: 16   Temp: 96.4 °F (35.8 °C)   SpO2: 98%       GENERAL:  Well nourished, alert, appears stated age, no distress  HEENT:  No scleral icterus, no conjunctival irritation  NECK:  No thyromegaly, no bruits  LYMPH:  No cervical or supraclavicular adenopathy  HEART:  Irregular, no murmurs  LUNGS:  Clear bilaterally   ABDOMEN:  No distention, no organomegaly  EXTREMITIES:  No edema, no digital clubbing  NEURO:  No localizing deficits, CN II-XII intact    Pulmonary Function Testing  Overall My impression is moderate obstruction with significant bronchodilator response    Chest imaging from 3/21 is reviewed. My interpretation is improving airspace disease      I reviewed above labs and studies pertinent to this visit and date    Assessment/Plan:  1. COPD with asthma ([facility] [address]  Has significant bronchodilator response on previous PFTS with mild emphysema on imaging. Has minimal symptoms.   Continue with albuterol prn    Follow up in 6 months

## 2021-12-20 RX ORDER — TORSEMIDE 20 MG/1
TABLET ORAL
Qty: 180 TABLET | Refills: 1 | Status: SHIPPED | OUTPATIENT
Start: 2021-12-20 | End: 2022-08-09

## 2022-03-19 PROBLEM — J44.9 COPD WITH ASTHMA (HCC): Status: ACTIVE | Noted: 2022-03-19

## 2022-03-19 PROBLEM — J44.89 COPD WITH ASTHMA: Status: ACTIVE | Noted: 2022-03-19

## 2022-05-10 NOTE — PROGRESS NOTES
[address] - Cardiology      CC: \"I feel fine\"     HPI: Tessie Elmore is a 78 y.o. patient with a past medical history significant for atrial fibrillation, seizure disorder and CAD with prior PCI and then CABG X 2 vessels in November of 2012. He has atrial fibrillation and underwent an ablation by my partner Dr. Brooks Campbell. Unfortunately he had a recurrence of this and was placed on amiodarone and cardioverted. He was readmited to [facility] on 10/28/16-11/1/16 with some volume overload and was in a-fib again. He was found in atrial fibrillation while in the office and underwent successful cardioversion on 11/25/20. He was back in atrial fibrillation in office and it was decided to practice rate control. He presents today for follow up. He reports feeling well overall. His breathing has been comfortable without shortness of breath. He denies chest pain with rest or exertion. Patient denies exertional chest pain/pressure, dyspnea at rest, GARIBAY, PND, orthopnea, palpitations, lightheadedness, weight changes, changes in LE edema, and syncope. He reports medication compliance and is tolerating. He denies abnormal bruising or bleeding. He will be moving to [place] but will return to the area during the year. Review of Systems:  Constitutional: No fatigue, weakness, night sweats or fever. HEENT: No new vision difficulties or ringing in the ears. Respiratory: No new SOB, PND, orthopnea or cough. Cardiovascular: See HPI   GI: No n/v, diarrhea, constipation, abdominal pain or changes in bowel habits. No melena, no hematochezia  : No urinary frequency, urgency, incontinence, hematuria or dysuria. Skin: No cyanosis or skin lesions. Musculoskeletal: No new muscle or joint pain. Neurological: No syncope or TIA-like symptoms.   Psychiatric: No anxiety, insomnia or depression     Current Outpatient Medications   Medication Sig Dispense Refill    torsemide (DEMADEX) 20 MG tablet TAKE 2 TABLETS BY MOUTH DAILY 180 tablet 1    mirtazapine (REMERON) 15 MG tablet TAKE ONE (1) TABLET BY MOUTH NIGHTLY 30 tablet 11    XARELTO 20 MG TABS tablet TAKE ONE (1) TABLET BY MOUTH DAILY WITH SUPPER  90 tablet 2    sildenafil (VIAGRA) 50 MG tablet TAKE 1 TABLET BY MOUTH EVERY DAY AS NEEDED      levothyroxine (SYNTHROID) 25 MCG tablet TAKE ONE (1) TABLET BY MOUTH DAILY  30 tablet 5    rosuvastatin (CRESTOR) 40 MG tablet TAKE ONE (1) TABLET BY MOUTH DAILY  90 tablet 3    levETIRAcetam (KEPPRA) 750 MG tablet TAKE 1 TABLET BY MOUTH TWICE A DAY  60 tablet 11    JARDIANCE 25 MG tablet       metFORMIN (GLUCOPHAGE) 500 MG tablet       acarbose (PRECOSE) 100 MG tablet TAKE 1 TABLET BY MOUTH TWICE DAILY AS NEEDED 30 MINUTES PRIOR TO CHEAT MEALS      fluticasone (FLONASE) 50 MCG/ACT nasal spray 2 sprays by Each Nostril route 2 times daily 1 Bottle 0    lisinopril (PRINIVIL;ZESTRIL) 5 MG tablet TAKE ONE (1) TABLET BY MOUTH DAILY  90 tablet 3    isosorbide mononitrate (IMDUR) 30 MG extended release tablet TAKE ONE (1) TABLET BY MOUTH DAILY  90 tablet 11    albuterol sulfate HFA (PROVENTIL HFA) 108 (90 Base) MCG/ACT inhaler Inhale 2 puffs into the lungs every 4 hours as needed for Wheezing or Shortness of Breath 1 Inhaler 3    pantoprazole (PROTONIX) 40 MG tablet Take 1 tablet by mouth 2 times daily 60 tablet 0    Ferrous Sulfate (IRON PO) Take by mouth 2 times daily      CPAP Machine MISC by Does not apply route      magnesium (MAGNESIUM-OXIDE) 250 MG TABS tablet Take 250 mg by mouth daily      acetaminophen (TYLENOL) 500 MG tablet Take 500 mg by mouth every 6 hours as needed      loratadine (CLARITIN) 10 MG tablet Take 1 tablet by mouth daily 30 tablet 3     No current facility-administered medications for this visit.        Allergies   Allergen Reactions    Seasonal        Physical Exam:  Vitals:    05/11/22 1249   BP: (!) 140/80   Pulse: 120   SpO2: 98%   Weight: 191 lb (86.6 kg)   Height: 5' 10\" (1.778 m)        Wt Readings from Last 2 Encounters:   05/11/22 191 lb (86.6 kg)   03/28/22 192 lb (87.1 kg)     General Appearance:  Alert, cooperative, no distress, appears stated age   Head:  Normocephalic, without obvious abnormality, atraumatic   Eyes:  PERRL, conjunctiva/corneas clear       Nose: Nares normal, no drainage or sinus tenderness   Throat: Lips, mucosa, and tongue normal   Neck: Supple, symmetrical, trachea midline, no adenopathy, thyroid: not enlarged, symmetric, no tenderness/mass/nodules, no carotid bruit or JVD       Lungs:    Inspiratory wheezes on the right    Heart:  Irreg irreg and tachy, normal S1/S2, no M/R/G      Abdomen:   Soft, non-tender, bowel sounds active all four quadrants,  no masses, no organomegaly   Extremities: Extremities normal, atraumatic, no cyanosis or edema   Pulses: Carotid      L   2      R2  Radial       L    2     R2     Skin: Petechial rash on bilateral lower legs above the sock line up to the knee   Pysch: Normal mood and affect   Neurologic: Normal     Pulses: Carotid      L   2     R2  Radial       L   2     R2  Femoral    L   2     R2  Popliteal    L   2     R2  DP            L   1 +doppler      R0 +doppler  PT             L   1 +dopple     R1 +doppler(weak)           Lab Results   Component Value Date    TRIG 195 03/28/2022    HDL 53 03/28/2022    LDLCALC 100 03/28/2022    LABVLDL 39 03/28/2022     Lab Results   Component Value Date     03/28/2022    K 5.0 03/28/2022    K 4.6 10/30/2018    BUN 17 03/28/2022    CREATININE 1.3 03/28/2022    No components found for: HGBA1C    Lab Results   Component Value Date    TSH 2.48 03/28/2022     Personally reviewed and interpreted   ECG 4/12/18: Sinus Bradycardia, QTc 433ms          ECG 6/20/19: Sinus bradycardia  ECG 12/12/19: Sinus rhythm   ECG 11/19/20: Atrial fibrillation   ECG 12/10/20: Sinus rhythm   ECG 6/24/21: Atrial fibrillation   ECG 9/30/21: Atrial fibrillation 84 bpm   ECG 5/11/22: Atrial fibrillation with RVR at 123 bpm       Procedures:     [facility] 11/13/2012 (Dr. Vira George)   LV wall motion: Normal LV wall motion  LV gram - 60% EF   Normal EDP  LMCA distal 75% lesion with disruption and possible mobile plaque   Proximal 80% LAD densely calcified lesion   Circumflex: entire artery has luminal irregularities, non-dominate  RCA: Proximal widely patent in previously placed stent; entire vessel has luminal irregularities; Mid 30% tapering lesion with arteriomegaly. CABG 11/14/2012 (Dr. Mariana Ruiz)   1. Transesophageal echocardiogram.  2.  Endoscopic vein harvesting. 3.  Urgent coronary artery bypass graft x2 (left internal mammary artery to  left anterior descending, saphenous vein graft to obtuse marginal 1). DCCV 11/25/20  FINDINGS:  Successful synchronized cardioversion with a single 200-joule  biphasic cardioversion shock. The patient converted from atrial  fibrillation to a normal sinus rhythm. Imaging:     Echo 8/20/14  Normal left ventricular size and systolic function. Ejection fraction is visually estimated to be 50%. Normal right ventricular size and function. The left atrium is mildly dilated. There is mild tricuspid regurgitation with RVSP estimated at 32 mmHg. CT head wo contrast 10/10/2015  No acute intracranial abnormality. Carotid Duplex 10/15/2015  The right internal carotid artery appears to have a 1-15% diameter reducing   stenosis based on velocity criteria. The right vertebral artery demonstrates normal antegrade flow. The left internal carotid artery appears to have a 1-15% diameter reducing   stenosis based on velocity criteria. The left vertebral artery demonstrates normal antegrade flow. Echo 10/15/15  Left ventricle size is normal. Normal left ventricular wall thickness. Ejection fraction is visually estimated to be 55-60 %. No regional wall motion abnormalities are noted. Normal diastolic function.   The aortic valve is thickened/calcified with decreased leaflet mobility. The aortic valve area is calculated at 1.4 cm2 with a maximum pressure  gradient of 28 mmHg and a mean pressure gradient of 10 mmHg. This is c/w mild aortic stenosis. Mild aortic regurgitation is present. There is mild tricuspid regurgitation with RVSP estimated at 34 mmHg. The right atrium is mildly dilated. Echo 10/28/16  LVH with normal systolic function. EF  70%. The left atrium is normal in size. Trace to mild mitral regurgitation is present. Mild aortic regurgitation is present. Normal right ventricular size and function. Carotid duplex 7/12/19  The right internal carotid artery appears to have a <50% diameter reducing    stenosis based on velocity criteria.    The right vertebral artery demonstrates normal antegrade flow.    Previous left carotid endarterectomy.    The left internal carotid artery demonstrates no significant stenosis.    The left vertebral artery demonstrates normal antegrade flow. Assessment:  1. Chronic diastolic CHF, class 2  2. Atrial fibrillation, chronic   3. PAD  4. CAD of native coronary arteries without angina-CABG 2012  5. Hyperlipidemia with goal LDL<70 mg/dL  6. EDSON with CPAP therapy    Plan:   His heart rate is elevated today in the office with chronic atrial fibrillation today by EKG. I will have him begin cardizem 180 mg daily for improved rate control. This can also improve his blood pressure that is slightly elevated as well. He can discontinue Imdur with his lack of anginal symptoms. He will remain on Xarelto 20 mg daily for stroke risk reduction. I have personally reviewed all previous testing for this visit today including imaging, lab results and EKG as detailed above. I will see him in the office for follow up in 3 months. This note was scribed in the presence of Char Sunshine MD by [employer], RN.   Physician Attestation:  The scribes documentation has been prepared under my direction and personally reviewed by me in its entirety. I, Dr. Brian Sutton personally performed the services described in this documentation as scribed by my RN in my presence, and I confirm that the note above accurately reflects all work, treatment, procedures, and medical decision making performed by me.

## 2022-05-11 ENCOUNTER — OFFICE VISIT (OUTPATIENT)
Dept: CARDIOLOGY CLINIC | Age: 82
End: 2022-05-11
Payer: MEDICARE

## 2022-05-11 VITALS
SYSTOLIC BLOOD PRESSURE: 130 MMHG | OXYGEN SATURATION: 98 % | HEART RATE: 120 BPM | WEIGHT: 191 LBS | DIASTOLIC BLOOD PRESSURE: 80 MMHG | BODY MASS INDEX: 27.35 KG/M2 | HEIGHT: 70 IN

## 2022-05-11 DIAGNOSIS — I48.20 CHRONIC ATRIAL FIBRILLATION (HCC): ICD-10-CM

## 2022-05-11 DIAGNOSIS — E78.5 HYPERLIPIDEMIA LDL GOAL <70: ICD-10-CM

## 2022-05-11 DIAGNOSIS — I50.32 CHRONIC DIASTOLIC CHF (CONGESTIVE HEART FAILURE), NYHA CLASS 2 (HCC): Primary | ICD-10-CM

## 2022-05-11 DIAGNOSIS — I25.10 CORONARY ARTERY DISEASE INVOLVING NATIVE CORONARY ARTERY OF NATIVE HEART WITHOUT ANGINA PECTORIS: ICD-10-CM

## 2022-05-11 DIAGNOSIS — I73.9 PAD (PERIPHERAL ARTERY DISEASE) (HCC): ICD-10-CM

## 2022-05-11 DIAGNOSIS — G47.33 OSA (OBSTRUCTIVE SLEEP APNEA): ICD-10-CM

## 2022-05-11 PROCEDURE — G8427 DOCREV CUR MEDS BY ELIG CLIN: HCPCS | Performed by: INTERNAL MEDICINE

## 2022-05-11 PROCEDURE — G8417 CALC BMI ABV UP PARAM F/U: HCPCS | Performed by: INTERNAL MEDICINE

## 2022-05-11 PROCEDURE — 1036F TOBACCO NON-USER: CPT | Performed by: INTERNAL MEDICINE

## 2022-05-11 PROCEDURE — 4040F PNEUMOC VAC/ADMIN/RCVD: CPT | Performed by: INTERNAL MEDICINE

## 2022-05-11 PROCEDURE — 99214 OFFICE O/P EST MOD 30 MIN: CPT | Performed by: INTERNAL MEDICINE

## 2022-05-11 PROCEDURE — 93000 ELECTROCARDIOGRAM COMPLETE: CPT | Performed by: INTERNAL MEDICINE

## 2022-05-11 PROCEDURE — 1123F ACP DISCUSS/DSCN MKR DOCD: CPT | Performed by: INTERNAL MEDICINE

## 2022-05-11 RX ORDER — DILTIAZEM HYDROCHLORIDE 180 MG/1
180 CAPSULE, COATED, EXTENDED RELEASE ORAL DAILY
Qty: 30 CAPSULE | Refills: 3 | Status: SHIPPED | OUTPATIENT
Start: 2022-05-11 | End: 2022-09-02

## 2022-05-16 DIAGNOSIS — I10 ESSENTIAL HYPERTENSION: Chronic | ICD-10-CM

## 2022-05-17 RX ORDER — LISINOPRIL 5 MG/1
TABLET ORAL
Qty: 90 TABLET | Refills: 3 | Status: SHIPPED | OUTPATIENT
Start: 2022-05-17

## 2022-05-17 NOTE — TELEPHONE ENCOUNTER
Last OV: 5/11/22  Next OV: 8/11/22  Last refill:4/28/21  Most recent Labs: 3/28/22  Last EKG (if needed):5/12/22

## 2022-06-27 PROBLEM — R23.3 BLEEDING INTO THE SKIN: Status: ACTIVE | Noted: 2022-06-27

## 2022-06-28 PROBLEM — N18.30 CHRONIC RENAL DISEASE, STAGE III (HCC): Status: ACTIVE | Noted: 2022-06-28

## 2022-08-09 RX ORDER — TORSEMIDE 20 MG/1
TABLET ORAL
Qty: 180 TABLET | Refills: 2 | Status: SHIPPED | OUTPATIENT
Start: 2022-08-09

## 2022-08-25 ENCOUNTER — OFFICE VISIT (OUTPATIENT)
Dept: CARDIOLOGY CLINIC | Age: 82
End: 2022-08-25
Payer: MEDICARE

## 2022-08-25 VITALS
WEIGHT: 190 LBS | HEART RATE: 81 BPM | SYSTOLIC BLOOD PRESSURE: 124 MMHG | HEIGHT: 70 IN | OXYGEN SATURATION: 97 % | DIASTOLIC BLOOD PRESSURE: 70 MMHG | BODY MASS INDEX: 27.2 KG/M2

## 2022-08-25 DIAGNOSIS — E78.5 HYPERLIPIDEMIA LDL GOAL <70: ICD-10-CM

## 2022-08-25 DIAGNOSIS — G47.33 OSA (OBSTRUCTIVE SLEEP APNEA): ICD-10-CM

## 2022-08-25 DIAGNOSIS — I50.32 CHRONIC DIASTOLIC CHF (CONGESTIVE HEART FAILURE), NYHA CLASS 2 (HCC): Primary | ICD-10-CM

## 2022-08-25 DIAGNOSIS — I73.9 PAD (PERIPHERAL ARTERY DISEASE) (HCC): ICD-10-CM

## 2022-08-25 DIAGNOSIS — I25.10 CORONARY ARTERY DISEASE INVOLVING NATIVE CORONARY ARTERY OF NATIVE HEART WITHOUT ANGINA PECTORIS: ICD-10-CM

## 2022-08-25 DIAGNOSIS — I48.20 CHRONIC ATRIAL FIBRILLATION (HCC): ICD-10-CM

## 2022-08-25 PROCEDURE — 1123F ACP DISCUSS/DSCN MKR DOCD: CPT | Performed by: INTERNAL MEDICINE

## 2022-08-25 PROCEDURE — G8427 DOCREV CUR MEDS BY ELIG CLIN: HCPCS | Performed by: INTERNAL MEDICINE

## 2022-08-25 PROCEDURE — 1036F TOBACCO NON-USER: CPT | Performed by: INTERNAL MEDICINE

## 2022-08-25 PROCEDURE — G8417 CALC BMI ABV UP PARAM F/U: HCPCS | Performed by: INTERNAL MEDICINE

## 2022-08-25 PROCEDURE — 93000 ELECTROCARDIOGRAM COMPLETE: CPT | Performed by: INTERNAL MEDICINE

## 2022-08-25 PROCEDURE — 99214 OFFICE O/P EST MOD 30 MIN: CPT | Performed by: INTERNAL MEDICINE

## 2022-08-25 NOTE — PROGRESS NOTES
Wt Readings from Last 2 Encounters:   08/25/22 190 lb (86.2 kg)   06/28/22 197 lb (89.4 kg)     General Appearance:  Alert, cooperative, no distress, appears stated age   Head:  Normocephalic, without obvious abnormality, atraumatic   Eyes:  PERRL, conjunctiva/corneas clear       Nose: Nares normal, no drainage or sinus tenderness   Throat: Lips, mucosa, and tongue normal   Neck: Supple, symmetrical, trachea midline, no adenopathy, thyroid: not enlarged, symmetric, no tenderness/mass/nodules, no carotid bruit or JVD       Lungs:    Inspiratory wheezes on the right    Heart:  Irreg irreg, normal S1/S2, no M/R/G      Abdomen:   Soft, non-tender, bowel sounds active all four quadrants,  no masses, no organomegaly   Extremities: Extremities normal, atraumatic, no cyanosis or edema   Pulses: Carotid      L   2      R2  Radial       L    2     R2     Skin: Petechial rash on bilateral lower legs above the sock line up to the knee   Pysch: Normal mood and affect   Neurologic: Normal     Pulses: Carotid      L   2     R2  Radial       L   2     R2  Femoral    L   2     R2  Popliteal    L   2     R2  DP            L   1 +doppler      R0 +doppler  PT             L   1 +dopple     R1 +doppler(weak)       Lab Results   Component Value Date/Time    TRIG 195 03/28/2022 02:53 PM    HDL 53 03/28/2022 02:53 PM    LDLCALC 100 03/28/2022 02:53 PM    LABVLDL 39 03/28/2022 02:53 PM     Lab Results   Component Value Date/Time     03/28/2022 02:53 PM    K 5.0 03/28/2022 02:53 PM    K 4.6 10/30/2018 04:22 PM    BUN 17 03/28/2022 02:53 PM    CREATININE 1.3 03/28/2022 02:53 PM    No components found for: HGBA1C    Lab Results   Component Value Date/Time    TSH 2.48 03/28/2022 02:53 PM     Personally reviewed and interpreted   ECG 4/12/18: Sinus Bradycardia, QTc 433ms          ECG 6/20/19: Sinus bradycardia  ECG 12/12/19: Sinus rhythm   ECG 11/19/20: Atrial fibrillation   ECG 12/10/20: Sinus rhythm   ECG 6/24/21: Atrial fibrillation ECG 9/30/21: Atrial fibrillation 84 bpm   ECG 5/11/22: Atrial fibrillation with RVR at 123 bpm   ECG 8/25/22: Atrial fibrillation at 78 bpm       Procedures:     [facility] 11/13/2012 (Dr. Faye Coles)   LV wall motion: Normal LV wall motion  LV gram - 60% EF   Normal EDP  LMCA distal 75% lesion with disruption and possible mobile plaque   Proximal 80% LAD densely calcified lesion   Circumflex: entire artery has luminal irregularities, non-dominate  RCA: Proximal widely patent in previously placed stent; entire vessel has luminal irregularities; Mid 30% tapering lesion with arteriomegaly. CABG 11/14/2012 (Dr. Chris Camp)   1. Transesophageal echocardiogram.  2.  Endoscopic vein harvesting. 3.  Urgent coronary artery bypass graft x2 (left internal mammary artery to  left anterior descending, saphenous vein graft to obtuse marginal 1). DCCV 11/25/20  FINDINGS:  Successful synchronized cardioversion with a single 200-joule  biphasic cardioversion shock. The patient converted from atrial  fibrillation to a normal sinus rhythm. Imaging:     Echo 8/20/14  Normal left ventricular size and systolic function. Ejection fraction is visually estimated to be 50%. Normal right ventricular size and function. The left atrium is mildly dilated. There is mild tricuspid regurgitation with RVSP estimated at 32 mmHg. CT head wo contrast 10/10/2015  No acute intracranial abnormality. Carotid Duplex 10/15/2015  The right internal carotid artery appears to have a 1-15% diameter reducing   stenosis based on velocity criteria. The right vertebral artery demonstrates normal antegrade flow. The left internal carotid artery appears to have a 1-15% diameter reducing   stenosis based on velocity criteria. The left vertebral artery demonstrates normal antegrade flow. Echo 10/15/15  Left ventricle size is normal. Normal left ventricular wall thickness. Ejection fraction is visually estimated to be 55-60 %.    No regional wall motion abnormalities are noted. Normal diastolic function. The aortic valve is thickened/calcified with decreased leaflet mobility. The aortic valve area is calculated at 1.4 cm2 with a maximum pressure  gradient of 28 mmHg and a mean pressure gradient of 10 mmHg. This is c/w mild aortic stenosis. Mild aortic regurgitation is present. There is mild tricuspid regurgitation with RVSP estimated at 34 mmHg. The right atrium is mildly dilated. Echo 10/28/16  LVH with normal systolic function. EF  70%. The left atrium is normal in size. Trace to mild mitral regurgitation is present. Mild aortic regurgitation is present. Normal right ventricular size and function. Carotid duplex 7/12/19  The right internal carotid artery appears to have a <50% diameter reducing    stenosis based on velocity criteria. The right vertebral artery demonstrates normal antegrade flow. Previous left carotid endarterectomy. The left internal carotid artery demonstrates no significant stenosis. The left vertebral artery demonstrates normal antegrade flow. Assessment:  1. Chronic diastolic CHF, class 2  2. Atrial fibrillation, chronic   3. PAD  4. CAD of native coronary arteries without angina-CABG 2012  5. Hyperlipidemia with goal LDL<70 mg/dL  6. EDSON with CPAP therapy    Plan:   He is not endorsing symptoms representing angina and is overall well compensated on exam. He continues in rate controlled atrial fibrillation by EKG and will remain on Xarelto 20 mg daily. His lipid profile was not to goal but was previously controlled on his current medical therapy. I will have him repeat a fasting lipid profile in 3 months to assess control. I have encouraged him to monitor his sodium intake as well as daily weights and call with signs of fluid retention. I have personally reviewed all previous testing for this visit today including imaging, lab results and EKG as detailed above.      I will see him in the office for follow up in 1 year. This note was scribed in the presence of George Rodriguez MD by [employer], RN. Physician Attestation:  The scribes documentation has been prepared under my direction and personally reviewed by me in its entirety. I, Dr. Radha Koroma personally performed the services described in this documentation as scribed by my RN in my presence, and I confirm that the note above accurately reflects all work, treatment, procedures, and medical decision making performed by me.

## 2022-09-02 RX ORDER — ROSUVASTATIN CALCIUM 40 MG/1
TABLET, COATED ORAL
Qty: 90 TABLET | Refills: 3 | Status: SHIPPED | OUTPATIENT
Start: 2022-09-02

## 2022-09-02 RX ORDER — DILTIAZEM HYDROCHLORIDE 180 MG/1
CAPSULE, COATED, EXTENDED RELEASE ORAL
Qty: 30 CAPSULE | Refills: 3 | Status: SHIPPED | OUTPATIENT
Start: 2022-09-02

## 2022-10-10 PROBLEM — S62.90XA HAND FRACTURE: Status: ACTIVE | Noted: 2022-10-10

## 2022-10-11 PROBLEM — M72.0 DUPUYTREN CONTRACTURE: Status: ACTIVE | Noted: 2022-10-11

## 2022-10-13 ENCOUNTER — INITIAL CONSULT (OUTPATIENT)
Dept: SURGERY | Age: 82
End: 2022-10-13

## 2022-10-13 VITALS
BODY MASS INDEX: 27.92 KG/M2 | WEIGHT: 195 LBS | SYSTOLIC BLOOD PRESSURE: 142 MMHG | DIASTOLIC BLOOD PRESSURE: 70 MMHG | HEIGHT: 70 IN

## 2022-10-13 DIAGNOSIS — L72.3 SEBACEOUS CYST: Primary | ICD-10-CM

## 2022-10-13 PROCEDURE — 99024 POSTOP FOLLOW-UP VISIT: CPT | Performed by: SURGERY

## 2022-10-13 NOTE — PROGRESS NOTES
Chanel Young (:  1940) is a 78 y.o. male,New patient, here for evaluation of the following chief complaint(s):  New Patient (Pt is here today, sent by Dr Eve Cabrera, for a cyst on his mid back. )         ASSESSMENT/PLAN:  1. Sebaceous cyst      Removal next week in office, hold Xarelto         Subjective   SUBJECTIVE/OBJECTIVE:  HPI  4 cm cyst on mid back now causing pain. Seems to be increasing in size. For removal in office but prefer to be off Xarelto. Will arrange next week. Review of Systems       Objective   Physical Exam       Electronically signed by Darrell Claude, MD on 10/13/2022 at 10:54 AM    No charge visit today      An electronic signature was used to authenticate this note.     --Darrell Claude, MD

## 2022-10-17 ENCOUNTER — OFFICE VISIT (OUTPATIENT)
Dept: SURGERY | Age: 82
End: 2022-10-17
Payer: MEDICARE

## 2022-10-17 DIAGNOSIS — D17.1 LIPOMA OF TORSO: ICD-10-CM

## 2022-10-17 DIAGNOSIS — L30.9 DERMATITIS: Primary | ICD-10-CM

## 2022-10-17 PROCEDURE — 99999 PR OFFICE/OUTPT VISIT,PROCEDURE ONLY: CPT | Performed by: SURGERY

## 2022-10-17 PROCEDURE — 21930 EXC BACK LES SC < 3 CM: CPT | Performed by: SURGERY

## 2022-10-17 RX ORDER — DIAPER,BRIEF,INFANT-TODD,DISP
EACH MISCELLANEOUS
Qty: 30 G | Refills: 1 | Status: SHIPPED | OUTPATIENT
Start: 2022-10-17 | End: 2022-10-24

## 2022-10-17 NOTE — PROGRESS NOTES
Shirley Willingham (:  1940) is a 78 y.o. male,Established patient, here for evaluation of the following chief complaint(s):  Follow-up (Pt is here today to have a cyst removed. )         ASSESSMENT/PLAN:  1. Dermatitis  2. Lipoma of torso  -     FL EXCISION TUMOR SOFT TISSUE BACK/FLANK SUBQ <3CM      Suture removal in two weeks         Subjective   SUBJECTIVE/OBJECTIVE:  HPI  Presents for removal of a mass on his mid back. Lipoma vs sebaceous cyst on exam. Reports slow increase in size and now pain with direct pressure, sitting in a chair or lying on his back. Recommend removal. The risks, benefits and alternatives to the planned procedure were discussed. Patient expressed an understanding and is willing to proceed. Procedure  Under sterile conditions, lidocaine infused. A 3 cm incision made on mid back and a 2.5 cm fatty lesion was removed from the subcutaneous layer. Hemostasis was obtained with direct pressure and wound closed with 3-0 nylons. No immediate complications noted. Dressing placed. No specimen sent. Review of Systems       Objective   Physical Exam       Electronically signed by Cornelius Chauhan MD on 10/17/2022 at 2:14 PM        An electronic signature was used to authenticate this note.     --Cornelius Chauhan MD

## 2022-10-28 ENCOUNTER — OFFICE VISIT (OUTPATIENT)
Dept: ORTHOPEDIC SURGERY | Age: 82
End: 2022-10-28
Payer: MEDICARE

## 2022-10-28 VITALS — BODY MASS INDEX: 27.92 KG/M2 | HEIGHT: 70 IN | WEIGHT: 195 LBS | RESPIRATION RATE: 16 BRPM

## 2022-10-28 DIAGNOSIS — M72.0 DUPUYTREN'S CONTRACTURE: Primary | ICD-10-CM

## 2022-10-28 PROCEDURE — 99203 OFFICE O/P NEW LOW 30 MIN: CPT | Performed by: PHYSICIAN ASSISTANT

## 2022-10-28 PROCEDURE — G8484 FLU IMMUNIZE NO ADMIN: HCPCS | Performed by: PHYSICIAN ASSISTANT

## 2022-10-28 PROCEDURE — G8417 CALC BMI ABV UP PARAM F/U: HCPCS | Performed by: PHYSICIAN ASSISTANT

## 2022-10-28 PROCEDURE — G8427 DOCREV CUR MEDS BY ELIG CLIN: HCPCS | Performed by: PHYSICIAN ASSISTANT

## 2022-10-28 PROCEDURE — 1123F ACP DISCUSS/DSCN MKR DOCD: CPT | Performed by: PHYSICIAN ASSISTANT

## 2022-10-28 PROCEDURE — 1036F TOBACCO NON-USER: CPT | Performed by: PHYSICIAN ASSISTANT

## 2022-10-28 NOTE — PROGRESS NOTES
evident over the dorsum of the Middle Finger, Ring Finger, and Small Finger on the Left, normal on the Right. Wrist range of motion is Full bilaterally  Sensation is normal bilaterally. There is no evidence of gross joint instability bilaterally. Muscular strength is clinically appropriate bilaterally. Vascular examination reveals normal, good capillary refill, and good color bilaterally. There is no Swelling of the digits bilaterally. There is a palpable 2 cm firm hard cord on the palm in the axis of the left Ring Finger and Small Finger  with involvement of the Small Finger  to the level of the MCP Joint. There are nodules in the palm of the right middle finger and ring finger. Finger joint range of motion is abnormal, with a flexed position of the left Small Finger MCP Joint measuring 35 degrees & PIP Joint measuring 45 degrees. The \"Table Top Test\" is positive. All other fingers and joints show normal motion bilaterally      Impression:  Mr. Heraclio Coleman has developed Dupuytren's Contracture, which is currently of greater concern, and presents requesting further treatment. Plan:  After discussion of the treatment options available for Dupuytren's Contracture and review of his past treatments, I have outline for Mr. Heraclio Coleman the non-surgical treatment options of Clostridial Collagenase (Xiaflex) injection and subsequent Digital Manipulation, we have also discussed the surgical options of palmar and digital fasciectomy. We have discussed the details of the various procedures and the pre, marty and postinjection concerns and appropriate expectations after collagenase injection, as well as the pre, marty and postsurgical concerns and appropriate expectations after surgical treatment.   We specifically discussed the possibility of recurrence, stiffness and residual discomfort as well as the possibility of transient or permanent tendon, neurological, or vascular dysfunction or injury related to both treatment options. He was given opportunity to ask questions and had them answered fully to his satisfaction. He voiced an understanding of the procedure and did express interest in left Small Finger Clostridial Collagenase (Xiaflex) injection and subsequent Digital Manipulation. We have agreed to investigate the feasibility of proceeding with Clostridial Collagenase (Xiaflex) injection, and Mr. Jameel Andrade has provided consent to submit his information and insurance details to the [employer]" for the purposes of verification of insurance coverage for this procedure. He will follow up with me over the next 1 - 2 weeks to further discuss the findings of his insurance verification process and to discuss and plan the next step for the treatment of his Dupuytren's Contracture. Mr. Jameel Andrade has been given a full verbal list of instructions and precautions related to his present condition. I have asked him to followup with me in the office at the prescribed time. He is also specifically requested to call or return to the office sooner if his symptoms change or worsen prior to the next scheduled appointment. I have also discussed with Mr. Jameel Andrade the other treatment options available to him for this condition. We have today selected to proceed with Surgical treatment. He has voiced and  understanding that there are other less aggressive treatment options which are available in this situation, albeit possibly less efficacious or durable, and he is comfortable with the plan that he has chosen. Mr. Jameel Andrade has been given a full verbal list of instructions and precautions related to his present condition. I have asked him to followup with me in the office at the prescribed time. He is also specifically requested to call or return to the office sooner if his symptoms change or worsen prior to the next scheduled appointment.

## 2022-10-31 ENCOUNTER — OFFICE VISIT (OUTPATIENT)
Dept: SURGERY | Age: 82
End: 2022-10-31

## 2022-10-31 DIAGNOSIS — D17.1 LIPOMA OF TORSO: Primary | ICD-10-CM

## 2022-10-31 PROCEDURE — 99024 POSTOP FOLLOW-UP VISIT: CPT | Performed by: SURGERY

## 2022-11-04 NOTE — PROGRESS NOTES
Adelia Brooks (:  1940) is a 78 y.o. male,Established patient, here for evaluation of the following chief complaint(s):  Post-Op Check (Pt is here today to have sutures removed. )         ASSESSMENT/PLAN:  1. Lipoma of torso    Follow up with me as needed           Subjective   SUBJECTIVE/OBJECTIVE:  HPI  Doing well from lipoma removal. Sutures out today, well healed. 20 mls seroma aspirated today with good result. Follow up with me as needed    Review of Systems       Objective   Physical Exam       Electronically signed by Sarah Perdomo MD on 10/31/2022 at 1:19 PM        An electronic signature was used to authenticate this note.     --Sarah Perdomo MD Re-Assessment / Re-Certification  [address], [address], [place] [address]      Patient: Everton Murguia   : 1951  Diagnosis/ICD-10 Code:  Stiffness of left knee [M25.662]  Referring practitioner: KENDRA Luevano  Date of Initial Visit: 2022  Today's Date: 2022  Patient seen for 12 sessions      Subjective:   Subjective Questionnaire: LEFS: 69/80  Clinical Progress: improved  Home Program Compliance: Yes  Treatment has included: therapeutic exercise, neuromuscular re-education, manual therapy, therapeutic activity and cryotherapy    Subjective Evaluation    History of Present Illness    Subjective comment: My knee is doing really well.  I can stand/walk about an hour or so at a time, and I can go up and down my steps alternating feet.  I am not using the cane at all anymore, and I am doing better walking on unevel ground.  Pain  Current pain ratin  At worst pain ratin         Objective          Active Range of Motion   Left Knee   Flexion: 114 degrees   Extension: 2 degrees   Extensor la degrees     Passive Range of Motion   Left Knee   Flexion: 122 degrees   Extension: 0 degrees     Strength/Myotome Testing     Left Knee   Flexion: 5  Extension: 5  Quadriceps contraction: good    Swelling     Left Knee Girth Measurement (cm)   Joint line: 41.1.      Assessment & Plan     Assessment    Assessment details: Patient has been motivated and compliant with PT interventions.  He consistently reports improving and currently minimal (L) knee symptoms.  His quality of gait has notably improved and he is now able to ambulate community distances on level and unlevel terrain without assistive device and he can negotiate steps within his home reciprocally.  He demonstrates near full (R) knee AROM flexion and extension, and (R) knee PROM is WNL.  His (L) LE strength is functional.  His LEFS score has improved from 36/80 to 69/80, indicating a significant perceived level of functional  improvement.  He has met most established physical therapy goals and at this time demonstrates readiness to transition to independence with progressed HEP, pending surgeon satisfaction with patient functional ability.     Goals  Plan Goals: STGs: ALL MET  1. Patient will be independent with initial HEP - MET  2. Patient will report improved (L) knee symptoms 2-4/10 for improved tolerance to ADLs and prolonged positions - MET  3. Patient will demonstrate improved (L) knee AROM 3-110 deg for improved joint mobility - MET  4. Patient will demonstrate >/= 3.0 cm reduction in (L) knee joint line girth as evidence of resolving inflammation - MET  5. Patient will ambulate moderate community distances with STC and minimal compensation - MET    LTGs: to be met in 4 weeks  1. Patient will be independent with progressed HEP - PARTIALLY MET  2. Patient will report improved (L) knee symptoms 0-2/10 for improved tolerance to functional activities and mobility - PARTIALLY MET  3. Patient will demonstrate improved (L) knee AROM 0-120 deg for improved joint mobility and activity performance - NOT MET  4. Patient will have improved (L) LE strength grossly >/= 4+/5 for improved function - MET  5. Patient will consistently negotiate steps reciprocally and ambulate community distances on level and unlevel terrain without AD - MET  6. Patient will have improved LEFS score >/= 50/80 for subjective evidence of functional improvement - MET      Progress toward previous goals: Partially Met  Recommendations: Discharge - transition to independence with HEP  Timeframe: today  Prognosis to achieve goals: good    PT Signature: Monserrat Carroll, PT, DPT, OCS  [place] License # [id number]      Based upon review of the patient's progress and continued therapy plan, it is my medical opinion that Everton Murguia should continue physical therapy treatment at [facility]  [address] [place] [address] [address]  [facility]  [address]  [phone].    Signature: __________________________________  Angel Bal APRN    Manual Therapy:         mins  65862;  Therapeutic Exercise:    43     mins  07203;     Neuromuscular Alicia:        mins  73584;    Therapeutic Activity:     13     mins  52593;     Gait Training:           mins  07958;     Ultrasound:          mins  54881;    Electrical Stimulation:         mins  85940 ( );  Dry Needling          mins self-pay    Timed Treatment:   56   mins   Total Treatment:     56   mins    Please sign and return via fax to [phone]. Thank you, [facility].

## 2022-11-07 ENCOUNTER — TELEPHONE (OUTPATIENT)
Dept: ORTHOPEDIC SURGERY | Age: 82
End: 2022-11-07

## 2022-11-07 NOTE — TELEPHONE ENCOUNTER
General Question     Subject: PT IS WAITING TO HEAR ABOUT HIS INJECTION. HE IS LEAVING TO GO OUT OF TOWN BY 11/25 AND WOULD LIKE TO GET INJECTION BEFORE THEN. PT REQUEST A RETURN CALL [facility]SA.    Patient and /or Facility [facility] [address] [facility] Number: [phone]

## 2022-11-14 ENCOUNTER — TELEPHONE (OUTPATIENT)
Dept: ORTHOPEDIC SURGERY | Age: 82
End: 2022-11-14

## 2022-11-14 NOTE — TELEPHONE ENCOUNTER
General Question     Subject: Valeria Wilkinson INJECTION   Patient and /or Facility Request: [facility]"  Contact Number: [phone]    PATIENT WOULD LIKE A CALL BACK REGARDING HIS INJECTION. .. PATIENT STATES THAT HE CAME UP TO THE OFFICE TO GET THE INJECTION AND THE MEDICATION WAS NOT IN THE OFFICE. .. PLEASE CALL PATIENT BACK AT THE ABOVE NUMBER. ..

## 2022-11-18 ENCOUNTER — OFFICE VISIT (OUTPATIENT)
Dept: ORTHOPEDIC SURGERY | Age: 82
End: 2022-11-18
Payer: MEDICARE

## 2022-11-18 VITALS — HEIGHT: 70 IN | BODY MASS INDEX: 27.92 KG/M2 | WEIGHT: 195 LBS

## 2022-11-18 DIAGNOSIS — M72.0 DUPUYTREN'S CONTRACTURE: Primary | ICD-10-CM

## 2022-11-18 PROCEDURE — G8484 FLU IMMUNIZE NO ADMIN: HCPCS | Performed by: ORTHOPAEDIC SURGERY

## 2022-11-18 PROCEDURE — G8428 CUR MEDS NOT DOCUMENT: HCPCS | Performed by: ORTHOPAEDIC SURGERY

## 2022-11-18 PROCEDURE — 1123F ACP DISCUSS/DSCN MKR DOCD: CPT | Performed by: ORTHOPAEDIC SURGERY

## 2022-11-18 PROCEDURE — G8417 CALC BMI ABV UP PARAM F/U: HCPCS | Performed by: ORTHOPAEDIC SURGERY

## 2022-11-18 PROCEDURE — 20527 NJX NZM PALMAR FASCIAL CORD: CPT | Performed by: ORTHOPAEDIC SURGERY

## 2022-11-18 PROCEDURE — 99213 OFFICE O/P EST LOW 20 MIN: CPT | Performed by: ORTHOPAEDIC SURGERY

## 2022-11-18 PROCEDURE — 1036F TOBACCO NON-USER: CPT | Performed by: ORTHOPAEDIC SURGERY

## 2022-11-18 NOTE — Clinical Note
Dear  Pat Zaidi MD,  Thank you very much for your referral or Mr. Sophia Villanueva to me for evaluation and treatment of his Hand & Wrist condition. I appreciate your confidence in me and thank you for allowing me the opportunity to care for your patients. If I can be of any further assistance to you on this or any other patient, please do not hesitate to contact me. Sincerely,  Vannesa Mart.  Seth Luong MD

## 2022-11-18 NOTE — LETTER
CONSENT TO INJECTION  AND/OR OTHER PROCEDURE(S)        PATIENT : Heraclio Coleman   YOB: 1940        DATE : 11/18/22         1. I request and consent that Dr. Melissa Hernandez. Be Avila,  and/or his associates or assistants perform an operation and/or procedures on the above patient , to treat the condition(s) which appear indicated by the diagnostic studies already performed. I have been told that in general terms the nature, purpose and reasonable expectations of the operation and/or procedure(s) are:         Injection of Xiaflex (collagenase clostridium histolyticum) into left Ring Finger and Small Finger, subsequent extension manipulation procedure to same finger under local anesthetic approximately one day later, and splinting of same finger after the manipulation procedure        2. It has been explained to me by the informing physician that during the course of the operation and/or procedure(s) unforeseen conditions may be revealed that necessitate an extension of the original operation and/or procedure(s) or different operation and/or procedures than those set forth in Paragraph 1. I therefore authorize and request that my physician and/or his associates or assistants perform such operations and/or procedures as are necessary and desirable in the exercise of professional judgment. The authority granted under this Paragraph 2 shall extend to all conditions that require treatment and are known to my physician at the time the operation is commenced. 3. I have been made aware by the informing physician of certain risks and consequences that are associated with the operation and/or procedure(s) described in Paragraph 1, the reasonable alternative methods or treatment, the possible consequences, the possibility that the operation and/or procedure(s) may be unsuccessful and the possibility of complications.   I understand the reasonably known risks to be:        - Bleeding, Infection  - Bruising, Swelling, Poor Healing  - Possible Damage to Skin, Nerve, Vessel, Tendon/Muscle or Bone  - Need for further Treatment and/or Surgery  - Stiffness, Pain  - Failure to Rupture Cord or Failure to Correct Deformity  - Residual or Recurrent Symptoms  - Possible Allergic Reaction to Medication or Lymph Node Swelling  - Anesthetic and/or Medical Risks          4. I have also been informed by the informing physician that there are other risks from both known and unknown causes that are attendant to the performance of any surgical procedure. I am aware that the practice of medicine and surgery is not an exact science, and that no guarantees have been made to me concerning the results of the operation and/or procedure(s). 5. I consent to the taking of photographs before, during and/or after the operation or procedure for scientific/educational purposes. 6. I consent to the administration of anesthesia and to the use of such anesthetics as may be deemed advisable by my physician. 7. I certify that I have read and understand the above consent to operation and/or other procedure(s); that the explanations therein referred to were made to me by the informing physician in advance of my signing this consent; that all blanks or statements requiring insertion or completion were filled in and inapplicable paragraphs, if any, were stricken before I signed; and that all questions asked by me about the operation and/or procedure(s) which I have consented to have been fully answered in a satisfactory manner.               _______________________  Witness        Signature Of Patient          Ela Padilla. Jinger Cockayne      _______________________  Informing Physician         Signature of Informing Physician           If patient is unable to sign or is a minor, complete one of the following:    (A)  Patient is a minor   years of age. (B)  Patient is unable to sign because:            The undersigned represents that he or she is duly authorized to execute this consent for and on behalf of the above named patient.                Witness               o  Parent  o  Guardian   o  Spouse       o  Other (specify)

## 2022-11-19 NOTE — PATIENT INSTRUCTIONS
Thank you for choosing [facility]) Physicians for your Hand and Upper Extremity needs. If we can be of any further assistance to you, please do not hesitate to contact us.     Office Phone Number:  [phone]  or  [phone]

## 2022-11-19 NOTE — PROGRESS NOTES
Mr. Raghav Lambert  returns today regarding left  Small Finger Dupuytren's Contracture for which he was last evaluated in October 2022. Treatment up to this time has included: No prior treatment. He has noted the size of the palmar thickening has been stable with time. He has noted any limitation of motion of the Small Finger; He does not report any discomfort associated with his presenting concern. I have today reviewed with Raghav Lambert the clinically relevant, past medical history, medications, allergies,  family history, social history, and Review Of Systems & I have documented any details relevant to today's presenting complaints in my history above. Mr. Steele Favorite self-reported past medical history, medications, allergies,  family history, social history, and Review Of Systems have been scanned into the chart under the \"Media\" tab. Physical Exam:  Vitals  Height: 5' 10\" (177.8 cm)  Weight: 195 lb (88.5 kg)  Mr. Raghav Lambert appears well, he is in no apparent distress, he demonstrates appropriate mood & affect. Skin: Normal Color, Free of Lesions, and Clinical evidence of Dupuytren's Contracture both sides   Thickening with cord formation is noted in the palm along the axis of the left  Small Finger. There is  extension into the digit itself. There is a flexion contracture of the left Small Finger MP joint of 20 degrees & of the left Small Finger PIP joint of 55 degrees. All other digital range of motion is unchanged from prior examination bilaterally  Wrist range of motion is without significant limitation bilaterally  There is no evidence of gross joint instability bilaterally. Sensation is present bilaterally  Vascular examination reveals normal and good capillary refill bilaterally. There is no Swelling bilaterally. Muscular strength is clinically appropriate bilaterally.     There is a 3 cm firm hard cord on the palm in the axis of the left Small Finger  with extension into the Small Finger  to the level of the Proximal Interphalangeal Joint. The \"Table Top Test\" is positive. Impression:  Mr. Jameel Andrade is showing clinical evidence of Dupuytren's Contracture, and presents requesting further treatment. Plan:    After discussion of the treatment options available for Dupuytren's Contracture and review of his past treatments, I have outline for Mr. Jameel Andrade the non-surgical treatment options of Clostridial Collagenase (Xiaflex) injection and subsequent Digital Manipulation. We have discussed the details of the procedures and the pre, marty and postinjection concerns and appropriate expectations after this procedure. We specifically discussed the possibility of recurrence, stiffness and residual discomfort as well as the possibility of transient or permanent tendon, neurological, or vascular dysfunction or injury. He was given opportunity to ask questions and had them answered fully to his satisfaction. He voiced an understanding of the procedure and did wish to proceed with left Small Finger Clostridial Collagenase (Xiaflex) injection and subsequent Digital Manipulation. I had an extensive discussion with Mr. Jameel Andrade and any family members present regarding the natural history, etiology, and long term consequences of this problem. I have outlined a treatment plan with them and, in my opinion, this intervention is indicated at this time. I have discussed with them the potential complications, limitations, expectations, alternatives, and risks of left Small Finger Clostridial Collagenase (Xiaflex) injection and subsequent Digital Manipulation. He has had full opportunity to ask his questions. I have answered them all to his satisfaction.  I feel that Mr. Jameel Andrade  and any present family members do understand our discussion today and they have provided informed consent for left Small Finger Clostridial Collagenase (Xiaflex) injection and subsequent Digital Manipulation. Procedure:   left Small Finger Xiaflex  [first Injection]: After full discussion of the nature of this process and outlining a treatment plan with Mr. Heraclio Coleman, he has provided written informed consent to proceed. We discussed the complications, limitations, expectations, alternatives, and risks of injection of Xiaflex (collagenase clostridium histolyticum) into the palpable Dupuytren's Cord. I have explained the potential for bleeding, infection, potential side effects of the medication, and the remote possibility of damage to surrounding structures as result of the injection. He understood this information well and verbally consented to this treatment. The Xiaflex drug was handled and reconstituted in strict accordance with the prescribing Information (PI). The skin of the symptomatic digit was prepped with Isopropyl Alcohol and under aseptic conditions the palpable Dupuytren's Cord was injected at it's site of maximum separation from the underlying flexor tendons. The specified syringe was utilzed to inject the cord with 0.58 mg of reconstituted Xiaflex distributed over a region of 3-4 mm of the cord. Appropriate resistance was felt during the injection, there was no evidence of medication being injected outside of the cord or of extravasation of the medication. A dry sterile bandage was applied, the hand was loosely wrapped and the patient tolerated the injection without difficulty. I advised the patient of the expected response, possible reactions and the instructions for care of the hand. He is to refrain from significant use or exersize of the injected hand, he is to elevate the hand and leave the dressing intact. He is instructed in the judicious use of over-the-counter anti-inflammatory medications or pain relievers for his symptoms if allowed by his primary care physician.     He already has a follow-up appointment scheduled for the planned Finger Manipulation Procedure. Mr. Marycarmen Akbar has been given a full verbal list of instructions and precautions related to his present condition and todays injection. I have asked him to followup with me in the office at the prescribed time. He is also specifically requested to call or return to the office sooner if his symptoms change or worsen prior to the next scheduled appointment.

## 2022-11-21 ENCOUNTER — OFFICE VISIT (OUTPATIENT)
Dept: ORTHOPEDIC SURGERY | Age: 82
End: 2022-11-21
Payer: MEDICARE

## 2022-11-21 VITALS — WEIGHT: 195 LBS | HEIGHT: 70 IN | BODY MASS INDEX: 27.92 KG/M2 | RESPIRATION RATE: 15 BRPM

## 2022-11-21 DIAGNOSIS — M72.0 DUPUYTREN'S CONTRACTURE: Primary | ICD-10-CM

## 2022-11-21 PROCEDURE — 26341 MANIPULAT PALM CORD POST INJ: CPT | Performed by: ORTHOPAEDIC SURGERY

## 2022-11-21 PROCEDURE — 29130 APPL FINGER SPLINT STATIC: CPT | Performed by: ORTHOPAEDIC SURGERY

## 2022-11-21 NOTE — Clinical Note
Dear  Son Cazares MD,  Thank you very much for your referral or Mr. Luis Arana to me for evaluation and treatment of his Hand & Wrist condition. I appreciate your confidence in me and thank you for allowing me the opportunity to care for your patients. If I can be of any further assistance to you on this or any other patient, please do not hesitate to contact me. Sincerely,  Fer Castorena.  Navid Velásquez MD

## 2022-11-21 NOTE — PATIENT INSTRUCTIONS
Hand Range of Motion Instructions      Dr. Charanjit Patton    Be cautions in resuming fulll activities and use of the hand for next 2 - 4 weeks. Perform the following exercises VIGOROUSLY at least four times a day. Exercises should be performed in the seated position with elbow on tabletop or other firm surface. If you cannot make these motions on your own, you may use other hand to assist in making these motions. Fully straighten fingers until hand is flat. Fully bend fingers until hand is in a full fist.   Bend wrist forward and backward (grasp hand around knuckles with other hand to do so). Rotate forearm so that your palm faces towards your face. Rotate forearm so that your palm faces away from your face (grasp hand around wrist with other hand to do so). Fully straighten elbow. Fully bend elbow. Continue light use of the hand progressing to more normal us as it feels comfortable to do so. In 2 - 4 weeks you may discontinue using the brace (if you were using one) and resume normal use of the hand and wrist if you have regained full and painless motion and function. If you are unable to achieve  full and painless motion and function over 4 weeks, please call the office at [phone] to schedule a follow-up appointment with Dr. Jolene Barrera. Thank you for choosing [facility]) Physicians for your Hand and Upper Extremity needs. If we can be of any further assistance to you, please do not hesitate to contact us.     Office Phone Number:  [phone]  or  [phone]

## 2022-11-21 NOTE — PROGRESS NOTES
Mr. Crow returns today regarding left sided Small Finger Dupuytren's Contracture for which he underwent Xiaflex injection 3 days ago. He is here today for further evaluation to determine if he is an appropriate candidate for the Finger Manipulation Procedure after his prior Xiaflex Injection. He reports post-injection symptoms to include: moderate swelling, moderate ecchymosis, no neurologic or vascular symptoms, no symptoms related to tendon function, no limitation of active tendon function. He did not note any spontaneous correction of the deformity since injection. The patient's , past medical history, medications, allergies,  family history, social history, and review of systems have been reviewed and are recorded in the chart. Physical Exam:  Vitals  Resp: 15  Height: 5' 10\" (177.8 cm)  Weight: 195 lb (88.5 kg)  Mr. Julia Artis appears well, he is in no apparent distress, he demonstrates appropriate mood & affect. Skin: shows moderate swelling, moderate ecchymosis. no sign of superficial skin necrosis. The contralateral side examines normally. There remains a flexion contracture of the left Small Finger MP joint of 30 degrees & of the PIP joint of 65 degrees. Digital range of motion is intact & unchanged from previous (other than the above noted contractures). FDS function is Intact in the Index Finger, Middle Finger, Ring Finger, and Small Finger, FDP function is Intact in the Index Finger, Middle Finger, Ring Finger, and somewhat difficult to examine in the Small Finger , common extensor function is Intact in the Index Finger, Middle Finger, Ring Finger, and Small Finger. Thumb EPL Function is Intact, Thumb EPB Function is Intact, Thumb FPL Function is Intact. Wrist range of motion is full. Sensation is normal in the Small Finger otherwise normal bilaterally. Vascular examination reveals normal and good capillary refill bilaterally.   There is moderate Swelling in the left Small Finger, moderate Swelling in the palm on the Left, normal on the Right. He demonstrates no gross joint instability bilaterally  There is no muscle atrophy or gross weakness bilaterally       Impression:  Mr. Judi Cleaning returns today after Xiaflex injection to the left Small Finger. Based upon today's evaluation and findings, I believe that he is an appropriate candidate for the Finger Manipulation Procedure after his prior Xiaflex Injection. Procedure:  Post-Xiaflex Injection Finger Manipulation of left Small Finger. Written informed consent for the procedure has been obtained from . Thom Marvintingham in conjunction with the injection phase of his treatment. I have explained to Mr. Judi Cleaning the nature and goals of his treatment. We have discussed the complications, limitations, expectations, alternatives, and risks of this Manipulation Procedure subsequent to yesterday's Xiaflex Injection. We have specifically discussed the risks including but not limited to: skin tear, tendon rupture, failure to achieve correction, injury to nerves and blood vessels, and the possible need for further or alternate treatments. He has voiced a clear understanding of our discussion and has provided written consent to proceed. With his consent, a field block ( 5 ml 1% plain Lidocaine & 5 ml of 0.25% plain Marcaine) was instilled about the previous site of injection. Once the block was established, a gentle finger extension maneuver was performed with the wrist held in a flexed posture. Mr. Judi Cleaning did note an apparent rupture of the cord. Thereafter the flexion contracture of the left Small Finger MP joint measured 0 degrees actively & 0 degrees passively;  the PIP joint measured 30 degrees actively &  20 degrees passively. Complications noted immedialtly after the procedure were none. After manipulation, all FDS & FDP tendon function was tested and found to remain intact.     An appropriately padded and carefully molded finger extension splint was applied to the left Small Finger to assist in maintaining the correction of the deformity. Mr. Ankit Dunlap was given instructions regarding care of the hand, work of edema control and gentle range of motion. A prescription for formal hand therapy was offered and declined by the patient. He is instructed in the judicious use of over-the-counter anti-inflammatory medications or pain relievers for his symptoms if allowed by his primary care physician. I have asked Mr. Ankit Dunlap to schedule a follow-up appointment for 2 weeks from now for re-evaluation of the hand. Mr. Ankit Dunlap has been given a full verbal list of instructions and precautions related to his present condition. I have asked him to followup with me in the office at the prescribed time. He is also specifically requested to call or return to the office sooner if his symptoms change or worsen prior to the next scheduled appointment.

## 2022-11-23 RX ORDER — RIVAROXABAN 20 MG/1
TABLET, FILM COATED ORAL
Qty: 90 TABLET | Refills: 3 | Status: SHIPPED | OUTPATIENT
Start: 2022-11-23

## 2023-01-23 ENCOUNTER — HOSPITAL ENCOUNTER (OUTPATIENT)
Age: 83
Discharge: HOME | End: 2023-01-23

## 2023-01-23 ENCOUNTER — HOSPITAL ENCOUNTER (OUTPATIENT)
Dept: GENERAL RADIOLOGY | Age: 83
Discharge: HOME | End: 2023-01-23

## 2023-01-23 DIAGNOSIS — I50.32 CHRONIC DIASTOLIC CHF (CONGESTIVE HEART FAILURE), NYHA CLASS 2 (HCC): ICD-10-CM

## 2023-01-23 DIAGNOSIS — J44.9 COPD WITH ASTHMA (HCC): ICD-10-CM

## 2023-01-23 DIAGNOSIS — I48.20 CHRONIC ATRIAL FIBRILLATION (HCC): ICD-10-CM

## 2023-01-24 ENCOUNTER — HOSPITAL ENCOUNTER (OUTPATIENT)
Age: 83
Discharge: HOME | End: 2023-01-24
Payer: MEDICARE

## 2023-01-24 ENCOUNTER — HOSPITAL ENCOUNTER (OUTPATIENT)
Dept: GENERAL RADIOLOGY | Age: 83
Discharge: HOME | End: 2023-01-24
Payer: MEDICARE

## 2023-01-24 DIAGNOSIS — R73.09 ABNORMAL GLUCOSE: ICD-10-CM

## 2023-01-24 DIAGNOSIS — E78.2 MIXED HYPERLIPIDEMIA: ICD-10-CM

## 2023-01-24 DIAGNOSIS — I48.20 CHRONIC ATRIAL FIBRILLATION (HCC): ICD-10-CM

## 2023-01-24 DIAGNOSIS — I50.32 CHRONIC DIASTOLIC CHF (CONGESTIVE HEART FAILURE), NYHA CLASS 2 (HCC): ICD-10-CM

## 2023-01-24 LAB
A/G RATIO: 2.1 (ref 1.1–2.2)
ALBUMIN SERPL-MCNC: 4.6 G/DL (ref 3.4–5)
ALP BLD-CCNC: 95 U/L (ref 40–129)
ALT SERPL-CCNC: 13 U/L (ref 10–40)
ANION GAP SERPL CALCULATED.3IONS-SCNC: 16 MMOL/L (ref 3–16)
AST SERPL-CCNC: 20 U/L (ref 15–37)
BASOPHILS ABSOLUTE: 0 K/UL (ref 0–0.2)
BASOPHILS RELATIVE PERCENT: 0.5 %
BILIRUB SERPL-MCNC: <0.2 MG/DL (ref 0–1)
BUN BLDV-MCNC: 17 MG/DL (ref 7–20)
CALCIUM SERPL-MCNC: 9.3 MG/DL (ref 8.3–10.6)
CHLORIDE BLD-SCNC: 101 MMOL/L (ref 99–110)
CHOLESTEROL, TOTAL: 132 MG/DL (ref 0–199)
CO2: 22 MMOL/L (ref 21–32)
CREAT SERPL-MCNC: 1.2 MG/DL (ref 0.8–1.3)
EOSINOPHILS ABSOLUTE: 0 K/UL (ref 0–0.6)
EOSINOPHILS RELATIVE PERCENT: 0.5 %
GFR SERPL CREATININE-BSD FRML MDRD: 59 ML/MIN/{1.73_M2}
GLUCOSE BLD-MCNC: 135 MG/DL (ref 70–99)
HCT VFR BLD CALC: 30.7 % (ref 40.5–52.5)
HDLC SERPL-MCNC: 44 MG/DL (ref 40–60)
HEMOGLOBIN: 9.4 G/DL (ref 13.5–17.5)
LDL CHOLESTEROL CALCULATED: 64 MG/DL
LYMPHOCYTES ABSOLUTE: 1 K/UL (ref 1–5.1)
LYMPHOCYTES RELATIVE PERCENT: 22.3 %
MCH RBC QN AUTO: 26 PG (ref 26–34)
MCHC RBC AUTO-ENTMCNC: 30.7 G/DL (ref 31–36)
MCV RBC AUTO: 84.7 FL (ref 80–100)
MONOCYTES ABSOLUTE: 0.4 K/UL (ref 0–1.3)
MONOCYTES RELATIVE PERCENT: 8.1 %
NEUTROPHILS ABSOLUTE: 3 K/UL (ref 1.7–7.7)
NEUTROPHILS RELATIVE PERCENT: 68.6 %
PDW BLD-RTO: 16.1 % (ref 12.4–15.4)
PLATELET # BLD: 239 K/UL (ref 135–450)
PMV BLD AUTO: 7.8 FL (ref 5–10.5)
POTASSIUM SERPL-SCNC: 4.5 MMOL/L (ref 3.5–5.1)
PRO-BNP: 1028 PG/ML (ref 0–449)
RBC # BLD: 3.62 M/UL (ref 4.2–5.9)
SODIUM BLD-SCNC: 139 MMOL/L (ref 136–145)
TOTAL PROTEIN: 6.8 G/DL (ref 6.4–8.2)
TRIGL SERPL-MCNC: 121 MG/DL (ref 0–150)
TSH SERPL DL<=0.05 MIU/L-ACNC: 1.19 UIU/ML (ref 0.27–4.2)
VLDLC SERPL CALC-MCNC: 24 MG/DL
WBC # BLD: 4.4 K/UL (ref 4–11)

## 2023-01-24 PROCEDURE — 71046 X-RAY EXAM CHEST 2 VIEWS: CPT

## 2023-01-25 LAB
ESTIMATED AVERAGE GLUCOSE: 125.5 MG/DL
HBA1C MFR BLD: 6 %

## 2023-02-28 PROBLEM — J06.9 URTI (ACUTE UPPER RESPIRATORY INFECTION): Status: ACTIVE | Noted: 2023-02-28

## 2023-03-30 ENCOUNTER — OFFICE VISIT (OUTPATIENT)
Dept: ENT CLINIC | Age: 83
End: 2023-03-30
Payer: MEDICARE

## 2023-03-30 VITALS
HEART RATE: 140 BPM | BODY MASS INDEX: 28.35 KG/M2 | OXYGEN SATURATION: 100 % | DIASTOLIC BLOOD PRESSURE: 73 MMHG | HEIGHT: 70 IN | WEIGHT: 198 LBS | SYSTOLIC BLOOD PRESSURE: 144 MMHG

## 2023-03-30 DIAGNOSIS — M95.0 NASAL VALVE COLLAPSE: ICD-10-CM

## 2023-03-30 DIAGNOSIS — R09.81 NASAL CONGESTION: Primary | ICD-10-CM

## 2023-03-30 DIAGNOSIS — J34.3 HYPERTROPHY OF INFERIOR NASAL TURBINATE: ICD-10-CM

## 2023-03-30 PROCEDURE — 3077F SYST BP >= 140 MM HG: CPT | Performed by: OTOLARYNGOLOGY

## 2023-03-30 PROCEDURE — G8484 FLU IMMUNIZE NO ADMIN: HCPCS | Performed by: OTOLARYNGOLOGY

## 2023-03-30 PROCEDURE — 1123F ACP DISCUSS/DSCN MKR DOCD: CPT | Performed by: OTOLARYNGOLOGY

## 2023-03-30 PROCEDURE — 1036F TOBACCO NON-USER: CPT | Performed by: OTOLARYNGOLOGY

## 2023-03-30 PROCEDURE — 31231 NASAL ENDOSCOPY DX: CPT | Performed by: OTOLARYNGOLOGY

## 2023-03-30 PROCEDURE — 99214 OFFICE O/P EST MOD 30 MIN: CPT | Performed by: OTOLARYNGOLOGY

## 2023-03-30 PROCEDURE — G8417 CALC BMI ABV UP PARAM F/U: HCPCS | Performed by: OTOLARYNGOLOGY

## 2023-03-30 PROCEDURE — 3078F DIAST BP <80 MM HG: CPT | Performed by: OTOLARYNGOLOGY

## 2023-03-30 PROCEDURE — G8428 CUR MEDS NOT DOCUMENT: HCPCS | Performed by: OTOLARYNGOLOGY

## 2023-03-30 NOTE — PROGRESS NOTES
[address] [facility]  Follow up      Patient Name: [address] [place] [address] Record Number:  [id number]  Primary Care Physician:  Rajan Marinelli MD  Date of Consultation: 3/30/2023    Chief Complaint: Nasal congestion        Interval History    The patient said he is presenting for nasal congestion. This has been a problem for a while. The left is worse than the right. He has been on Flonase for a while and does not think it is helped significantly. He does not have any purulent drainage or other signs of infection. He denies any known history of nasal trauma or nasal surgery. I have seen him in the past to clean his ears. REVIEW OF SYSTEMS  As above    PHYSICAL EXAM  GENERAL: No Acute Distress, Alert and Oriented, no Hoarseness, strong voice  EYES: EOMI, Anti-icteric  HENT:   Head: Normocephalic and atraumatic. Face:  Symmetric, facial nerve intact, no sinus tenderness  Right Ear: Normal external ear, normal external auditory canal, intact tympanic membrane with normal mobility and aerated middle ear  Left Ear: Normal external ear, normal external auditory canal, intact tympanic membrane with normal mobility and aerated middle ear  Mouth/Oral Cavity:  normal lips, Uvula is midline, no mucosal lesions,  Oropharynx/Larynx:  normal oropharynx,   Nose: See below  NECK: Normal range of motion, no thyromegaly, trachea is midline, no lymphadenopathy, no neck masses, no crepitus          PROCEDURE  Nasal exam with nasal endoscopy  The patient has a normal bony nasal dorsum. He has bilateral valve collapse worse on the left than right with significant improvement with a modified Vermillion maneuver. he appears to have a very narrow internal nasal valve as well. His lower lateral cartilages are fairly weak. Afrin and lidocaine were applied the bilateral nasal cavity. A rigid scope used to visualize the left nasal cavity. He has a large inferior turbinate.   No polyps

## 2023-04-03 NOTE — PROGRESS NOTES
intracranial abnormality. Carotid Duplex 10/15/2015  The right internal carotid artery appears to have a 1-15% diameter reducing   stenosis based on velocity criteria. The right vertebral artery demonstrates normal antegrade flow. The left internal carotid artery appears to have a 1-15% diameter reducing   stenosis based on velocity criteria. The left vertebral artery demonstrates normal antegrade flow. Echo 10/15/15  Left ventricle size is normal. Normal left ventricular wall thickness. Ejection fraction is visually estimated to be 55-60 %. No regional wall motion abnormalities are noted. Normal diastolic function. The aortic valve is thickened/calcified with decreased leaflet mobility. The aortic valve area is calculated at 1.4 cm2 with a maximum pressure  gradient of 28 mmHg and a mean pressure gradient of 10 mmHg. This is c/w mild aortic stenosis. Mild aortic regurgitation is present. There is mild tricuspid regurgitation with RVSP estimated at 34 mmHg. The right atrium is mildly dilated. Echo 10/28/16  LVH with normal systolic function. EF  70%. The left atrium is normal in size. Trace to mild mitral regurgitation is present. Mild aortic regurgitation is present. Normal right ventricular size and function. Carotid duplex 7/12/19  The right internal carotid artery appears to have a <50% diameter reducing    stenosis based on velocity criteria. The right vertebral artery demonstrates normal antegrade flow. Previous left carotid endarterectomy. The left internal carotid artery demonstrates no significant stenosis. The left vertebral artery demonstrates normal antegrade flow. Assessment:  1. Chronic diastolic CHF, class 2  2. Atrial fibrillation, chronic   3. PAD  4. CAD of native coronary arteries without angina-CABG 2012  5. Hyperlipidemia with goal LDL<70 mg/dL  6. EDSON with CPAP therapy  7. Anemia secondary to GI bleeding  8.  Dyspnea     Plan:   Given concern for

## 2023-04-04 ENCOUNTER — TELEPHONE (OUTPATIENT)
Dept: CARDIOLOGY CLINIC | Age: 83
End: 2023-04-04

## 2023-04-04 ENCOUNTER — OFFICE VISIT (OUTPATIENT)
Dept: CARDIOLOGY CLINIC | Age: 83
End: 2023-04-04
Payer: MEDICARE

## 2023-04-04 VITALS
SYSTOLIC BLOOD PRESSURE: 136 MMHG | TEMPERATURE: 97 F | DIASTOLIC BLOOD PRESSURE: 78 MMHG | WEIGHT: 200 LBS | RESPIRATION RATE: 18 BRPM | OXYGEN SATURATION: 98 % | HEIGHT: 70 IN | BODY MASS INDEX: 28.63 KG/M2 | HEART RATE: 120 BPM

## 2023-04-04 DIAGNOSIS — I48.20 CHRONIC ATRIAL FIBRILLATION (HCC): ICD-10-CM

## 2023-04-04 DIAGNOSIS — E78.5 HYPERLIPIDEMIA LDL GOAL <70: ICD-10-CM

## 2023-04-04 DIAGNOSIS — G47.33 OSA (OBSTRUCTIVE SLEEP APNEA): ICD-10-CM

## 2023-04-04 DIAGNOSIS — I50.32 CHRONIC DIASTOLIC CHF (CONGESTIVE HEART FAILURE), NYHA CLASS 2 (HCC): Primary | ICD-10-CM

## 2023-04-04 DIAGNOSIS — I73.9 PAD (PERIPHERAL ARTERY DISEASE) (HCC): ICD-10-CM

## 2023-04-04 DIAGNOSIS — D64.9 ANEMIA, UNSPECIFIED TYPE: ICD-10-CM

## 2023-04-04 DIAGNOSIS — I25.10 CORONARY ARTERY DISEASE INVOLVING NATIVE CORONARY ARTERY OF NATIVE HEART WITHOUT ANGINA PECTORIS: ICD-10-CM

## 2023-04-04 DIAGNOSIS — R06.02 SHORTNESS OF BREATH: ICD-10-CM

## 2023-04-04 PROCEDURE — 1123F ACP DISCUSS/DSCN MKR DOCD: CPT | Performed by: INTERNAL MEDICINE

## 2023-04-04 PROCEDURE — G8417 CALC BMI ABV UP PARAM F/U: HCPCS | Performed by: INTERNAL MEDICINE

## 2023-04-04 PROCEDURE — 99214 OFFICE O/P EST MOD 30 MIN: CPT | Performed by: INTERNAL MEDICINE

## 2023-04-04 PROCEDURE — 1036F TOBACCO NON-USER: CPT | Performed by: INTERNAL MEDICINE

## 2023-04-04 PROCEDURE — 3078F DIAST BP <80 MM HG: CPT | Performed by: INTERNAL MEDICINE

## 2023-04-04 PROCEDURE — 3075F SYST BP GE 130 - 139MM HG: CPT | Performed by: INTERNAL MEDICINE

## 2023-04-04 PROCEDURE — G8427 DOCREV CUR MEDS BY ELIG CLIN: HCPCS | Performed by: INTERNAL MEDICINE

## 2023-04-04 PROCEDURE — 93000 ELECTROCARDIOGRAM COMPLETE: CPT | Performed by: INTERNAL MEDICINE

## 2023-04-04 NOTE — TELEPHONE ENCOUNTER
Called patient to ensure he knew he was only to increase torsemide to 40 mg in the morning and 20 mg in the evening for 2 days as I was unsure this was said in office. He v/u.

## 2023-04-06 PROBLEM — L29.9 ITCHING: Status: ACTIVE | Noted: 2023-04-06

## 2023-04-19 ENCOUNTER — TELEPHONE (OUTPATIENT)
Dept: CARDIOLOGY CLINIC | Age: 83
End: 2023-04-19

## 2023-04-19 NOTE — TELEPHONE ENCOUNTER
Reviewed with Dr. Claire Gamboa in office. Patient is cleared for surgery and may hold Xarelto for 3 days prior to procedure. Please notify and prepare letter if necessary. Thanks.

## 2023-04-19 NOTE — TELEPHONE ENCOUNTER
CARDIAC CLEARANCE     What type of procedure are you having? INFERIOR TURBINATE REDUCTION    Which physician is performing your procedure? Dr. Renee Ribeiro    When is your procedure scheduled for?  4/25/23    Where are you having this procedure? [facility]    Are you taking Blood Thinners? If so what? (Name/dose/frequesncy)  Ephraim    Does the surgeon want you to stop your blood thinner? If so for how long?   3 days prior    Phone Number and Contact Name for Physicians office:  Vianey Lakhani: [phone]    Fax number to send information:  [phone]

## 2023-04-21 ENCOUNTER — TELEPHONE (OUTPATIENT)
Dept: ENT CLINIC | Age: 83
End: 2023-04-21

## 2023-04-21 RX ORDER — FLUTICASONE PROPIONATE 50 MCG
2 SPRAY, SUSPENSION (ML) NASAL 2 TIMES DAILY
Qty: 1 EACH | Refills: 4 | Status: SHIPPED | OUTPATIENT
Start: 2023-04-21

## 2023-04-21 RX ORDER — FLUTICASONE PROPIONATE 50 MCG
2 SPRAY, SUSPENSION (ML) NASAL 2 TIMES DAILY
Qty: 16 G | Refills: 0 | Status: CANCELLED | OUTPATIENT
Start: 2023-04-21

## 2023-04-24 ENCOUNTER — ANESTHESIA EVENT (OUTPATIENT)
Dept: OPERATING ROOM | Age: 83
End: 2023-04-24
Payer: MEDICARE

## 2023-04-25 ENCOUNTER — HOSPITAL ENCOUNTER (OUTPATIENT)
Age: 83
Setting detail: OUTPATIENT SURGERY
Discharge: HOME | End: 2023-04-25
Attending: OTOLARYNGOLOGY | Admitting: OTOLARYNGOLOGY
Payer: MEDICARE

## 2023-04-25 ENCOUNTER — ANESTHESIA (OUTPATIENT)
Dept: OPERATING ROOM | Age: 83
End: 2023-04-25
Payer: MEDICARE

## 2023-04-25 VITALS
SYSTOLIC BLOOD PRESSURE: 164 MMHG | HEART RATE: 70 BPM | TEMPERATURE: 97.5 F | RESPIRATION RATE: 16 BRPM | DIASTOLIC BLOOD PRESSURE: 70 MMHG | HEIGHT: 70 IN | BODY MASS INDEX: 29.2 KG/M2 | WEIGHT: 204 LBS | OXYGEN SATURATION: 100 %

## 2023-04-25 DIAGNOSIS — R09.81 NASAL CONGESTION: Primary | ICD-10-CM

## 2023-04-25 LAB
ABO + RH BLD: NORMAL
ANION GAP SERPL CALCULATED.3IONS-SCNC: 14 MMOL/L (ref 3–16)
BLD GP AB SCN SERPL QL: NORMAL
BLOOD BANK DISPENSE STATUS: NORMAL
BLOOD BANK PRODUCT CODE: NORMAL
BPU ID: NORMAL
BUN SERPL-MCNC: 13 MG/DL (ref 7–20)
CALCIUM SERPL-MCNC: 9.3 MG/DL (ref 8.3–10.6)
CHLORIDE SERPL-SCNC: 100 MMOL/L (ref 99–110)
CO2 SERPL-SCNC: 23 MMOL/L (ref 21–32)
CREAT SERPL-MCNC: 1.5 MG/DL (ref 0.8–1.3)
DEPRECATED RDW RBC AUTO: 20.2 % (ref 12.4–15.4)
DESCRIPTION BLOOD BANK: NORMAL
GFR SERPLBLD CREATININE-BSD FMLA CKD-EPI: 45 ML/MIN/{1.73_M2}
GLUCOSE SERPL-MCNC: 107 MG/DL (ref 70–99)
HCT VFR BLD AUTO: 26 % (ref 40.5–52.5)
HCT VFR BLD AUTO: 27 % (ref 40.5–52.5)
HGB BLD-MCNC: 7.8 G/DL (ref 13.5–17.5)
HGB BLD-MCNC: 8.2 G/DL (ref 13.5–17.5)
MCH RBC QN AUTO: 19.7 PG (ref 26–34)
MCHC RBC AUTO-ENTMCNC: 30 G/DL (ref 31–36)
MCV RBC AUTO: 65.6 FL (ref 80–100)
PATH INTERP BLD-IMP: YES
PLATELET # BLD AUTO: 247 K/UL (ref 135–450)
PLATELET BLD QL SMEAR: ADEQUATE
PMV BLD AUTO: 8.6 FL (ref 5–10.5)
POTASSIUM SERPL-SCNC: 4 MMOL/L (ref 3.5–5.1)
RBC # BLD AUTO: 3.97 M/UL (ref 4.2–5.9)
SLIDE REVIEW: ABNORMAL
SODIUM SERPL-SCNC: 137 MMOL/L (ref 136–145)
WBC # BLD AUTO: 5 K/UL (ref 4–11)

## 2023-04-25 PROCEDURE — 3600000012 HC SURGERY LEVEL 2 ADDTL 15MIN: Performed by: OTOLARYNGOLOGY

## 2023-04-25 PROCEDURE — 86900 BLOOD TYPING SEROLOGIC ABO: CPT

## 2023-04-25 PROCEDURE — 30140 RESECT INFERIOR TURBINATE: CPT | Performed by: OTOLARYNGOLOGY

## 2023-04-25 PROCEDURE — 7100000000 HC PACU RECOVERY - FIRST 15 MIN: Performed by: OTOLARYNGOLOGY

## 2023-04-25 PROCEDURE — 7100000011 HC PHASE II RECOVERY - ADDTL 15 MIN: Performed by: OTOLARYNGOLOGY

## 2023-04-25 PROCEDURE — 2580000003 HC RX 258: Performed by: OTOLARYNGOLOGY

## 2023-04-25 PROCEDURE — 85014 HEMATOCRIT: CPT

## 2023-04-25 PROCEDURE — 2720000010 HC SURG SUPPLY STERILE: Performed by: OTOLARYNGOLOGY

## 2023-04-25 PROCEDURE — 80048 BASIC METABOLIC PNL TOTAL CA: CPT

## 2023-04-25 PROCEDURE — C1889 IMPLANT/INSERT DEVICE, NOC: HCPCS | Performed by: OTOLARYNGOLOGY

## 2023-04-25 PROCEDURE — P9016 RBC LEUKOCYTES REDUCED: HCPCS

## 2023-04-25 PROCEDURE — 6370000000 HC RX 637 (ALT 250 FOR IP): Performed by: OTOLARYNGOLOGY

## 2023-04-25 PROCEDURE — 85027 COMPLETE CBC AUTOMATED: CPT

## 2023-04-25 PROCEDURE — 86850 RBC ANTIBODY SCREEN: CPT

## 2023-04-25 PROCEDURE — 7100000010 HC PHASE II RECOVERY - FIRST 15 MIN: Performed by: OTOLARYNGOLOGY

## 2023-04-25 PROCEDURE — 3700000001 HC ADD 15 MINUTES (ANESTHESIA): Performed by: OTOLARYNGOLOGY

## 2023-04-25 PROCEDURE — 2580000003 HC RX 258: Performed by: ANESTHESIOLOGY

## 2023-04-25 PROCEDURE — 86901 BLOOD TYPING SEROLOGIC RH(D): CPT

## 2023-04-25 PROCEDURE — A4217 STERILE WATER/SALINE, 500 ML: HCPCS | Performed by: OTOLARYNGOLOGY

## 2023-04-25 PROCEDURE — 6360000002 HC RX W HCPCS: Performed by: NURSE ANESTHETIST, CERTIFIED REGISTERED

## 2023-04-25 PROCEDURE — 2500000003 HC RX 250 WO HCPCS

## 2023-04-25 PROCEDURE — 2500000003 HC RX 250 WO HCPCS: Performed by: NURSE ANESTHETIST, CERTIFIED REGISTERED

## 2023-04-25 PROCEDURE — 30468 RPR NSL VLV COLLAPSE W/IMPLT: CPT | Performed by: OTOLARYNGOLOGY

## 2023-04-25 PROCEDURE — 2500000003 HC RX 250 WO HCPCS: Performed by: OTOLARYNGOLOGY

## 2023-04-25 PROCEDURE — 3700000000 HC ANESTHESIA ATTENDED CARE: Performed by: OTOLARYNGOLOGY

## 2023-04-25 PROCEDURE — 3600000002 HC SURGERY LEVEL 2 BASE: Performed by: OTOLARYNGOLOGY

## 2023-04-25 PROCEDURE — 2709999900 HC NON-CHARGEABLE SUPPLY: Performed by: OTOLARYNGOLOGY

## 2023-04-25 PROCEDURE — 36415 COLL VENOUS BLD VENIPUNCTURE: CPT

## 2023-04-25 PROCEDURE — 7100000001 HC PACU RECOVERY - ADDTL 15 MIN: Performed by: OTOLARYNGOLOGY

## 2023-04-25 PROCEDURE — 86923 COMPATIBILITY TEST ELECTRIC: CPT

## 2023-04-25 PROCEDURE — 85018 HEMOGLOBIN: CPT

## 2023-04-25 DEVICE — IMPLANTABLE DEVICE: Type: IMPLANTABLE DEVICE | Site: NOSE | Status: FUNCTIONAL

## 2023-04-25 RX ORDER — SODIUM CHLORIDE 9 MG/ML
INJECTION, SOLUTION INTRAVENOUS PRN
Status: DISCONTINUED | OUTPATIENT
Start: 2023-04-25 | End: 2023-04-25 | Stop reason: HOSPADM

## 2023-04-25 RX ORDER — IPRATROPIUM BROMIDE AND ALBUTEROL SULFATE 2.5; .5 MG/3ML; MG/3ML
1 SOLUTION RESPIRATORY (INHALATION)
Status: DISCONTINUED | OUTPATIENT
Start: 2023-04-25 | End: 2023-04-25 | Stop reason: HOSPADM

## 2023-04-25 RX ORDER — SODIUM CHLORIDE 0.9 % (FLUSH) 0.9 %
5-40 SYRINGE (ML) INJECTION PRN
Status: DISCONTINUED | OUTPATIENT
Start: 2023-04-25 | End: 2023-04-25 | Stop reason: HOSPADM

## 2023-04-25 RX ORDER — ECHINACEA PURPUREA EXTRACT 125 MG
1 TABLET ORAL 4 TIMES DAILY
Qty: 1 EACH | Refills: 3 | Status: SHIPPED | OUTPATIENT
Start: 2023-04-25

## 2023-04-25 RX ORDER — DEXAMETHASONE SODIUM PHOSPHATE 4 MG/ML
INJECTION, SOLUTION INTRA-ARTICULAR; INTRALESIONAL; INTRAMUSCULAR; INTRAVENOUS; SOFT TISSUE PRN
Status: DISCONTINUED | OUTPATIENT
Start: 2023-04-25 | End: 2023-04-25 | Stop reason: SDUPTHER

## 2023-04-25 RX ORDER — HYDROCODONE BITARTRATE AND ACETAMINOPHEN 5; 325 MG/1; MG/1
1 TABLET ORAL
Status: DISCONTINUED | OUTPATIENT
Start: 2023-04-25 | End: 2023-04-25 | Stop reason: HOSPADM

## 2023-04-25 RX ORDER — FENTANYL CITRATE 50 UG/ML
INJECTION, SOLUTION INTRAMUSCULAR; INTRAVENOUS PRN
Status: DISCONTINUED | OUTPATIENT
Start: 2023-04-25 | End: 2023-04-25 | Stop reason: SDUPTHER

## 2023-04-25 RX ORDER — SODIUM CHLORIDE 0.9 % (FLUSH) 0.9 %
5-40 SYRINGE (ML) INJECTION EVERY 12 HOURS SCHEDULED
Status: DISCONTINUED | OUTPATIENT
Start: 2023-04-25 | End: 2023-04-25 | Stop reason: HOSPADM

## 2023-04-25 RX ORDER — MAGNESIUM HYDROXIDE 1200 MG/15ML
LIQUID ORAL CONTINUOUS PRN
Status: COMPLETED | OUTPATIENT
Start: 2023-04-25 | End: 2023-04-25

## 2023-04-25 RX ORDER — HYDRALAZINE HYDROCHLORIDE 20 MG/ML
5 INJECTION INTRAMUSCULAR; INTRAVENOUS
Status: DISCONTINUED | OUTPATIENT
Start: 2023-04-25 | End: 2023-04-25 | Stop reason: HOSPADM

## 2023-04-25 RX ORDER — PROPOFOL 10 MG/ML
INJECTION, EMULSION INTRAVENOUS PRN
Status: DISCONTINUED | OUTPATIENT
Start: 2023-04-25 | End: 2023-04-25 | Stop reason: SDUPTHER

## 2023-04-25 RX ORDER — FENTANYL CITRATE 50 UG/ML
25 INJECTION, SOLUTION INTRAMUSCULAR; INTRAVENOUS EVERY 5 MIN PRN
Status: DISCONTINUED | OUTPATIENT
Start: 2023-04-25 | End: 2023-04-25 | Stop reason: HOSPADM

## 2023-04-25 RX ORDER — PHENYLEPHRINE HCL IN 0.9% NACL 1 MG/10 ML
SYRINGE (ML) INTRAVENOUS PRN
Status: DISCONTINUED | OUTPATIENT
Start: 2023-04-25 | End: 2023-04-25 | Stop reason: SDUPTHER

## 2023-04-25 RX ORDER — LIDOCAINE HYDROCHLORIDE 20 MG/ML
INJECTION, SOLUTION EPIDURAL; INFILTRATION; INTRACAUDAL; PERINEURAL PRN
Status: DISCONTINUED | OUTPATIENT
Start: 2023-04-25 | End: 2023-04-25 | Stop reason: SDUPTHER

## 2023-04-25 RX ORDER — HYDROCODONE BITARTRATE AND ACETAMINOPHEN 5; 325 MG/1; MG/1
1 TABLET ORAL EVERY 6 HOURS PRN
Qty: 10 TABLET | Refills: 0 | Status: SHIPPED | OUTPATIENT
Start: 2023-04-25 | End: 2023-04-28

## 2023-04-25 RX ORDER — LIDOCAINE HYDROCHLORIDE AND EPINEPHRINE 10; 10 MG/ML; UG/ML
INJECTION, SOLUTION INFILTRATION; PERINEURAL
Status: COMPLETED | OUTPATIENT
Start: 2023-04-25 | End: 2023-04-25

## 2023-04-25 RX ORDER — OXYMETAZOLINE HYDROCHLORIDE 0.05 G/100ML
SPRAY NASAL
Status: COMPLETED | OUTPATIENT
Start: 2023-04-25 | End: 2023-04-25

## 2023-04-25 RX ORDER — ROCURONIUM BROMIDE 10 MG/ML
INJECTION, SOLUTION INTRAVENOUS PRN
Status: DISCONTINUED | OUTPATIENT
Start: 2023-04-25 | End: 2023-04-25 | Stop reason: SDUPTHER

## 2023-04-25 RX ORDER — LABETALOL HYDROCHLORIDE 5 MG/ML
5 INJECTION, SOLUTION INTRAVENOUS
Status: DISCONTINUED | OUTPATIENT
Start: 2023-04-25 | End: 2023-04-25 | Stop reason: HOSPADM

## 2023-04-25 RX ORDER — ONDANSETRON 2 MG/ML
INJECTION INTRAMUSCULAR; INTRAVENOUS PRN
Status: DISCONTINUED | OUTPATIENT
Start: 2023-04-25 | End: 2023-04-25 | Stop reason: SDUPTHER

## 2023-04-25 RX ADMIN — DEXAMETHASONE SODIUM PHOSPHATE 8 MG: 4 INJECTION, SOLUTION INTRAMUSCULAR; INTRAVENOUS at 13:15

## 2023-04-25 RX ADMIN — ONDANSETRON 4 MG: 2 INJECTION INTRAMUSCULAR; INTRAVENOUS at 13:15

## 2023-04-25 RX ADMIN — SUGAMMADEX 200 MG: 100 INJECTION, SOLUTION INTRAVENOUS at 13:28

## 2023-04-25 RX ADMIN — PROPOFOL 150 MG: 10 INJECTION, EMULSION INTRAVENOUS at 13:05

## 2023-04-25 RX ADMIN — LIDOCAINE HYDROCHLORIDE 80 MG: 20 INJECTION, SOLUTION EPIDURAL; INFILTRATION; INTRACAUDAL; PERINEURAL at 13:05

## 2023-04-25 RX ADMIN — SODIUM CHLORIDE: 9 INJECTION, SOLUTION INTRAVENOUS at 10:20

## 2023-04-25 RX ADMIN — FENTANYL CITRATE 100 MCG: 50 INJECTION INTRAMUSCULAR; INTRAVENOUS at 13:04

## 2023-04-25 RX ADMIN — ROCURONIUM BROMIDE 30 MG: 10 INJECTION INTRAVENOUS at 13:05

## 2023-04-25 RX ADMIN — Medication 100 MCG: at 13:16

## 2023-04-25 ASSESSMENT — PAIN DESCRIPTION - ORIENTATION
ORIENTATION: RIGHT
ORIENTATION: RIGHT;LEFT

## 2023-04-25 ASSESSMENT — PAIN DESCRIPTION - FREQUENCY
FREQUENCY: INTERMITTENT
FREQUENCY: CONTINUOUS

## 2023-04-25 ASSESSMENT — PAIN - FUNCTIONAL ASSESSMENT: PAIN_FUNCTIONAL_ASSESSMENT: 0-10

## 2023-04-25 ASSESSMENT — PAIN SCALES - GENERAL
PAINLEVEL_OUTOF10: 3
PAINLEVEL_OUTOF10: 0
PAINLEVEL_OUTOF10: 3
PAINLEVEL_OUTOF10: 0

## 2023-04-25 ASSESSMENT — PAIN DESCRIPTION - ONSET: ONSET: ON-GOING

## 2023-04-25 ASSESSMENT — PAIN DESCRIPTION - PAIN TYPE
TYPE: SURGICAL PAIN
TYPE: ACUTE PAIN

## 2023-04-25 ASSESSMENT — PAIN DESCRIPTION - DESCRIPTORS
DESCRIPTORS: ACHING
DESCRIPTORS: ACHING

## 2023-04-25 ASSESSMENT — LIFESTYLE VARIABLES: SMOKING_STATUS: 0

## 2023-04-25 ASSESSMENT — ENCOUNTER SYMPTOMS: SHORTNESS OF BREATH: 1

## 2023-04-25 ASSESSMENT — PAIN DESCRIPTION - LOCATION
LOCATION: NOSE
LOCATION: NOSE

## 2023-04-25 NOTE — DISCHARGE INSTRUCTIONS
Discharge Instructions    Steps to Take  Home Care   While your sinuses are healing:   Do not blow your nose for 5 days  You may have bloody discharge from your nose. Create a drip pad using rolls of gauze. Hold the roll under your nose to soak up any discharge. Avoid air pollutants like dust and smoke. Physical Activity   During your recovery:   Light activity for 3 days  Medications     Follow these general medication guidelines:   Take your medication as directed. Do not change the amount or schedule. Do not share your medication with anyone. Medications can be dangerous when mixed. Talk to your doctor if you are taking more than one medication, including over-the-counter products and supplements. If you had to stop medications before surgery, ask your doctor when you can start again. Follow-up  3 weeks  Call Your Doctor If Any of the Following Occur ([phone])  Contact your doctor if your recovery is not progressing as expected or you develop complications, such as:  Signs of infection, including fever and chills  Pain that you cannot control with the medications that you have been given  Redness, swelling, increasing pain, excessive bleeding, or discharge from the nose  Lightheadedness  Nausea and vomiting  Vomiting blood or material that looks like coffee grounds  Bruising around the eye(s)  Swelling of the eye(s)  Changes in vision  A headache that lasts longer than 2 days after surgery    If you think you have an emergency, call for medical help right away.

## 2023-04-25 NOTE — PROGRESS NOTES
Patient arrived to Phase 2 awake and alert. Patient's vital signs are stable. Patient tolerating food and drink well. Patient does not have any complaints of nausea.

## 2023-04-25 NOTE — H&P
I have reviewed this patient's history and physical.  There have been no significant changes. He understands the risk of an inferior turbinate reduction and Latera implant including epistaxis and ongoing nasal congestion. He has agreed to proceed.

## 2023-04-25 NOTE — ANESTHESIA PRE PROCEDURE
Department of Anesthesiology  Preprocedure Note       Name:  Heraclio Coleman   Age:  78 y.o.  :  1940                                          MRN:  [id number]         Date:  2023      Surgeon: Meredith Victoria):  Jennifer Faulkner MD    Procedure: Procedure(s):  INFERIOR TURBINATE REDUCTION  BILATERAL LATERA IMPLANT    Medications prior to admission:   Prior to Admission medications    Medication Sig Start Date End Date Taking?  Authorizing Provider   fluticasone (FLONASE) 50 MCG/ACT nasal spray 2 sprays by Each Nostril route 2 times daily 23   Rosalba De Leon MD   hydrOXYzine HCl (ATARAX) 25 MG tablet TAKE 1 TABLET BY MOUTH FOUR TIMES A DAY AS NEEDED FOR ITCHING 23   Rosalba De Leon MD   pantoprazole (PROTONIX) 40 MG tablet Take 1 tablet by mouth 2 times daily 3/21/23   Rosalba De Leon MD   fluticasone [facility]) 50 MCG/ACT nasal spray 2 sprays by Each Nostril route in the morning and at bedtime 3/21/23   Rosalba De Leon MD   levothyroxine (SYNTHROID) 25 MCG tablet TAKE ONE (1) TABLET BY MOUTH DAILY 23   Rosalba De Leon MD   mirtazapine (REMERON) 15 MG tablet TAKE ONE (1) TABLET BY MOUTH NIGHTLY 23   Rosalba De Leon MD   XARELTO 20 MG TABS tablet TAKE ONE (1) TABLET BY MOUTH DAILY WITH SUPPER 22   Oswaldo Ordoñez MD   isosorbide mononitrate (IMDUR) 30 MG extended release tablet TAKE ONE (1) TABLET BY MOUTH DAILY 22   Rosalba De Leon MD   rosuvastatin (CRESTOR) 40 MG tablet TAKE ONE (1) TABLET BY MOUTH DAILY 22   Oswaldo Ordoñez MD   dilTIAZem (CARDIZEM CD) 180 MG extended release capsule TAKE ONE (1) CAPSULE BY MOUTH DAILY  Patient not taking: Reported on 2023   Oswaldo Ordoñez MD   torsemide (DEMADEX) 20 MG tablet TAKE 2 TABLETS BY MOUTH DAILY 22   Oswaldo Ordoñez MD   levETIRAcetam (KEPPRA) 750 MG tablet TAKE ONE (1) TABLET BY MOUTH TWICE A DAY 22   Rosalba De Leon MD   lisinopril (PRINIVIL;ZESTRIL) 5 MG tablet TAKE

## 2023-04-25 NOTE — PROGRESS NOTES
To pacu from OR. Pt awake, denies pain. No nasal drainage noted. PRBC infusing. Monitor in a fib. IV infusing.

## 2023-04-25 NOTE — OP NOTE
[address] [facility]  OPERATIVE REPORT    Patient Name: Judi Cleaning  YOB: 1940  Medical Record Number:  [id number]  Billing Number:  [id number]  Date of Procedure: [unfilled]  Time: 1200    Pre Operative Diagnoses: nasal congestion  Inferior turbinate hypertrophy  Nasal valve collapse       Post Operative Diagnoses:    same             Procedure:  bilateral inferior turbinate reduction (73780-58)  Bilateral valve repair with Reyne Ebbing implant (80926)       Surgeon: Winston Garsia MD    OR Staff/ Assistant:  Circulator: Missael Viera RN  Scrub Person First: Glen Marin  Scrub Person Second: Jim Robins    Anesthesia:  General anesthesia. Findings:  1) bilateral inferior turbinate hypertrophy. Bilateral very narrow nasal valves    Indications: This is a 78 y.o. male with significant nasal congestion secondary to inferior turbinate hypertrophy as well as nasal valve collapse. He had tried all medications to help with this. Decision was made to perform an inferior turbinate reduction. I felt as though the valve collapse is playing a significant role, but given his medical comorbidities I did not think it was wise to do a full septorhinoplasty. We opted to do Latera implants instead. Risks and benefits discussed with the patient including alternate treatment options, Informed consent was obtained, the patient elected to proceed with the planned procedure. DETAILS OF PROCEDURE(S):   The patient was brought to the operating room and placed in a supine position the operating table. He underwent uncomplicated general anesthesia with endotracheal intubation. He was then prepped and draped. Exam showed narrow nasal valves with bilateral inferior turbinate hypertrophy. Attention was first turned to the turbinates. 1 mL of 1% lidocaine with epinephrine was injected into the bilateral inferior turbinates.   An incision was made into the anterior

## 2023-04-25 NOTE — ANESTHESIA POSTPROCEDURE EVALUATION
Department of Anesthesiology  Postprocedure Note    Patient: Shirley Willingham  MRN: [id number]  YOB: 1940  Date of evaluation: 4/25/2023      Procedure Summary     Date: 04/25/23 Room / Location: [address]    Anesthesia Start: 1257 Anesthesia Stop: 3095    Procedures:       INFERIOR TURBINATE REDUCTION (Bilateral: Nose)      BILATERAL LATERA IMPLANT (Bilateral: Nose) Diagnosis:       Nasal congestion      Nasal valve collapse      Hypertrophy of inferior nasal turbinate      (NASAL CONGESTION, NASAL VALVE COLLAPSE, INFERIOR TURBINATE HYPERTROPHY)    Surgeons: Ephraim Rendon MD Responsible Provider: Neli Cali MD    Anesthesia Type: general ASA Status: 3          Anesthesia Type: General    Yenny Phase I: Yenny Score: 10    Yenny Phase II: Yenny Score: 10      Anesthesia Post Evaluation    Patient location during evaluation: PACU  Patient participation: complete - patient participated  Level of consciousness: awake  Airway patency: patent  Nausea & Vomiting: no nausea and no vomiting  Complications: no  Cardiovascular status: hemodynamically stable and blood pressure returned to baseline  Respiratory status: spontaneous ventilation and nonlabored ventilation  Hydration status: stable  Comments: 1u pRBC started in preop completed intraoperatively. No concern for active bleed at end of procedure. Mr. Marcos Hardy was seen resting comfortably in PACU. Anticipate return to [facility] for planned discharge home with . Encouraged discussing iron supplementation with PCP, outpatient endoscopy likely warranted.

## 2023-04-27 ENCOUNTER — TELEPHONE (OUTPATIENT)
Dept: ENT CLINIC | Age: 83
End: 2023-04-27

## 2023-04-27 LAB — PATH INTERP BLD-IMP: NORMAL

## 2023-04-27 NOTE — TELEPHONE ENCOUNTER
It would take a while for the swelling to go away. He can restart Flonase if he would like.
Patient notified Alli Mart op scheduled
Pt had surg on Tues with Dr Sonido Romero and is having a lot of congestion. He is using neti-pot but it is not helping with the mucous and he feels very congested. He wants to know if he can use a nasal spray? When calling pt back, please schedule his post op the week of 5/15/23.
show

## 2023-05-01 ENCOUNTER — CARE COORDINATION (OUTPATIENT)
Dept: CARE COORDINATION | Age: 83
End: 2023-05-01

## 2023-05-01 DIAGNOSIS — J44.9 COPD WITH ASTHMA (HCC): ICD-10-CM

## 2023-05-01 DIAGNOSIS — I50.31 ACUTE DIASTOLIC CONGESTIVE HEART FAILURE (HCC): ICD-10-CM

## 2023-05-01 DIAGNOSIS — I10 ESSENTIAL HYPERTENSION: Primary | Chronic | ICD-10-CM

## 2023-05-01 NOTE — CARE COORDINATION
Remote Patient Kit Ordering Note      Date/Time:  5/1/2023 4:00 PM      [x] CCSS confirmed patient shipping address  [x] Patient will receive package over the next 2-4 business days. Someone 21 years or older must be present to sign for UPS delivery. [x] Patient to contact virtual installation-specific phone number listed in the patient instructions. [x] If the patient does not contact HRS within 24 hours, an [employer] [facility] [place] will call the patient directly: If the patient does not answer, HRS will follow up with the clinical team notifying them about the unsuccessful attempt to contact the patient. HRS will make three call attempts to the patient. [x] ACM will contact patient once equipment is active to welcome them to the program.                                                         [x] Hours of RPM monitoring - Monday-Friday [id number]                     All questions answered at this time. ACM made aware the RPM kit has been ordered. CCSS notified patient of RPM equipment order.

## 2023-05-01 NOTE — PROGRESS NOTES
5/1/23 3:13 PM        Remote Patient Monitoring Treatment Plan    Received request from ACM/CTN Perla Bhatti RN  to order remote patient monitoring for in home monitoring of CHF, COPD, and HTN and order completed. Patient will be monitoring blood pressure   pulse ox   weight  survey questions. Patient will engage in Remote Patient Monitoring each day to develop the skills necessary for self management.        RPM Care Team Responsibilities:   Alerts will be reviewed daily and addressed within 2-4 hours during operational hours (Monday -Friday 9 am-4 pm)  Alert response and intervention documented in patient medical record  Alert response escalated to PCP per protocol and documented in patient medical record  Patient monitored over approximately  days  Discharge from program based on self-management readiness    See care coordination encounters for additional details. '    Neetu Caceres DNP, FNP-C, Remote Patient Monitoring NP, () [phone]

## 2023-05-01 NOTE — CARE COORDINATION
IVAN called pt who was able to confirm that he was not in distress, and that his complaint of SOB was him struggling due to his nasal surgery. Pt states he does not feel SOB, but that he feels \"stuffy\" and that it is improving. IVAN explained RPM again to pt and he is in agreement.
there any symptoms or problems (risk indicators) you are unsure about that require further investigation?: Mild vague physical symptoms or problems; but do not impact on daily life or are not of concern to patient   Are the patients physical health problems impacting on their mental well-being?: No identified areas of concern   Are there any problems with your patients lifestyle behaviors (alcohol, drugs, diet, exercise) that are impacting on physical or mental well-being?: No identified areas of concern   Do you have any other concerns about your patients mental well-being?  How would you rate their severity and impact on the patient?: Mild problems - don't interfere with function   Suggested Interventions and Community Resources                  Future Appointments   Date Time Provider Sadie Deweyi   5/15/2023  9:00 AM Jez Caceres, [address] [facility]   6/6/2023 10:00 AM Lroi Perry MD [facility]   6/12/2023 10:40 AM Fransico Rai MD [facility]     ,   Congestive Heart Failure Assessment    Are you currently restricting fluids?: No Restriction  Do you understand a low sodium diet?: No  Do you understand how to read food labels?: Yes  How many restaurant meals do you eat per week?: 5 or more     No patient-reported symptoms      Symptoms:  None: Yes           ,   COPD Assessment    Does the patient understand envrionmental exposure?: Yes  Is the patient able to verbalize Rescue vs. Long Acting medications?: Yes  Does the patient have a nebulizer?: No  Does the patient use a space with inhaled medications?: No     No patient-reported symptoms         Symptoms:  None: Yes           , and   General Assessment    Do you have any symptoms that are causing concern?: Yes  Progression since Onset: Intermittent - Waxing/Waning  Reported Symptoms: Congestion

## 2023-05-08 ENCOUNTER — HOSPITAL ENCOUNTER (EMERGENCY)
Age: 83
Discharge: HOME | End: 2023-05-08
Payer: MEDICARE

## 2023-05-08 VITALS
HEART RATE: 65 BPM | OXYGEN SATURATION: 98 % | WEIGHT: 199.74 LBS | RESPIRATION RATE: 27 BRPM | SYSTOLIC BLOOD PRESSURE: 131 MMHG | DIASTOLIC BLOOD PRESSURE: 60 MMHG | BODY MASS INDEX: 28.66 KG/M2 | TEMPERATURE: 97.2 F

## 2023-05-08 DIAGNOSIS — K92.1 BLACK TARRY STOOLS: Primary | ICD-10-CM

## 2023-05-08 DIAGNOSIS — Z78.9 TAKES IRON SUPPLEMENTS: ICD-10-CM

## 2023-05-08 DIAGNOSIS — E87.6 HYPOKALEMIA: ICD-10-CM

## 2023-05-08 DIAGNOSIS — Z79.01 ANTICOAGULATED: ICD-10-CM

## 2023-05-08 LAB
ABO + RH BLD: NORMAL
ALBUMIN SERPL-MCNC: 4.2 G/DL (ref 3.4–5)
ALBUMIN/GLOB SERPL: 1.8 {RATIO} (ref 1.1–2.2)
ALP SERPL-CCNC: 93 U/L (ref 40–129)
ALT SERPL-CCNC: 11 U/L (ref 10–40)
ANION GAP SERPL CALCULATED.3IONS-SCNC: 11 MMOL/L (ref 3–16)
ANION GAP SERPL CALCULATED.3IONS-SCNC: 11 MMOL/L (ref 3–16)
APTT BLD: 28.1 SEC (ref 22.7–35.9)
AST SERPL-CCNC: 17 U/L (ref 15–37)
BILIRUB SERPL-MCNC: <0.2 MG/DL (ref 0–1)
BLD GP AB SCN SERPL QL: NORMAL
BUN SERPL-MCNC: 12 MG/DL (ref 7–20)
BUN SERPL-MCNC: 13 MG/DL (ref 7–20)
CALCIUM SERPL-MCNC: 9.2 MG/DL (ref 8.3–10.6)
CALCIUM SERPL-MCNC: 9.2 MG/DL (ref 8.3–10.6)
CHLORIDE SERPL-SCNC: 101 MMOL/L (ref 99–110)
CHLORIDE SERPL-SCNC: 102 MMOL/L (ref 99–110)
CO2 SERPL-SCNC: 26 MMOL/L (ref 21–32)
CO2 SERPL-SCNC: 27 MMOL/L (ref 21–32)
CREAT SERPL-MCNC: 1.1 MG/DL (ref 0.8–1.3)
CREAT SERPL-MCNC: 1.2 MG/DL (ref 0.8–1.3)
GFR SERPLBLD CREATININE-BSD FMLA CKD-EPI: 59 ML/MIN/{1.73_M2}
GFR SERPLBLD CREATININE-BSD FMLA CKD-EPI: >60 ML/MIN/{1.73_M2}
GLUCOSE SERPL-MCNC: 119 MG/DL (ref 70–99)
GLUCOSE SERPL-MCNC: 120 MG/DL (ref 70–99)
HEMOCCULT STL QL: NORMAL
INR PPP: 1.05 (ref 0.84–1.16)
MAGNESIUM SERPL-MCNC: 2.1 MG/DL (ref 1.8–2.4)
MAGNESIUM SERPL-MCNC: 2.1 MG/DL (ref 1.8–2.4)
NT-PROBNP SERPL-MCNC: 959 PG/ML (ref 0–449)
POTASSIUM SERPL-SCNC: 3.4 MMOL/L (ref 3.5–5.1)
POTASSIUM SERPL-SCNC: 3.5 MMOL/L (ref 3.5–5.1)
PROT SERPL-MCNC: 6.6 G/DL (ref 6.4–8.2)
PROTHROMBIN TIME: 13.7 SEC (ref 11.5–14.8)
SODIUM SERPL-SCNC: 138 MMOL/L (ref 136–145)
SODIUM SERPL-SCNC: 140 MMOL/L (ref 136–145)

## 2023-05-08 PROCEDURE — 85610 PROTHROMBIN TIME: CPT

## 2023-05-08 PROCEDURE — 86901 BLOOD TYPING SEROLOGIC RH(D): CPT

## 2023-05-08 PROCEDURE — 83735 ASSAY OF MAGNESIUM: CPT

## 2023-05-08 PROCEDURE — 6370000000 HC RX 637 (ALT 250 FOR IP): Performed by: GENERAL ACUTE CARE HOSPITAL

## 2023-05-08 PROCEDURE — 86900 BLOOD TYPING SEROLOGIC ABO: CPT

## 2023-05-08 PROCEDURE — 86850 RBC ANTIBODY SCREEN: CPT

## 2023-05-08 PROCEDURE — 85025 COMPLETE CBC W/AUTO DIFF WBC: CPT

## 2023-05-08 PROCEDURE — 85730 THROMBOPLASTIN TIME PARTIAL: CPT

## 2023-05-08 PROCEDURE — 82270 OCCULT BLOOD FECES: CPT

## 2023-05-08 PROCEDURE — 99283 EMERGENCY DEPT VISIT LOW MDM: CPT

## 2023-05-08 PROCEDURE — 83880 ASSAY OF NATRIURETIC PEPTIDE: CPT

## 2023-05-08 PROCEDURE — 80053 COMPREHEN METABOLIC PANEL: CPT

## 2023-05-08 RX ORDER — POTASSIUM CHLORIDE 20 MEQ/1
40 TABLET, EXTENDED RELEASE ORAL ONCE
Status: COMPLETED | OUTPATIENT
Start: 2023-05-08 | End: 2023-05-08

## 2023-05-08 RX ADMIN — POTASSIUM CHLORIDE 40 MEQ: 1500 TABLET, EXTENDED RELEASE ORAL at 15:28

## 2023-05-08 ASSESSMENT — PAIN - FUNCTIONAL ASSESSMENT: PAIN_FUNCTIONAL_ASSESSMENT: NONE - DENIES PAIN

## 2023-05-08 NOTE — DISCHARGE INSTRUCTIONS
Your black tarry stool has been caused by your iron supplement. You do not have any active rectal bleeding. Follow-up with your primary care physician as well as with your GI specialist.  Return for any worsening symptoms.

## 2023-05-08 NOTE — ED NOTES
Discharge and education instructions reviewed. Patient verbalized understanding, teach-back successful. Patient denied questions at this time. No acute distress noted. Patient instructed to follow-up as noted - return to emergency department if symptoms worsen. Patient verbalized understanding. Discharged per EDMD with discharged instructions.        mAbar Pulliam RN  05/08/23 [id number]

## 2023-05-09 ENCOUNTER — CARE COORDINATION (OUTPATIENT)
Dept: CARE COORDINATION | Age: 83
End: 2023-05-09

## 2023-05-09 LAB
BASOPHILS # BLD: 0 K/UL (ref 0–0.2)
BASOPHILS NFR BLD: 0.5 %
DEPRECATED RDW RBC AUTO: 23.9 % (ref 12.4–15.4)
EOSINOPHIL # BLD: 0.1 K/UL (ref 0–0.6)
EOSINOPHIL NFR BLD: 1.3 %
HCT VFR BLD AUTO: 31.2 % (ref 40.5–52.5)
HGB BLD-MCNC: 9.3 G/DL (ref 13.5–17.5)
LYMPHOCYTES # BLD: 1.2 K/UL (ref 1–5.1)
LYMPHOCYTES NFR BLD: 17.9 %
MCH RBC QN AUTO: 20.4 PG (ref 26–34)
MCHC RBC AUTO-ENTMCNC: 29.8 G/DL (ref 31–36)
MCV RBC AUTO: 68.3 FL (ref 80–100)
MONOCYTES # BLD: 0.6 K/UL (ref 0–1.3)
MONOCYTES NFR BLD: 8.9 %
NEUTROPHILS # BLD: 4.7 K/UL (ref 1.7–7.7)
NEUTROPHILS NFR BLD: 71.4 %
PATH INTERP BLD-IMP: NO
PLATELET # BLD AUTO: 299 K/UL (ref 135–450)
PMV BLD AUTO: 7.4 FL (ref 5–10.5)
RBC # BLD AUTO: 4.57 M/UL (ref 4.2–5.9)
WBC # BLD AUTO: 6.6 K/UL (ref 4–11)

## 2023-05-09 NOTE — CARE COORDINATION
ACM attempted to speak to pt about recent ED admit and f/u on RPM welcome call and equipment. Pt stated, \"I don't want to deal with all that\" and hung up. ACM will no longer follow pt and will inform RPM and MD of disenrollment due to lack of engagement.

## 2023-05-09 NOTE — CARE COORDINATION
CCSS placed call to patient to arrange RPM kit  through [facility]. Reviewed with patient how to pack equipment in original packing. Verified patient's availability to schedule UPS  time. UPS  time requested. Anticipated  date range 4-7 business days.

## 2023-05-10 DIAGNOSIS — I50.31 ACUTE DIASTOLIC CONGESTIVE HEART FAILURE (HCC): ICD-10-CM

## 2023-05-10 DIAGNOSIS — I10 ESSENTIAL HYPERTENSION: Primary | Chronic | ICD-10-CM

## 2023-05-10 NOTE — PROGRESS NOTES
Remote Patient Order Discontinued    Received request from Care Management Team to discontinue order for remote patient monitoring as patient declined program and order completed. Thank you,     Kerry Bishop M.D., M.P.H. [facility] Remote Patient Monitoring Provider   [address] [facility]   Phone: [phone]  Fax: [phone]       --Nathalie Downs MD on 5/10/2023 at 5:17 PM    An electronic signature was used to authenticate this note.

## 2023-05-12 ASSESSMENT — ENCOUNTER SYMPTOMS
ABDOMINAL PAIN: 0
SORE THROAT: 0
WHEEZING: 0
SHORTNESS OF BREATH: 0
VOICE CHANGE: 0
BLOOD IN STOOL: 1
COUGH: 0
BACK PAIN: 0
VOMITING: 0
CHEST TIGHTNESS: 0
NAUSEA: 1

## 2023-05-12 NOTE — ED PROVIDER NOTES
[address] [facility]        Pt Name: Merly West  MRN: [id number]  [place] 1940  Date of evaluation: 5/8/2023  Provider: SAVANNAH Chi CNP  PCP: Neymar Montanez MD  Note Started: 4:05 PM EDT 5/12/23      BRAD. I have evaluated this patient. My supervising physician was available for consultation. CHIEF COMPLAINT       Chief Complaint   Patient presents with    Abnormal Lab     Per pt, sent in for low H&H. The pt stated over the weekend the pt noticed black stool. HISTORY OF PRESENT ILLNESS: 1 or more Elements     History From: Patient      Merly West is a 78 y.o. male who presents to the emergency room today reporting a low H&H. Patient states that he has a history of anemia and states over the past week he has   Patient is noticed black stool. He is anticoagulated. Patient states that he has been prescribed an iron supplement which he has been taking as directed. He denies use of Pepto-Bismol. There is no history of any inflammatory bowel disease. He denies having any abdominal pain. He does report feeling fatigued. He denies headache. He denies dizziness, blurred vision, extremity numbness or tingling. He denies fever, chills or other symptoms. Nursing Notes were all reviewed and agreed with or any disagreements were addressed in the HPI. REVIEW OF SYSTEMS :      Review of Systems   Constitutional:  Positive for fatigue. Negative for chills and fever. HENT:  Negative for congestion, sore throat and voice change. Eyes:  Negative for visual disturbance. Respiratory:  Negative for cough, chest tightness, shortness of breath and wheezing. Cardiovascular:  Negative for chest pain and palpitations. Gastrointestinal:  Positive for blood in stool and nausea. Negative for abdominal pain and vomiting. Endocrine: Negative for polydipsia and polyuria.    Genitourinary:  Negative for difficulty

## 2023-05-15 ENCOUNTER — OFFICE VISIT (OUTPATIENT)
Dept: ENT CLINIC | Age: 83
End: 2023-05-15
Payer: MEDICARE

## 2023-05-15 VITALS — BODY MASS INDEX: 28.63 KG/M2 | HEIGHT: 70 IN | WEIGHT: 200 LBS

## 2023-05-15 DIAGNOSIS — L98.9 SKIN LESION: ICD-10-CM

## 2023-05-15 DIAGNOSIS — R09.81 NASAL CONGESTION: Primary | ICD-10-CM

## 2023-05-15 PROCEDURE — 99212 OFFICE O/P EST SF 10 MIN: CPT | Performed by: OTOLARYNGOLOGY

## 2023-05-15 NOTE — PROGRESS NOTES
Patient is following up from his turbinate reduction and bilateral Latera implants. We did this on April 25, 2023. He is notices significant improvement in his breathing. He is also sleeping better at night. Exam  Significant improvement in valve collapse. Anterior rhinoscopy shows well reduced turbinates. Plan  Patient is happy with the results. His nose looks significantly improved compared to preoperative. He can use Flonase and irrigations. Follow-up if he develops any other nasal symptoms.

## 2023-06-01 ENCOUNTER — HOSPITAL ENCOUNTER (OUTPATIENT)
Age: 83
Discharge: HOME | End: 2023-06-01
Payer: MEDICARE

## 2023-06-01 ENCOUNTER — HOSPITAL ENCOUNTER (OUTPATIENT)
Dept: GENERAL RADIOLOGY | Age: 83
Discharge: HOME | End: 2023-06-01
Payer: MEDICARE

## 2023-06-01 ENCOUNTER — HOSPITAL ENCOUNTER (OUTPATIENT)
Dept: CT IMAGING | Age: 83
Discharge: HOME | End: 2023-06-01
Payer: MEDICARE

## 2023-06-01 DIAGNOSIS — R10.9 FLANK PAIN: ICD-10-CM

## 2023-06-01 DIAGNOSIS — M54.6 ACUTE MIDLINE THORACIC BACK PAIN: ICD-10-CM

## 2023-06-01 DIAGNOSIS — R10.11 RUQ PAIN: ICD-10-CM

## 2023-06-01 PROCEDURE — 74176 CT ABD & PELVIS W/O CONTRAST: CPT

## 2023-06-01 PROCEDURE — 72070 X-RAY EXAM THORAC SPINE 2VWS: CPT

## 2023-06-02 RX ORDER — TORSEMIDE 20 MG/1
TABLET ORAL
Qty: 180 TABLET | Refills: 3 | Status: SHIPPED | OUTPATIENT
Start: 2023-06-02

## 2023-06-02 NOTE — TELEPHONE ENCOUNTER
Last OV: 4/4/23  Next OV: 6/6/23  Last refill: 8/9/22  #180  2 R/F  Most recent Labs: 6/1/23  Last EKG (if needed): 4/4/23

## 2023-06-05 NOTE — PROGRESS NOTES
[address] [facility] - Cardiology      CC: \"I went to the ED\"     HPI: Marycarmen Akbar is a 78 y.o. patient with a past medical history significant for atrial fibrillation, seizure disorder colon cancer s/p colectomy 2005 and CAD with prior PCI and then CABG X 2 vessels in November of 2012. He has atrial fibrillation and underwent an ablation by my partner Dr. Emily Lovelace. Unfortunately he had a recurrence of this and was placed on amiodarone and cardioverted. He was readmited to [facility] on 10/28/16-11/1/16 with some volume overload and was in a-fib again. He was found in atrial fibrillation while in the office and underwent successful cardioversion on 11/25/20. He was back in atrial fibrillation in office and it was decided to practice rate control. He was admitted 3/4-3/7/23 in [place] for shortness of breath and his chest xray noted emphysema. He was noted to have melena at his PCP office visit 3/21/23 and referred to GI. His most recent hemoglobin was 8 on 3/30/23 and the plan is to repeat a colonoscopy with Dr. oCmfort Rios 4/17/23. He presented to the ED 5/8/23 for a low hemoglobin and black, tarry stools. His stool guaiac was negative and H&H stabilized and it was recommended he follow up with PCP. His most recent hemoglobin was 12 on 6/1/23. Today he presents for follow up and states that overall he is feeling okay. He reports worsening shortness of breath at night when he is trying to sleep. He states he cannot lie flat to sleep and does use a couple pillows. He reports dyspnea with exertion that remains unchanged. He denies any associated chest pains or pressure. He reports worsening lower extremity edema but denies any weight gain. He states he has been taking Torsemide 40 mg daily. He reports medication compliance and is tolerating. He denies any abnormal bleeding or bruising.  He denies exertional chest pain/pressure, dyspnea at rest, worsening GARIBAY, palpitations, lightheadedness,

## 2023-06-06 ENCOUNTER — OFFICE VISIT (OUTPATIENT)
Dept: CARDIOLOGY CLINIC | Age: 83
End: 2023-06-06
Payer: MEDICARE

## 2023-06-06 VITALS
WEIGHT: 199 LBS | OXYGEN SATURATION: 96 % | HEIGHT: 70 IN | SYSTOLIC BLOOD PRESSURE: 115 MMHG | DIASTOLIC BLOOD PRESSURE: 59 MMHG | HEART RATE: 92 BPM | BODY MASS INDEX: 28.49 KG/M2

## 2023-06-06 DIAGNOSIS — G47.33 OSA (OBSTRUCTIVE SLEEP APNEA): ICD-10-CM

## 2023-06-06 DIAGNOSIS — E78.5 HYPERLIPIDEMIA LDL GOAL <70: ICD-10-CM

## 2023-06-06 DIAGNOSIS — I25.10 CORONARY ARTERY DISEASE INVOLVING NATIVE CORONARY ARTERY OF NATIVE HEART WITHOUT ANGINA PECTORIS: ICD-10-CM

## 2023-06-06 DIAGNOSIS — R06.00 PND (PAROXYSMAL NOCTURNAL DYSPNEA): ICD-10-CM

## 2023-06-06 DIAGNOSIS — I48.20 CHRONIC ATRIAL FIBRILLATION (HCC): ICD-10-CM

## 2023-06-06 DIAGNOSIS — D64.9 ANEMIA, UNSPECIFIED TYPE: ICD-10-CM

## 2023-06-06 DIAGNOSIS — I50.32 CHRONIC DIASTOLIC CHF (CONGESTIVE HEART FAILURE), NYHA CLASS 2 (HCC): Primary | ICD-10-CM

## 2023-06-06 DIAGNOSIS — I73.9 PAD (PERIPHERAL ARTERY DISEASE) (HCC): ICD-10-CM

## 2023-06-06 DIAGNOSIS — R06.02 SHORTNESS OF BREATH: ICD-10-CM

## 2023-06-06 PROCEDURE — G8417 CALC BMI ABV UP PARAM F/U: HCPCS | Performed by: INTERNAL MEDICINE

## 2023-06-06 PROCEDURE — 99214 OFFICE O/P EST MOD 30 MIN: CPT | Performed by: INTERNAL MEDICINE

## 2023-06-06 PROCEDURE — G8427 DOCREV CUR MEDS BY ELIG CLIN: HCPCS | Performed by: INTERNAL MEDICINE

## 2023-06-06 PROCEDURE — 93000 ELECTROCARDIOGRAM COMPLETE: CPT | Performed by: INTERNAL MEDICINE

## 2023-06-06 PROCEDURE — 3074F SYST BP LT 130 MM HG: CPT | Performed by: INTERNAL MEDICINE

## 2023-06-06 PROCEDURE — 1123F ACP DISCUSS/DSCN MKR DOCD: CPT | Performed by: INTERNAL MEDICINE

## 2023-06-06 PROCEDURE — 1036F TOBACCO NON-USER: CPT | Performed by: INTERNAL MEDICINE

## 2023-06-06 PROCEDURE — 3078F DIAST BP <80 MM HG: CPT | Performed by: INTERNAL MEDICINE

## 2023-06-06 RX ORDER — METOLAZONE 5 MG/1
5 TABLET ORAL AS NEEDED
Qty: 30 TABLET | Refills: 0 | Status: SHIPPED | OUTPATIENT
Start: 2023-06-06

## 2023-06-15 DIAGNOSIS — R06.00 PND (PAROXYSMAL NOCTURNAL DYSPNEA): ICD-10-CM

## 2023-06-15 DIAGNOSIS — I50.32 CHRONIC DIASTOLIC CHF (CONGESTIVE HEART FAILURE), NYHA CLASS 2 (HCC): ICD-10-CM

## 2023-06-15 DIAGNOSIS — I25.10 CORONARY ARTERY DISEASE INVOLVING NATIVE CORONARY ARTERY OF NATIVE HEART WITHOUT ANGINA PECTORIS: ICD-10-CM

## 2023-06-15 LAB
ANION GAP SERPL CALCULATED.3IONS-SCNC: 17 MMOL/L (ref 3–16)
BUN SERPL-MCNC: 44 MG/DL (ref 7–20)
CALCIUM SERPL-MCNC: 9.7 MG/DL (ref 8.3–10.6)
CHLORIDE SERPL-SCNC: 87 MMOL/L (ref 99–110)
CO2 SERPL-SCNC: 29 MMOL/L (ref 21–32)
CREAT SERPL-MCNC: 2 MG/DL (ref 0.8–1.3)
GFR SERPLBLD CREATININE-BSD FMLA CKD-EPI: 32 ML/MIN/{1.73_M2}
GLUCOSE SERPL-MCNC: 204 MG/DL (ref 70–99)
NT-PROBNP SERPL-MCNC: 921 PG/ML (ref 0–449)
POTASSIUM SERPL-SCNC: 3 MMOL/L (ref 3.5–5.1)
SODIUM SERPL-SCNC: 133 MMOL/L (ref 136–145)

## 2023-06-22 DIAGNOSIS — I50.32 CHRONIC DIASTOLIC CHF (CONGESTIVE HEART FAILURE), NYHA CLASS 2 (HCC): Primary | ICD-10-CM

## 2023-06-26 DIAGNOSIS — I50.32 CHRONIC DIASTOLIC CHF (CONGESTIVE HEART FAILURE), NYHA CLASS 2 (HCC): ICD-10-CM

## 2023-06-26 LAB
ANION GAP SERPL CALCULATED.3IONS-SCNC: 15 MMOL/L (ref 3–16)
BUN SERPL-MCNC: 11 MG/DL (ref 7–20)
CALCIUM SERPL-MCNC: 9.2 MG/DL (ref 8.3–10.6)
CHLORIDE SERPL-SCNC: 100 MMOL/L (ref 99–110)
CO2 SERPL-SCNC: 26 MMOL/L (ref 21–32)
CREAT SERPL-MCNC: 1.1 MG/DL (ref 0.8–1.3)
DEPRECATED RDW RBC AUTO: 29.2 % (ref 12.4–15.4)
GFR SERPLBLD CREATININE-BSD FMLA CKD-EPI: >60 ML/MIN/{1.73_M2}
GLUCOSE SERPL-MCNC: 121 MG/DL (ref 70–99)
HCT VFR BLD AUTO: 38.9 % (ref 40.5–52.5)
HGB BLD-MCNC: 12.5 G/DL (ref 13.5–17.5)
MCH RBC QN AUTO: 27.5 PG (ref 26–34)
MCHC RBC AUTO-ENTMCNC: 32.1 G/DL (ref 31–36)
MCV RBC AUTO: 85.6 FL (ref 80–100)
NT-PROBNP SERPL-MCNC: 1504 PG/ML (ref 0–449)
PLATELET # BLD AUTO: 179 K/UL (ref 135–450)
PMV BLD AUTO: 9 FL (ref 5–10.5)
POTASSIUM SERPL-SCNC: 3.8 MMOL/L (ref 3.5–5.1)
RBC # BLD AUTO: 4.54 M/UL (ref 4.2–5.9)
SODIUM SERPL-SCNC: 141 MMOL/L (ref 136–145)
WBC # BLD AUTO: 5.9 K/UL (ref 4–11)

## 2023-07-27 ENCOUNTER — OFFICE VISIT (OUTPATIENT)
Dept: ORTHOPEDIC SURGERY | Age: 83
End: 2023-07-27

## 2023-07-27 VITALS — BODY MASS INDEX: 27.63 KG/M2 | HEIGHT: 70 IN | WEIGHT: 193 LBS | RESPIRATION RATE: 16 BRPM

## 2023-07-27 DIAGNOSIS — M70.61 TROCHANTERIC BURSITIS OF BOTH HIPS: ICD-10-CM

## 2023-07-27 DIAGNOSIS — M25.552 BILATERAL HIP PAIN: Primary | ICD-10-CM

## 2023-07-27 DIAGNOSIS — M25.551 BILATERAL HIP PAIN: Primary | ICD-10-CM

## 2023-07-27 DIAGNOSIS — M70.62 TROCHANTERIC BURSITIS OF BOTH HIPS: ICD-10-CM

## 2023-07-27 RX ORDER — BUPIVACAINE HYDROCHLORIDE 2.5 MG/ML
2 INJECTION, SOLUTION INFILTRATION; PERINEURAL ONCE
Status: COMPLETED | OUTPATIENT
Start: 2023-07-27 | End: 2023-07-27

## 2023-07-27 RX ORDER — TRIAMCINOLONE ACETONIDE 40 MG/ML
40 INJECTION, SUSPENSION INTRA-ARTICULAR; INTRAMUSCULAR ONCE
Status: COMPLETED | OUTPATIENT
Start: 2023-07-27 | End: 2023-07-27

## 2023-07-27 RX ADMIN — TRIAMCINOLONE ACETONIDE 40 MG: 40 INJECTION, SUSPENSION INTRA-ARTICULAR; INTRAMUSCULAR at 11:02

## 2023-07-27 RX ADMIN — BUPIVACAINE HYDROCHLORIDE 5 MG: 2.5 INJECTION, SOLUTION INFILTRATION; PERINEURAL at 11:01

## 2023-07-27 RX ADMIN — TRIAMCINOLONE ACETONIDE 40 MG: 40 INJECTION, SUSPENSION INTRA-ARTICULAR; INTRAMUSCULAR at 11:03

## 2023-08-09 RX ORDER — ROSUVASTATIN CALCIUM 40 MG/1
TABLET, COATED ORAL
Qty: 90 TABLET | Refills: 3 | Status: SHIPPED | OUTPATIENT
Start: 2023-08-09

## 2023-08-09 RX ORDER — METOLAZONE 5 MG/1
TABLET ORAL
Qty: 30 TABLET | Refills: 3 | Status: SHIPPED | OUTPATIENT
Start: 2023-08-09

## 2023-11-01 NOTE — TELEPHONE ENCOUNTER
Last OV: 6/6/23  Next OV: Requested to return in 8 weeks. 8/16/23 No show.   Last refill: 11/23/22  Most recent Labs:   Last EKG (if needed):

## 2023-11-02 RX ORDER — RIVAROXABAN 20 MG/1
TABLET, FILM COATED ORAL
Qty: 90 TABLET | Refills: 3 | Status: SHIPPED | OUTPATIENT
Start: 2023-11-02

## 2023-11-02 NOTE — TELEPHONE ENCOUNTER
Genevieve (Registration) scheduled patient to see Dr. Athena Cogan in [place] on Thursday, November 16, 2023 at 9:30 AM.  Patient verbalized and confirmed understanding.

## 2023-11-16 ENCOUNTER — OFFICE VISIT (OUTPATIENT)
Dept: CARDIOLOGY CLINIC | Age: 83
End: 2023-11-16

## 2023-11-16 VITALS
DIASTOLIC BLOOD PRESSURE: 64 MMHG | OXYGEN SATURATION: 96 % | BODY MASS INDEX: 28.37 KG/M2 | WEIGHT: 198.2 LBS | HEIGHT: 70 IN | SYSTOLIC BLOOD PRESSURE: 144 MMHG | HEART RATE: 81 BPM

## 2023-11-16 DIAGNOSIS — I25.10 CORONARY ARTERY DISEASE INVOLVING NATIVE CORONARY ARTERY OF NATIVE HEART WITHOUT ANGINA PECTORIS: ICD-10-CM

## 2023-11-16 DIAGNOSIS — I48.20 CHRONIC ATRIAL FIBRILLATION (HCC): ICD-10-CM

## 2023-11-16 DIAGNOSIS — G47.33 OSA (OBSTRUCTIVE SLEEP APNEA): ICD-10-CM

## 2023-11-16 DIAGNOSIS — I50.32 CHRONIC DIASTOLIC CHF (CONGESTIVE HEART FAILURE), NYHA CLASS 2 (HCC): Primary | ICD-10-CM

## 2023-11-16 DIAGNOSIS — I73.9 PAD (PERIPHERAL ARTERY DISEASE) (HCC): ICD-10-CM

## 2023-11-16 DIAGNOSIS — E78.5 HYPERLIPIDEMIA LDL GOAL <70: ICD-10-CM

## 2023-11-16 NOTE — PROGRESS NOTES
significant stenosis. The left vertebral artery demonstrates normal antegrade flow. Assessment:  1. Chronic diastolic CHF, class 2  2. Atrial fibrillation, chronic   3. PAD  4. CAD of native coronary arteries without angina-CABG 2012  5. Hyperlipidemia with goal LDL<70 mg/dL  6. EDSON with CPAP therapy  7. Anemia secondary to GI bleeding  8. Dyspnea/PND/cough   9. Head injury     Plan:   Given his cough and PND, I advised him to take Torsemide 40 mg daily for the next 3 days and then back to 20 mg daily. I instructed him to call the office with an update in a week. His recent hemoglobin remains stable and there is no concern for GI bleed. We will continue to hold off on further pursuing Watchman at this time. He continues in rate controlled atrial fibrillation by EKG and will remain on Xarelto 20 mg daily for stroke risk reduction for now. He is scheduled for a head CT on 11/29/23 following his fall. He denies any symptoms representing angina and his blood pressure is well controlled. His most recent lipid profile was favorable on his current statin therapy. I have encouraged him to increase his aerobic activity as tolerated and adhere to a heart healthy diet. I have personally reviewed all previous testing for this visit today including imaging, lab results and EKG as detailed above. I will see him in the office for follow up in 6 months. This note was scribed in the presence of Dr Noel Conn MD by Sharla Pabon RN. Physician Attestation:  The scribes documentation has been prepared under my direction and personally reviewed by me in its entirety. I, Dr. Noel Conn personally performed the services described in this documentation as scribed by my RN in my presence, and I confirm that the note above accurately reflects all work, treatment, procedures, and medical decision making performed by me.

## 2024-01-05 PROBLEM — S61.419A LACERATION OF HAND WITHOUT FOREIGN BODY: Status: ACTIVE | Noted: 2024-01-05

## 2024-01-05 PROBLEM — J41.0 SIMPLE CHRONIC BRONCHITIS (HCC): Status: ACTIVE | Noted: 2024-01-05

## 2024-02-23 ENCOUNTER — OFFICE VISIT (OUTPATIENT)
Dept: ENT CLINIC | Age: 84
End: 2024-02-23
Payer: MEDICARE

## 2024-02-23 VITALS
WEIGHT: 206 LBS | HEART RATE: 86 BPM | RESPIRATION RATE: 16 BRPM | DIASTOLIC BLOOD PRESSURE: 60 MMHG | HEIGHT: 70 IN | TEMPERATURE: 97.9 F | SYSTOLIC BLOOD PRESSURE: 97 MMHG | BODY MASS INDEX: 29.49 KG/M2 | OXYGEN SATURATION: 96 %

## 2024-02-23 DIAGNOSIS — R09.81 NASAL CONGESTION: Primary | ICD-10-CM

## 2024-02-23 DIAGNOSIS — J34.89 NASAL VESTIBULITIS: ICD-10-CM

## 2024-02-23 PROCEDURE — 99213 OFFICE O/P EST LOW 20 MIN: CPT | Performed by: OTOLARYNGOLOGY

## 2024-02-23 PROCEDURE — G8427 DOCREV CUR MEDS BY ELIG CLIN: HCPCS | Performed by: OTOLARYNGOLOGY

## 2024-02-23 PROCEDURE — 1123F ACP DISCUSS/DSCN MKR DOCD: CPT | Performed by: OTOLARYNGOLOGY

## 2024-02-23 PROCEDURE — 1036F TOBACCO NON-USER: CPT | Performed by: OTOLARYNGOLOGY

## 2024-02-23 PROCEDURE — G8417 CALC BMI ABV UP PARAM F/U: HCPCS | Performed by: OTOLARYNGOLOGY

## 2024-02-23 PROCEDURE — G8484 FLU IMMUNIZE NO ADMIN: HCPCS | Performed by: OTOLARYNGOLOGY

## 2024-02-23 PROCEDURE — 3074F SYST BP LT 130 MM HG: CPT | Performed by: OTOLARYNGOLOGY

## 2024-02-23 PROCEDURE — 3078F DIAST BP <80 MM HG: CPT | Performed by: OTOLARYNGOLOGY

## 2024-02-23 NOTE — PROGRESS NOTES
[facility]  DIVISION OF OTOLARYNGOLOGY- HEAD & NECK SURGERY  Follow up      Patient Name: Saul Johnson  Medical Record Number:  [id number]  Primary Care Physician:  Cooper Ricardo MD  Date of Consultation: 2/23/2024    Chief Complaint: Nasal issues        Interval History    Patient following up for his nose.  In April I did a turbinate reduction and Latera implant.  He was initially happy.  He states been having issues with crusting and nasal congestion at night.  He does not have a humidifier in his bedroom.  He has not had any other symptoms.  It is a little better during the day.  He has a little bit of bloody discharge during this as well          REVIEW OF SYSTEMS  As above    PHYSICAL EXAM  GENERAL: No Acute Distress, Alert and Oriented, no Hoarseness, strong voice  EYES: EOMI, Anti-icteric  HENT:   Head: Normocephalic and atraumatic.   Face:  Symmetric, facial nerve intact, no sinus tenderness  Right Ear: Normal external ear  Left Ear: Normal external ear,  Mouth/Oral Cavity:  normal lips, Uvula is midline, no mucosal lesions, no trismus,  Oropharynx/Larynx:  normal oropharynx  Nose:Normal external nasal appearance.  Anterior rhinoscopy shows a bit of a rightward septal deviation.  He has bilateral crusting consistent with a nasal vestibulitis  NECK: Normal range of motion, no thyromegaly, trachea is midline, no lymphadenopathy, no neck masses, no crepitus            ASSESSMENT/PLAN  1. Nasal congestion  He appears to have a nasal vestibulitis.  I will give him Bactroban ointment to use for the next 10 days.  Have also discussed getting humidifier for his bedroom as sometimes this will help.  Follow-up if he feels as though is not getting better.    2. Nasal vestibulitis  As above             I have performed a head and neck physical exam personally or was physically present during the key or critical portions of the service.    This note was generated completely or in part utilizing Dragon dictation

## 2024-05-28 RX ORDER — TORSEMIDE 20 MG/1
20 TABLET ORAL DAILY
Qty: 90 TABLET | Refills: 3 | Status: SHIPPED | OUTPATIENT
Start: 2024-05-28

## 2024-09-05 RX ORDER — ROSUVASTATIN CALCIUM 40 MG/1
40 TABLET, COATED ORAL DAILY
Qty: 90 TABLET | Refills: 3 | Status: SHIPPED | OUTPATIENT
Start: 2024-09-05

## 2024-11-27 RX ORDER — RIVAROXABAN 20 MG/1
TABLET, FILM COATED ORAL
Qty: 90 TABLET | Refills: 3 | Status: SHIPPED | OUTPATIENT
Start: 2024-11-27

## 2025-06-25 ENCOUNTER — CARE COORDINATION (OUTPATIENT)
Dept: CARE COORDINATION | Age: 85
End: 2025-06-25

## 2025-06-25 NOTE — CARE COORDINATION
Ambulatory Care Coordination Note     6/25/2025 3:04 PM     ACM outreach attempt by this ACM today to offer care management services. ACM was unable to reach the patient by telephone today;   left voice message requesting a return phone call to this ACM.     ACM: Perla Hernandez RN     Care Summary Note: UTRx1    PCP/Specialist follow up:       Follow Up:   Plan for next ACM outreach in approximately 2 weeks to complete:  - outreach attempt to offer care management services  - RPM.

## 2025-07-01 ENCOUNTER — CARE COORDINATION (OUTPATIENT)
Dept: CARE COORDINATION | Age: 85
End: 2025-07-01

## 2025-07-01 NOTE — CARE COORDINATION
Ambulatory Care Coordination Note     7/1/2025 3:34 PM     patient outreach attempt by this ACM today to offer care management services. ACM was unable to reach the patient by telephone today;   left voice message requesting a return phone call to this ACM.     Patient closed (unable to reach patient) from the High Risk Care Management program on 7/1/2025.

## (undated) DEVICE — GLOVE ORANGE PI 7 1/2   MSG9075

## (undated) DEVICE — GOWN SIRUS NONREIN XL W/TWL: Brand: MEDLINE INDUSTRIES, INC.

## (undated) DEVICE — KIT,ANTI FOG,W/SPONGE & FLUID,SOFT PACK: Brand: MEDLINE

## (undated) DEVICE — TUBING, SUCTION, 1/4" X 12', STRAIGHT: Brand: MEDLINE

## (undated) DEVICE — INTENDED FOR TISSUE SEPARATION, AND OTHER PROCEDURES THAT REQUIRE A SHARP SURGICAL BLADE TO PUNCTURE OR CUT.: Brand: BARD-PARKER ® STAINLESS STEEL BLADES

## (undated) DEVICE — AGENT HEMSTAT W2XL3IN OXIDIZED REGENERATED CELOS ABSRB

## (undated) DEVICE — SUTURE ABSORBABLE MONOFILAMENT 5-0 P3 18 IN UD PDS + PDP493G

## (undated) DEVICE — SKIN MARKER REGULAR TIP WITH RULER CAP AND LABELS: Brand: DEVON

## (undated) DEVICE — ENT: Brand: MEDLINE INDUSTRIES, INC.

## (undated) DEVICE — SYRINGE MED 30ML STD CLR PLAS LUERLOCK TIP N CTRL DISP

## (undated) DEVICE — SPONGE,NEURO,1"X3",XR,STRL,LF,10/PK: Brand: MEDLINE

## (undated) DEVICE — SYRINGE MED 10ML LUERLOCK TIP W/O SFTY DISP

## (undated) DEVICE — CONTAINER,SPECIMEN,PNEU TUBE,3OZ,OR STRL: Brand: MEDLINE

## (undated) DEVICE — SYRINGE,EAR/ULCER, 2 OZ, STERILE: Brand: MEDLINE

## (undated) DEVICE — SOLUTION,SALINE,IRRGATION,500ML,STRL: Brand: MEDLINE

## (undated) DEVICE — STRIP,CLOSURE,WOUND,MEDI-STRIP,1/2X4: Brand: MEDLINE

## (undated) DEVICE — SUTURE PROL SZ 3-0 L18IN NONABSORBABLE BLU L30MM FS-1 3/8 8663G

## (undated) DEVICE — COVER LT HNDL BLU PLAS

## (undated) DEVICE — SUTURE CHROMIC GUT SZ 5-0 L18IN ABSRB BRN P-3 L13MM 3/8 CIR 687G

## (undated) DEVICE — SUTURE 4-0 L18IN ABSRB BRN PS-4 L16MM 1/2 CIR PRIM REV CUT 1643G

## (undated) DEVICE — SUTURE PROL SZ 6-0 L18IN NONABSORBABLE BLU L36MM P-1 3/8 8697G

## (undated) DEVICE — SPLINT NSL TRAPEZOIDAL IVRY

## (undated) DEVICE — TURBINATOR WAND: Brand: COBLATION

## (undated) DEVICE — 3M™ STERI-DRAPE™ INSTRUMENT POUCH 1018: Brand: STERI-DRAPE™

## (undated) DEVICE — NEEDLE HYPO 27GA L15IN REG BVL W O SFTY FOR SYR DISPOSABLE

## (undated) DEVICE — BLADE ES ELASTOMERIC COAT INSUL DURABLE BEND UPTO 90DEG

## (undated) DEVICE — MERCY HEALTH WEST TURNOVER: Brand: MEDLINE INDUSTRIES, INC.

## (undated) DEVICE — TOWEL,OR,DSP,ST,BLUE,STD,4/PK,20PK/CS: Brand: MEDLINE

## (undated) DEVICE — MASTISOL ADHESIVE LIQ 2/3ML

## (undated) DEVICE — SUTURE ABSORBABLE MONOFILAMENT 4-0 SC-1 18 IN PLN GUT 1824H